# Patient Record
Sex: FEMALE | Race: WHITE | NOT HISPANIC OR LATINO
[De-identification: names, ages, dates, MRNs, and addresses within clinical notes are randomized per-mention and may not be internally consistent; named-entity substitution may affect disease eponyms.]

---

## 2017-02-24 ENCOUNTER — APPOINTMENT (OUTPATIENT)
Dept: ORTHOPEDIC SURGERY | Facility: CLINIC | Age: 62
End: 2017-02-24

## 2017-02-24 VITALS
DIASTOLIC BLOOD PRESSURE: 73 MMHG | HEIGHT: 64 IN | HEART RATE: 74 BPM | WEIGHT: 212 LBS | SYSTOLIC BLOOD PRESSURE: 141 MMHG | BODY MASS INDEX: 36.19 KG/M2

## 2017-02-24 DIAGNOSIS — M17.11 UNILATERAL PRIMARY OSTEOARTHRITIS, RIGHT KNEE: ICD-10-CM

## 2017-03-07 ENCOUNTER — OUTPATIENT (OUTPATIENT)
Dept: OUTPATIENT SERVICES | Facility: HOSPITAL | Age: 62
LOS: 1 days | End: 2017-03-07
Payer: MEDICAID

## 2017-03-07 VITALS
WEIGHT: 216.05 LBS | TEMPERATURE: 98 F | HEIGHT: 62.25 IN | SYSTOLIC BLOOD PRESSURE: 122 MMHG | DIASTOLIC BLOOD PRESSURE: 80 MMHG | HEART RATE: 68 BPM | RESPIRATION RATE: 16 BRPM

## 2017-03-07 DIAGNOSIS — M17.12 UNILATERAL PRIMARY OSTEOARTHRITIS, LEFT KNEE: ICD-10-CM

## 2017-03-07 DIAGNOSIS — Z98.890 OTHER SPECIFIED POSTPROCEDURAL STATES: Chronic | ICD-10-CM

## 2017-03-07 DIAGNOSIS — N60.02 SOLITARY CYST OF LEFT BREAST: Chronic | ICD-10-CM

## 2017-03-07 LAB
APPEARANCE UR: CLEAR — SIGNIFICANT CHANGE UP
APTT BLD: 34 SEC — SIGNIFICANT CHANGE UP (ref 27.5–37.4)
BILIRUB UR-MCNC: NEGATIVE — SIGNIFICANT CHANGE UP
BLD GP AB SCN SERPL QL: NEGATIVE — SIGNIFICANT CHANGE UP
BLOOD UR QL VISUAL: NEGATIVE — SIGNIFICANT CHANGE UP
BUN SERPL-MCNC: 20 MG/DL — SIGNIFICANT CHANGE UP (ref 7–23)
CALCIUM SERPL-MCNC: 9.7 MG/DL — SIGNIFICANT CHANGE UP (ref 8.4–10.5)
CHLORIDE SERPL-SCNC: 105 MMOL/L — SIGNIFICANT CHANGE UP (ref 98–107)
CO2 SERPL-SCNC: 26 MMOL/L — SIGNIFICANT CHANGE UP (ref 22–31)
COLOR SPEC: YELLOW — SIGNIFICANT CHANGE UP
CREAT SERPL-MCNC: 0.69 MG/DL — SIGNIFICANT CHANGE UP (ref 0.5–1.3)
GLUCOSE SERPL-MCNC: 98 MG/DL — SIGNIFICANT CHANGE UP (ref 70–99)
GLUCOSE UR-MCNC: NEGATIVE — SIGNIFICANT CHANGE UP
HCT VFR BLD CALC: 42 % — SIGNIFICANT CHANGE UP (ref 34.5–45)
HGB BLD-MCNC: 13.6 G/DL — SIGNIFICANT CHANGE UP (ref 11.5–15.5)
INR BLD: 1 — SIGNIFICANT CHANGE UP (ref 0.87–1.18)
KETONES UR-MCNC: NEGATIVE — SIGNIFICANT CHANGE UP
LEUKOCYTE ESTERASE UR-ACNC: NEGATIVE — SIGNIFICANT CHANGE UP
MCHC RBC-ENTMCNC: 28 PG — SIGNIFICANT CHANGE UP (ref 27–34)
MCHC RBC-ENTMCNC: 32.4 % — SIGNIFICANT CHANGE UP (ref 32–36)
MCV RBC AUTO: 86.6 FL — SIGNIFICANT CHANGE UP (ref 80–100)
MUCOUS THREADS # UR AUTO: SIGNIFICANT CHANGE UP
NITRITE UR-MCNC: NEGATIVE — SIGNIFICANT CHANGE UP
PH UR: 6 — SIGNIFICANT CHANGE UP (ref 4.6–8)
PLATELET # BLD AUTO: 321 K/UL — SIGNIFICANT CHANGE UP (ref 150–400)
PMV BLD: 9.6 FL — SIGNIFICANT CHANGE UP (ref 7–13)
POTASSIUM SERPL-MCNC: 4.8 MMOL/L — SIGNIFICANT CHANGE UP (ref 3.5–5.3)
POTASSIUM SERPL-SCNC: 4.8 MMOL/L — SIGNIFICANT CHANGE UP (ref 3.5–5.3)
PROT UR-MCNC: NEGATIVE — SIGNIFICANT CHANGE UP
PROTHROM AB SERPL-ACNC: 11.4 SEC — SIGNIFICANT CHANGE UP (ref 10–13.1)
RBC # BLD: 4.85 M/UL — SIGNIFICANT CHANGE UP (ref 3.8–5.2)
RBC # FLD: 12.8 % — SIGNIFICANT CHANGE UP (ref 10.3–14.5)
RBC CASTS # UR COMP ASSIST: SIGNIFICANT CHANGE UP (ref 0–?)
RH IG SCN BLD-IMP: POSITIVE — SIGNIFICANT CHANGE UP
SODIUM SERPL-SCNC: 145 MMOL/L — SIGNIFICANT CHANGE UP (ref 135–145)
SP GR SPEC: 1.02 — SIGNIFICANT CHANGE UP (ref 1–1.03)
SQUAMOUS # UR AUTO: SIGNIFICANT CHANGE UP
UROBILINOGEN FLD QL: NORMAL E.U. — SIGNIFICANT CHANGE UP (ref 0.1–0.2)
WBC # BLD: 5.24 K/UL — SIGNIFICANT CHANGE UP (ref 3.8–10.5)
WBC # FLD AUTO: 5.24 K/UL — SIGNIFICANT CHANGE UP (ref 3.8–10.5)
WBC UR QL: SIGNIFICANT CHANGE UP (ref 0–?)

## 2017-03-07 PROCEDURE — 93010 ELECTROCARDIOGRAM REPORT: CPT

## 2017-03-07 RX ORDER — PANTOPRAZOLE SODIUM 20 MG/1
40 TABLET, DELAYED RELEASE ORAL ONCE
Qty: 0 | Refills: 0 | Status: COMPLETED | OUTPATIENT
Start: 2017-03-21 | End: 2017-03-21

## 2017-03-07 RX ORDER — SODIUM CHLORIDE 9 MG/ML
1000 INJECTION, SOLUTION INTRAVENOUS
Qty: 0 | Refills: 0 | Status: DISCONTINUED | OUTPATIENT
Start: 2017-03-21 | End: 2017-03-21

## 2017-03-07 RX ORDER — ACETAMINOPHEN 500 MG
975 TABLET ORAL ONCE
Qty: 0 | Refills: 0 | Status: COMPLETED | OUTPATIENT
Start: 2017-03-21 | End: 2017-03-21

## 2017-03-07 RX ORDER — SODIUM CHLORIDE 9 MG/ML
3 INJECTION INTRAMUSCULAR; INTRAVENOUS; SUBCUTANEOUS EVERY 8 HOURS
Qty: 0 | Refills: 0 | Status: DISCONTINUED | OUTPATIENT
Start: 2017-03-21 | End: 2017-03-23

## 2017-03-07 RX ORDER — GABAPENTIN 400 MG/1
300 CAPSULE ORAL ONCE
Qty: 0 | Refills: 0 | Status: COMPLETED | OUTPATIENT
Start: 2017-03-21 | End: 2017-03-21

## 2017-03-07 RX ORDER — TRAMADOL HYDROCHLORIDE 50 MG/1
50 TABLET ORAL ONCE
Qty: 0 | Refills: 0 | Status: DISCONTINUED | OUTPATIENT
Start: 2017-03-21 | End: 2017-03-21

## 2017-03-07 RX ORDER — MESALAMINE 400 MG
1 TABLET, DELAYED RELEASE (ENTERIC COATED) ORAL
Qty: 0 | Refills: 0 | COMMUNITY

## 2017-03-07 NOTE — H&P PST ADULT - NEGATIVE CARDIOVASCULAR SYMPTOMS
no dyspnea on exertion/no orthopnea/no palpitations/no chest pain/no claudication/no paroxysmal nocturnal dyspnea

## 2017-03-07 NOTE — H&P PST ADULT - NSANTHOSAYNRD_GEN_A_CORE
No. RENNY screening performed.  STOP BANG Legend: 0-2 = LOW Risk; 3-4 = INTERMEDIATE Risk; 5-8 = HIGH Risk

## 2017-03-07 NOTE — H&P PST ADULT - PSH
Breast cyst, left  excision  History of arthroscopy of both knees  (Right 2013, left 04/2016)  History of ovarian cystectomy

## 2017-03-07 NOTE — H&P PST ADULT - NEGATIVE NEUROLOGICAL SYMPTOMS
no syncope/no tremors/no loss of consciousness/no hemiparesis/no loss of sensation/no difficulty walking/no paresthesias/no focal seizures/no vertigo/no headache/no facial palsy/no generalized seizures/no weakness no tremors/no focal seizures/no facial palsy/no weakness/no paresthesias/no loss of consciousness/no syncope/no hemiparesis/no vertigo/no generalized seizures/no loss of sensation/no headache

## 2017-03-07 NOTE — H&P PST ADULT - NEGATIVE OPHTHALMOLOGIC SYMPTOMS
no lacrimation L/no loss of vision L/no pain R/no discharge L/no pain L/no scleral injection R/no blurred vision R/no discharge R/no diplopia/no irritation R/no blurred vision L/no irritation L/no photophobia/no loss of vision R/no scleral injection L/no lacrimation R

## 2017-03-07 NOTE — H&P PST ADULT - NEGATIVE GENERAL GENITOURINARY SYMPTOMS
normal urinary frequency/no flank pain L/no flank pain R/no hematuria/no bladder infections/no dysuria/no renal colic/no urinary hesitancy/no gas in urine/no urine discoloration no bladder infections/normal urinary frequency/no renal colic/no urine discoloration/no gas in urine/no flank pain R/no nocturia/no flank pain L/no urinary hesitancy/no dysuria/no hematuria

## 2017-03-07 NOTE — H&P PST ADULT - PROBLEM SELECTOR PLAN 1
Scheduled for left knee partial / total replacement on 03/21/17. Pre op instructions, famotidine, chlorhexidine gluconate soap given and explained. Pt verbalized understanding.  Pending medical clearance Scheduled for right knee partial / total replacement on 03/21/17. Pre op instructions, famotidine, chlorhexidine gluconate soap given and explained. Pt verbalized understanding.  Pending medical clearance

## 2017-03-07 NOTE — H&P PST ADULT - NEGATIVE ENMT SYMPTOMS
no throat pain/no dry mouth/no dysphagia/no nasal discharge/no sinus symptoms/no vertigo/no nasal obstruction/no post-nasal discharge/no nasal congestion/no hearing difficulty/no ear pain/no tinnitus

## 2017-03-07 NOTE — H&P PST ADULT - NEGATIVE SKIN SYMPTOMS
no itching/no pitted nails/no tumor/no hair loss/no brittle nails/no rash/no change in size/color of mole no itching/no pitted nails/no hair loss/no brittle nails/no rash/no dryness/no change in size/color of mole/no tumor

## 2017-03-07 NOTE — H&P PST ADULT - MUSCULOSKELETAL
detailed exam decreased ROM due to pain/joint swelling/b/l knees/diminished strength details… no joint warmth/no calf tenderness/decreased ROM due to pain/joint swelling/no joint erythema/b/l knees/diminished strength no joint erythema/no joint warmth/no calf tenderness/b/l knees, right>left/decreased ROM due to pain/joint swelling/diminished strength

## 2017-03-07 NOTE — H&P PST ADULT - NEGATIVE FEMALE-SPECIFIC SYMPTOMS
no menorrhagia/no abnormal vaginal bleeding/no spotting/no irregular menses no abnormal vaginal bleeding/no menorrhagia/no spotting/no pelvic pain/no vaginal discharge/no irregular menses

## 2017-03-07 NOTE — H&P PST ADULT - ASSESSMENT
60 y/o female presents with hx of unilateral primary osteoarthritis, left knee 60 y/o female presents with hx of unilateral primary osteoarthritis, right knee

## 2017-03-07 NOTE — H&P PST ADULT - NEUROLOGICAL DETAILS
sensation intact/deep reflexes intact/no spontaneous movement/responds to pain/responds to verbal commands/cranial nerves intact/alert and oriented x 3/superficial reflexes intact

## 2017-03-07 NOTE — H&P PST ADULT - PSY GEN HX ROS MEA POS PC
was treated with medication for a while, pt stated that she stopped 2 months ago on her own but Md was made aware/depression

## 2017-03-07 NOTE — H&P PST ADULT - MS GEN HX ROS MEA POS PC
elbows, shoulders, knees/back pain/joint swelling/joint pain/stiffness/arthritis muscle cramps/stiffness/back pain/arthritis/joint pain/joint swelling/elbows, shoulders, knees joint swelling/arthritis/joint pain/elbows, shoulders, knees right > left/muscle cramps/stiffness/back pain

## 2017-03-07 NOTE — H&P PST ADULT - RS GEN PE MLT RESP DETAILS PC
breath sounds equal/respirations non-labored/airway patent/no chest wall tenderness/no rhonchi/no intercostal retractions/no rales/clear to auscultation bilaterally/good air movement no wheezes/breath sounds equal/no intercostal retractions/no rales/no chest wall tenderness/no rhonchi/respirations non-labored/clear to auscultation bilaterally/good air movement/airway patent

## 2017-03-07 NOTE — H&P PST ADULT - NEGATIVE BREAST SYMPTOMS
no breast tenderness L/no breast tenderness R/no breast lump R/no nipple discharge L/no breast lump L no breast tenderness L/no breast lump R/no nipple discharge L/no breast lump L/no nipple discharge R/no breast tenderness R

## 2017-03-07 NOTE — H&P PST ADULT - HISTORY OF PRESENT ILLNESS
62 y/o  female with PMH: HTN, UC, GERD presents to PST for pre op evaluation with long term hx of bilateral knee pain, S/P b/l knees arthroscopy (2013, 2016). Pt stated that pain has been progressively wore 04/2016. Had physical therapy and multiple cortisone injections with some improvement. Now scheduled for left knee partail / toal knee replacement on 03/21/17. 62 y/o  female with PMH: HTN, UC, GERD, obesity presents to PST for pre op evaluation with long term hx of bilateral knee pain, S/P b/l knees arthroscopy (2013, 2016). Pt stated that pain has been progressively wore 04/2016. Had physical therapy and multiple cortisone injections with some improvement. Now scheduled for left knee partial / total knee replacement on 03/21/17. 60 y/o  female with PMH: HTN, UC, GERD, obesity presents to PST for pre op evaluation with long term hx of bilateral knee pain, S/P b/l knees arthroscopy (2013, 2016). Had physical therapy and multiple cortisone injections with some improvement. Pt stated that pain has been progressively worsening since 04/2016. Now scheduled for left knee partial / total knee replacement on 03/21/17. 62 y/o  female with PMH: HTN, UC, GERD, obesity presents to PST for pre op evaluation with long term hx of bilateral knee pain, S/P b/l knees arthroscopy (2013, 2016). Had physical therapy and multiple cortisone injections with some improvement. Pt stated that pain has been progressively worsening since 04/2016. Now scheduled for right knee partial / total knee replacement on 03/21/17.

## 2017-03-07 NOTE — H&P PST ADULT - NEGATIVE GENERAL SYMPTOMS
no fever/no chills/no fatigue no weight loss/no fever/no weight gain/no sweating/no anorexia/no polyphagia/no chills/no polyuria no fever/no chills/no weight gain/no polyphagia/no polydipsia/no sweating/no anorexia/no weight loss/no polyuria/no malaise

## 2017-03-07 NOTE — H&P PST ADULT - PMH
HTN (hypertension) GERD (gastroesophageal reflux disease)    HTN (hypertension)    Lumbar herniated disc    Obesity (BMI 30-39.9)    Ulcerative colitis GERD (gastroesophageal reflux disease)    HTN (hypertension)    Lumbar herniated disc    Obesity (BMI 30-39.9)    Ulcerative colitis    Unilateral primary osteoarthritis, left knee GERD (gastroesophageal reflux disease)    HTN (hypertension)    Lumbar herniated disc    Obesity (BMI 30-39.9)    Ulcerative colitis    Unilateral primary osteoarthritis, left knee    Unilateral primary osteoarthritis, right knee

## 2017-03-08 LAB
BACTERIA UR CULT: SIGNIFICANT CHANGE UP
SPECIMEN SOURCE: SIGNIFICANT CHANGE UP
SPECIMEN SOURCE: SIGNIFICANT CHANGE UP

## 2017-03-09 LAB — BACTERIA NPH CULT: SIGNIFICANT CHANGE UP

## 2017-03-10 ENCOUNTER — OTHER (OUTPATIENT)
Age: 62
End: 2017-03-10

## 2017-03-20 ENCOUNTER — RESULT REVIEW (OUTPATIENT)
Age: 62
End: 2017-03-20

## 2017-03-20 NOTE — ASU PATIENT PROFILE, ADULT - PMH
GERD (gastroesophageal reflux disease)    HTN (hypertension)    Lumbar herniated disc    Obesity (BMI 30-39.9)    Ulcerative colitis    Unilateral primary osteoarthritis, left knee    Unilateral primary osteoarthritis, right knee

## 2017-03-21 ENCOUNTER — INPATIENT (INPATIENT)
Facility: HOSPITAL | Age: 62
LOS: 1 days | Discharge: HOME HEALTH | End: 2017-03-23
Attending: ORTHOPAEDIC SURGERY | Admitting: ORTHOPAEDIC SURGERY
Payer: MEDICAID

## 2017-03-21 ENCOUNTER — APPOINTMENT (OUTPATIENT)
Dept: ORTHOPEDIC SURGERY | Facility: HOSPITAL | Age: 62
End: 2017-03-21

## 2017-03-21 VITALS
RESPIRATION RATE: 15 BRPM | WEIGHT: 216.05 LBS | OXYGEN SATURATION: 98 % | HEIGHT: 62 IN | SYSTOLIC BLOOD PRESSURE: 137 MMHG | DIASTOLIC BLOOD PRESSURE: 64 MMHG | TEMPERATURE: 98 F | HEART RATE: 78 BPM

## 2017-03-21 DIAGNOSIS — Z98.890 OTHER SPECIFIED POSTPROCEDURAL STATES: Chronic | ICD-10-CM

## 2017-03-21 DIAGNOSIS — M17.12 UNILATERAL PRIMARY OSTEOARTHRITIS, LEFT KNEE: ICD-10-CM

## 2017-03-21 DIAGNOSIS — N60.02 SOLITARY CYST OF LEFT BREAST: Chronic | ICD-10-CM

## 2017-03-21 LAB
BUN SERPL-MCNC: 15 MG/DL — SIGNIFICANT CHANGE UP (ref 7–23)
CALCIUM SERPL-MCNC: 8.8 MG/DL — SIGNIFICANT CHANGE UP (ref 8.4–10.5)
CHLORIDE SERPL-SCNC: 102 MMOL/L — SIGNIFICANT CHANGE UP (ref 98–107)
CO2 SERPL-SCNC: 25 MMOL/L — SIGNIFICANT CHANGE UP (ref 22–31)
CREAT SERPL-MCNC: 0.6 MG/DL — SIGNIFICANT CHANGE UP (ref 0.5–1.3)
GLUCOSE SERPL-MCNC: 134 MG/DL — HIGH (ref 70–99)
HCT VFR BLD CALC: 35.8 % — SIGNIFICANT CHANGE UP (ref 34.5–45)
HGB BLD-MCNC: 11.9 G/DL — SIGNIFICANT CHANGE UP (ref 11.5–15.5)
MCHC RBC-ENTMCNC: 28.4 PG — SIGNIFICANT CHANGE UP (ref 27–34)
MCHC RBC-ENTMCNC: 33.2 % — SIGNIFICANT CHANGE UP (ref 32–36)
MCV RBC AUTO: 85.4 FL — SIGNIFICANT CHANGE UP (ref 80–100)
PLATELET # BLD AUTO: 227 K/UL — SIGNIFICANT CHANGE UP (ref 150–400)
PMV BLD: 9.2 FL — SIGNIFICANT CHANGE UP (ref 7–13)
POTASSIUM SERPL-MCNC: 3.9 MMOL/L — SIGNIFICANT CHANGE UP (ref 3.5–5.3)
POTASSIUM SERPL-SCNC: 3.9 MMOL/L — SIGNIFICANT CHANGE UP (ref 3.5–5.3)
RBC # BLD: 4.19 M/UL — SIGNIFICANT CHANGE UP (ref 3.8–5.2)
RBC # FLD: 13 % — SIGNIFICANT CHANGE UP (ref 10.3–14.5)
RH IG SCN BLD-IMP: POSITIVE — SIGNIFICANT CHANGE UP
SODIUM SERPL-SCNC: 140 MMOL/L — SIGNIFICANT CHANGE UP (ref 135–145)
WBC # BLD: 5.44 K/UL — SIGNIFICANT CHANGE UP (ref 3.8–10.5)
WBC # FLD AUTO: 5.44 K/UL — SIGNIFICANT CHANGE UP (ref 3.8–10.5)

## 2017-03-21 PROCEDURE — 27447 TOTAL KNEE ARTHROPLASTY: CPT | Mod: RT

## 2017-03-21 PROCEDURE — 88311 DECALCIFY TISSUE: CPT | Mod: 26

## 2017-03-21 PROCEDURE — 73560 X-RAY EXAM OF KNEE 1 OR 2: CPT | Mod: 26,RT

## 2017-03-21 PROCEDURE — 88305 TISSUE EXAM BY PATHOLOGIST: CPT | Mod: 26

## 2017-03-21 RX ORDER — NALOXONE HYDROCHLORIDE 4 MG/.1ML
0.1 SPRAY NASAL
Qty: 0 | Refills: 0 | Status: DISCONTINUED | OUTPATIENT
Start: 2017-03-21 | End: 2017-03-23

## 2017-03-21 RX ORDER — OXYCODONE HYDROCHLORIDE 5 MG/1
10 TABLET ORAL EVERY 4 HOURS
Qty: 0 | Refills: 0 | Status: DISCONTINUED | OUTPATIENT
Start: 2017-03-21 | End: 2017-03-23

## 2017-03-21 RX ORDER — METOCLOPRAMIDE HCL 10 MG
10 TABLET ORAL ONCE
Qty: 0 | Refills: 0 | Status: COMPLETED | OUTPATIENT
Start: 2017-03-21 | End: 2017-03-21

## 2017-03-21 RX ORDER — MORPHINE SULFATE 50 MG/1
4 CAPSULE, EXTENDED RELEASE ORAL EVERY 4 HOURS
Qty: 0 | Refills: 0 | Status: DISCONTINUED | OUTPATIENT
Start: 2017-03-21 | End: 2017-03-23

## 2017-03-21 RX ORDER — SODIUM CHLORIDE 9 MG/ML
1000 INJECTION, SOLUTION INTRAVENOUS ONCE
Qty: 0 | Refills: 0 | Status: COMPLETED | OUTPATIENT
Start: 2017-03-21 | End: 2017-03-21

## 2017-03-21 RX ORDER — LOSARTAN POTASSIUM 100 MG/1
50 TABLET, FILM COATED ORAL DAILY
Qty: 0 | Refills: 0 | Status: DISCONTINUED | OUTPATIENT
Start: 2017-03-21 | End: 2017-03-23

## 2017-03-21 RX ORDER — SODIUM CHLORIDE 9 MG/ML
1000 INJECTION INTRAMUSCULAR; INTRAVENOUS; SUBCUTANEOUS ONCE
Qty: 0 | Refills: 0 | Status: COMPLETED | OUTPATIENT
Start: 2017-03-22 | End: 2017-03-22

## 2017-03-21 RX ORDER — MORPHINE SULFATE 50 MG/1
4 CAPSULE, EXTENDED RELEASE ORAL ONCE
Qty: 0 | Refills: 0 | Status: DISCONTINUED | OUTPATIENT
Start: 2017-03-21 | End: 2017-03-21

## 2017-03-21 RX ORDER — DEXAMETHASONE 0.5 MG/5ML
10 ELIXIR ORAL ONCE
Qty: 0 | Refills: 0 | Status: COMPLETED | OUTPATIENT
Start: 2017-03-22 | End: 2017-03-22

## 2017-03-21 RX ORDER — GABAPENTIN 400 MG/1
100 CAPSULE ORAL EVERY 8 HOURS
Qty: 0 | Refills: 0 | Status: DISCONTINUED | OUTPATIENT
Start: 2017-03-21 | End: 2017-03-23

## 2017-03-21 RX ORDER — ACETAMINOPHEN 500 MG
650 TABLET ORAL EVERY 6 HOURS
Qty: 0 | Refills: 0 | Status: DISCONTINUED | OUTPATIENT
Start: 2017-03-21 | End: 2017-03-23

## 2017-03-21 RX ORDER — KETOROLAC TROMETHAMINE 30 MG/ML
15 SYRINGE (ML) INJECTION EVERY 8 HOURS
Qty: 0 | Refills: 0 | Status: DISCONTINUED | OUTPATIENT
Start: 2017-03-21 | End: 2017-03-22

## 2017-03-21 RX ORDER — SODIUM CHLORIDE 9 MG/ML
1000 INJECTION INTRAMUSCULAR; INTRAVENOUS; SUBCUTANEOUS ONCE
Qty: 0 | Refills: 0 | Status: COMPLETED | OUTPATIENT
Start: 2017-03-21 | End: 2017-03-21

## 2017-03-21 RX ORDER — MORPHINE SULFATE 50 MG/1
0.1 CAPSULE, EXTENDED RELEASE ORAL ONCE
Qty: 0 | Refills: 0 | Status: DISCONTINUED | OUTPATIENT
Start: 2017-03-21 | End: 2017-03-21

## 2017-03-21 RX ORDER — OXYCODONE HYDROCHLORIDE 5 MG/1
5 TABLET ORAL EVERY 4 HOURS
Qty: 0 | Refills: 0 | Status: DISCONTINUED | OUTPATIENT
Start: 2017-03-21 | End: 2017-03-23

## 2017-03-21 RX ORDER — ONDANSETRON 8 MG/1
4 TABLET, FILM COATED ORAL EVERY 6 HOURS
Qty: 0 | Refills: 0 | Status: DISCONTINUED | OUTPATIENT
Start: 2017-03-21 | End: 2017-03-23

## 2017-03-21 RX ORDER — CEFAZOLIN SODIUM 1 G
2000 VIAL (EA) INJECTION EVERY 8 HOURS
Qty: 0 | Refills: 0 | Status: COMPLETED | OUTPATIENT
Start: 2017-03-21 | End: 2017-03-21

## 2017-03-21 RX ORDER — TRAMADOL HYDROCHLORIDE 50 MG/1
50 TABLET ORAL EVERY 8 HOURS
Qty: 0 | Refills: 0 | Status: DISCONTINUED | OUTPATIENT
Start: 2017-03-21 | End: 2017-03-23

## 2017-03-21 RX ORDER — POLYETHYLENE GLYCOL 3350 17 G/17G
17 POWDER, FOR SOLUTION ORAL DAILY
Qty: 0 | Refills: 0 | Status: DISCONTINUED | OUTPATIENT
Start: 2017-03-21 | End: 2017-03-23

## 2017-03-21 RX ORDER — SENNA PLUS 8.6 MG/1
2 TABLET ORAL AT BEDTIME
Qty: 0 | Refills: 0 | Status: DISCONTINUED | OUTPATIENT
Start: 2017-03-21 | End: 2017-03-23

## 2017-03-21 RX ORDER — DOCUSATE SODIUM 100 MG
100 CAPSULE ORAL THREE TIMES A DAY
Qty: 0 | Refills: 0 | Status: DISCONTINUED | OUTPATIENT
Start: 2017-03-21 | End: 2017-03-23

## 2017-03-21 RX ORDER — CELECOXIB 200 MG/1
200 CAPSULE ORAL
Qty: 0 | Refills: 0 | Status: DISCONTINUED | OUTPATIENT
Start: 2017-03-23 | End: 2017-03-23

## 2017-03-21 RX ORDER — ASPIRIN/CALCIUM CARB/MAGNESIUM 324 MG
325 TABLET ORAL
Qty: 0 | Refills: 0 | Status: DISCONTINUED | OUTPATIENT
Start: 2017-03-21 | End: 2017-03-23

## 2017-03-21 RX ORDER — SODIUM CHLORIDE 9 MG/ML
1000 INJECTION, SOLUTION INTRAVENOUS
Qty: 0 | Refills: 0 | Status: DISCONTINUED | OUTPATIENT
Start: 2017-03-21 | End: 2017-03-23

## 2017-03-21 RX ORDER — PANTOPRAZOLE SODIUM 20 MG/1
40 TABLET, DELAYED RELEASE ORAL DAILY
Qty: 0 | Refills: 0 | Status: DISCONTINUED | OUTPATIENT
Start: 2017-03-21 | End: 2017-03-23

## 2017-03-21 RX ADMIN — Medication 15 MILLIGRAM(S): at 23:04

## 2017-03-21 RX ADMIN — ONDANSETRON 4 MILLIGRAM(S): 8 TABLET, FILM COATED ORAL at 12:26

## 2017-03-21 RX ADMIN — GABAPENTIN 300 MILLIGRAM(S): 400 CAPSULE ORAL at 06:54

## 2017-03-21 RX ADMIN — SODIUM CHLORIDE 30 MILLILITER(S): 9 INJECTION, SOLUTION INTRAVENOUS at 06:57

## 2017-03-21 RX ADMIN — Medication 325 MILLIGRAM(S): at 23:04

## 2017-03-21 RX ADMIN — SODIUM CHLORIDE 150 MILLILITER(S): 9 INJECTION, SOLUTION INTRAVENOUS at 09:51

## 2017-03-21 RX ADMIN — Medication 10 MILLIGRAM(S): at 19:03

## 2017-03-21 RX ADMIN — SENNA PLUS 2 TABLET(S): 8.6 TABLET ORAL at 23:04

## 2017-03-21 RX ADMIN — Medication 100 MILLIGRAM(S): at 18:13

## 2017-03-21 RX ADMIN — SODIUM CHLORIDE 333.33 MILLILITER(S): 9 INJECTION, SOLUTION INTRAVENOUS at 23:30

## 2017-03-21 RX ADMIN — TRAMADOL HYDROCHLORIDE 50 MILLIGRAM(S): 50 TABLET ORAL at 23:04

## 2017-03-21 RX ADMIN — SODIUM CHLORIDE 150 MILLILITER(S): 9 INJECTION, SOLUTION INTRAVENOUS at 12:26

## 2017-03-21 RX ADMIN — TRAMADOL HYDROCHLORIDE 50 MILLIGRAM(S): 50 TABLET ORAL at 06:53

## 2017-03-21 RX ADMIN — Medication 975 MILLIGRAM(S): at 06:55

## 2017-03-21 RX ADMIN — PANTOPRAZOLE SODIUM 40 MILLIGRAM(S): 20 TABLET, DELAYED RELEASE ORAL at 06:54

## 2017-03-21 RX ADMIN — GABAPENTIN 100 MILLIGRAM(S): 400 CAPSULE ORAL at 23:04

## 2017-03-21 RX ADMIN — SODIUM CHLORIDE 1000 MILLILITER(S): 9 INJECTION INTRAMUSCULAR; INTRAVENOUS; SUBCUTANEOUS at 12:26

## 2017-03-21 RX ADMIN — Medication 15 MILLIGRAM(S): at 13:57

## 2017-03-21 RX ADMIN — Medication 100 MILLIGRAM(S): at 23:47

## 2017-03-21 RX ADMIN — Medication 650 MILLIGRAM(S): at 23:48

## 2017-03-21 RX ADMIN — ONDANSETRON 4 MILLIGRAM(S): 8 TABLET, FILM COATED ORAL at 18:32

## 2017-03-21 RX ADMIN — ONDANSETRON 4 MILLIGRAM(S): 8 TABLET, FILM COATED ORAL at 12:27

## 2017-03-21 NOTE — PHYSICAL THERAPY INITIAL EVALUATION ADULT - GENERAL OBSERVATIONS, REHAB EVAL
Pt encountered on stretcher, no distress, AxOx4, with +IV, +hemovac, Right Knee wrapped in ace bandage, and Right Knee immobilizer

## 2017-03-21 NOTE — PHYSICAL THERAPY INITIAL EVALUATION ADULT - ADDITIONAL COMMENTS
Pt lives in a private house with her 2 children with a flight of stairs to negotiate. Prior to hospital admission pt was completely independent and used a single axis cane with ambulation.

## 2017-03-21 NOTE — PHYSICAL THERAPY INITIAL EVALUATION ADULT - RANGE OF MOTION EXAMINATION, REHAB EVAL
Left LE ROM was WNL (within normal limits)/bilateral upper extremity ROM was WNL (within normal limits)

## 2017-03-21 NOTE — PHYSICAL THERAPY INITIAL EVALUATION ADULT - PERTINENT HX OF CURRENT PROBLEM, REHAB EVAL
62 y/o  female with PMH: HTN, UC, GERD, obesity presents to PST for pre op evaluation with long term hx of bilateral knee pain, S/P b/l knees arthroscopy (2013, 2016). Had physical therapy and multiple cortisone injections with some improvement. Pt stated that pain has been progressively worsening since 04/2016. Now scheduled for right knee partial / total knee replacement on 03/21/17

## 2017-03-21 NOTE — PHYSICAL THERAPY INITIAL EVALUATION ADULT - ACTIVE RANGE OF MOTION EXAMINATION, REHAB EVAL
LLE Active ROM was WNL (within normal limits)/Right Knee flexion 95 degrees/huber. upper extremity Active ROM was WNL (within normal limits)

## 2017-03-21 NOTE — PHYSICAL THERAPY INITIAL EVALUATION ADULT - CRITERIA FOR SKILLED THERAPEUTIC INTERVENTIONS
impairments found/risk reduction/prevention/rehab potential/functional limitations in following categories

## 2017-03-21 NOTE — PHYSICAL THERAPY INITIAL EVALUATION ADULT - PASSIVE RANGE OF MOTION EXAMINATION, REHAB EVAL
Right Knee flexion 108 degrees/bilateral upper extremity Passive ROM was WNL (within normal limits)/Left LE Passive ROM was WNL (within normal limits)

## 2017-03-22 ENCOUNTER — TRANSCRIPTION ENCOUNTER (OUTPATIENT)
Age: 62
End: 2017-03-22

## 2017-03-22 LAB
BUN SERPL-MCNC: 12 MG/DL — SIGNIFICANT CHANGE UP (ref 7–23)
CALCIUM SERPL-MCNC: 9.1 MG/DL — SIGNIFICANT CHANGE UP (ref 8.4–10.5)
CHLORIDE SERPL-SCNC: 102 MMOL/L — SIGNIFICANT CHANGE UP (ref 98–107)
CO2 SERPL-SCNC: 23 MMOL/L — SIGNIFICANT CHANGE UP (ref 22–31)
CREAT SERPL-MCNC: 0.5 MG/DL — SIGNIFICANT CHANGE UP (ref 0.5–1.3)
GLUCOSE SERPL-MCNC: 108 MG/DL — HIGH (ref 70–99)
HCT VFR BLD CALC: 35.7 % — SIGNIFICANT CHANGE UP (ref 34.5–45)
HGB BLD-MCNC: 11.8 G/DL — SIGNIFICANT CHANGE UP (ref 11.5–15.5)
MCHC RBC-ENTMCNC: 28.1 PG — SIGNIFICANT CHANGE UP (ref 27–34)
MCHC RBC-ENTMCNC: 33.1 % — SIGNIFICANT CHANGE UP (ref 32–36)
MCV RBC AUTO: 85 FL — SIGNIFICANT CHANGE UP (ref 80–100)
PLATELET # BLD AUTO: 255 K/UL — SIGNIFICANT CHANGE UP (ref 150–400)
PMV BLD: 9.2 FL — SIGNIFICANT CHANGE UP (ref 7–13)
POTASSIUM SERPL-MCNC: 4 MMOL/L — SIGNIFICANT CHANGE UP (ref 3.5–5.3)
POTASSIUM SERPL-SCNC: 4 MMOL/L — SIGNIFICANT CHANGE UP (ref 3.5–5.3)
RBC # BLD: 4.2 M/UL — SIGNIFICANT CHANGE UP (ref 3.8–5.2)
RBC # FLD: 13 % — SIGNIFICANT CHANGE UP (ref 10.3–14.5)
SODIUM SERPL-SCNC: 140 MMOL/L — SIGNIFICANT CHANGE UP (ref 135–145)
WBC # BLD: 8.85 K/UL — SIGNIFICANT CHANGE UP (ref 3.8–10.5)
WBC # FLD AUTO: 8.85 K/UL — SIGNIFICANT CHANGE UP (ref 3.8–10.5)

## 2017-03-22 RX ORDER — MESALAMINE 400 MG
1500 TABLET, DELAYED RELEASE (ENTERIC COATED) ORAL DAILY
Qty: 0 | Refills: 0 | Status: DISCONTINUED | OUTPATIENT
Start: 2017-03-22 | End: 2017-03-22

## 2017-03-22 RX ORDER — MESALAMINE 400 MG
3 TABLET, DELAYED RELEASE (ENTERIC COATED) ORAL
Qty: 0 | Refills: 0 | COMMUNITY

## 2017-03-22 RX ORDER — GABAPENTIN 400 MG/1
1 CAPSULE ORAL
Qty: 90 | Refills: 0
Start: 2017-03-22 | End: 2017-04-21

## 2017-03-22 RX ORDER — ASPIRIN/CALCIUM CARB/MAGNESIUM 324 MG
1 TABLET ORAL
Qty: 90 | Refills: 0
Start: 2017-03-22 | End: 2017-05-06

## 2017-03-22 RX ORDER — ACETAMINOPHEN 500 MG
2 TABLET ORAL
Qty: 0 | Refills: 0 | COMMUNITY

## 2017-03-22 RX ORDER — SENNA PLUS 8.6 MG/1
2 TABLET ORAL
Qty: 10 | Refills: 0
Start: 2017-03-22 | End: 2017-03-27

## 2017-03-22 RX ORDER — MESALAMINE 400 MG
500 TABLET, DELAYED RELEASE (ENTERIC COATED) ORAL THREE TIMES A DAY
Qty: 0 | Refills: 0 | Status: DISCONTINUED | OUTPATIENT
Start: 2017-03-22 | End: 2017-03-22

## 2017-03-22 RX ORDER — OXYCODONE AND ACETAMINOPHEN 5; 325 MG/1; MG/1
1 TABLET ORAL
Qty: 60 | Refills: 0
Start: 2017-03-22 | End: 2017-03-29

## 2017-03-22 RX ORDER — TRAMADOL HYDROCHLORIDE 50 MG/1
1 TABLET ORAL
Qty: 42 | Refills: 0
Start: 2017-03-22 | End: 2017-04-05

## 2017-03-22 RX ORDER — DOCUSATE SODIUM 100 MG
1 CAPSULE ORAL
Qty: 0 | Refills: 0 | DISCHARGE
Start: 2017-03-22

## 2017-03-22 RX ADMIN — TRAMADOL HYDROCHLORIDE 50 MILLIGRAM(S): 50 TABLET ORAL at 06:03

## 2017-03-22 RX ADMIN — SODIUM CHLORIDE 1000 MILLILITER(S): 9 INJECTION INTRAMUSCULAR; INTRAVENOUS; SUBCUTANEOUS at 06:45

## 2017-03-22 RX ADMIN — Medication 100 MILLIGRAM(S): at 06:03

## 2017-03-22 RX ADMIN — SODIUM CHLORIDE 3 MILLILITER(S): 9 INJECTION INTRAMUSCULAR; INTRAVENOUS; SUBCUTANEOUS at 13:37

## 2017-03-22 RX ADMIN — Medication 650 MILLIGRAM(S): at 17:45

## 2017-03-22 RX ADMIN — Medication 650 MILLIGRAM(S): at 12:14

## 2017-03-22 RX ADMIN — Medication 650 MILLIGRAM(S): at 06:03

## 2017-03-22 RX ADMIN — GABAPENTIN 100 MILLIGRAM(S): 400 CAPSULE ORAL at 14:15

## 2017-03-22 RX ADMIN — OXYCODONE HYDROCHLORIDE 5 MILLIGRAM(S): 5 TABLET ORAL at 21:30

## 2017-03-22 RX ADMIN — OXYCODONE HYDROCHLORIDE 5 MILLIGRAM(S): 5 TABLET ORAL at 22:30

## 2017-03-22 RX ADMIN — Medication 100 MILLIGRAM(S): at 21:29

## 2017-03-22 RX ADMIN — Medication 102 MILLIGRAM(S): at 07:15

## 2017-03-22 RX ADMIN — LOSARTAN POTASSIUM 50 MILLIGRAM(S): 100 TABLET, FILM COATED ORAL at 06:03

## 2017-03-22 RX ADMIN — Medication 15 MILLIGRAM(S): at 06:03

## 2017-03-22 RX ADMIN — GABAPENTIN 100 MILLIGRAM(S): 400 CAPSULE ORAL at 06:03

## 2017-03-22 RX ADMIN — SODIUM CHLORIDE 3 MILLILITER(S): 9 INJECTION INTRAMUSCULAR; INTRAVENOUS; SUBCUTANEOUS at 21:22

## 2017-03-22 RX ADMIN — Medication 325 MILLIGRAM(S): at 06:03

## 2017-03-22 RX ADMIN — Medication 325 MILLIGRAM(S): at 17:45

## 2017-03-22 RX ADMIN — SENNA PLUS 2 TABLET(S): 8.6 TABLET ORAL at 21:29

## 2017-03-22 RX ADMIN — GABAPENTIN 100 MILLIGRAM(S): 400 CAPSULE ORAL at 21:29

## 2017-03-22 RX ADMIN — TRAMADOL HYDROCHLORIDE 50 MILLIGRAM(S): 50 TABLET ORAL at 14:15

## 2017-03-22 RX ADMIN — PANTOPRAZOLE SODIUM 40 MILLIGRAM(S): 20 TABLET, DELAYED RELEASE ORAL at 12:14

## 2017-03-22 NOTE — DISCHARGE NOTE ADULT - HOME CARE AGENCY
[facility] - [phone]  Nurse to visit the day after hospital discharge; physical therapist to follow. Please contact the home care agency at the above phone number if you have not heard from them by 12 noon on the day after your hospital discharge.

## 2017-03-22 NOTE — OCCUPATIONAL THERAPY INITIAL EVALUATION ADULT - PERTINENT HX OF CURRENT PROBLEM, REHAB EVAL
Pt is a 60 yo female with PMHx of HTN, UC, GERD, & obesity with long term hx of bilateral knee pain, S/P b/l knees arthroscopy (2013, 2016). Pt had physical therapy and multiple cortisone injections with some improvement. Pt stated that pain has been progressively worsening since 04/2016. Pt is now s/p right knee total knee replacement on 03/21/17.

## 2017-03-22 NOTE — DISCHARGE NOTE ADULT - HOSPITAL COURSE
Patient is a 60 y/o female s/p right knee uni-arthroplasty on 3/22/17. Patient tolerated the procedure well without any intraoperative complications. Patient states pain is controlled. Tolerated Physical Therapy well. As per Dr Norman patient is stable and ready for discharge. Please follow up with Dr Norman in 1 week. Call office to make an appointment. Please follow up with your PMD for continuity of care and management. Please keep aquacel dressing in place until post op day # 14. Any sutures/staples to be removed on post op day # 14.  Please take aspirin twice daily for DVT PPX. Weight bearing as tolerated. Call orthopaedics with any questions. Patient is a 60 y/o female s/p right knee arthroplasty on 3/22/17. Patient tolerated the procedure well without any intraoperative complications. Patient states pain is controlled. Tolerated Physical Therapy well. As per Dr Norman patient is stable and ready for discharge. Please follow up with Dr Norman in 1 week. Call office to make an appointment. Please follow up with your PMD for continuity of care and management. Please keep aquacel dressing in place until post op day # 14. Any sutures/staples to be removed on post op day # 14.  Please take aspirin twice daily for DVT PPX. Weight bearing as tolerated. Call orthopaedics with any questions.

## 2017-03-22 NOTE — OCCUPATIONAL THERAPY INITIAL EVALUATION ADULT - MD ORDER
Occupational Therapy to evaluate and treat. Occupational Therapy to evaluate and treat. OOB to Chair. Ambulate with walker. Ambulate as Tolerated. Per HYACINTH Sierra, pt. is okay to participate in OT evaluation and perform OOB activity.

## 2017-03-22 NOTE — DISCHARGE NOTE ADULT - INSTRUCTIONS
may resume diet as prior to surgery You have a postop appointment with Dr Norman on 4/7/2017 @ 8:30 AM in the [place] office, please keep aquacel dressing in place until this appointment.  Notify Dr Norman if you experience any increase in pain not relieved with pain medication, any redness, drainage or swelling around incision or if you develop a fever>101.0.  Continue to do exercises as instructed, drink plenty of fluids.  Use over the counter stool softeners to assist with constipation related to pain medication.

## 2017-03-22 NOTE — DISCHARGE NOTE ADULT - MEDICATION SUMMARY - MEDICATIONS TO STOP TAKING
I will STOP taking the medications listed below when I get home from the hospital:    Tylenol 500 mg oral tablet  -- 2 tab(s) by mouth every 6 hours, As Needed

## 2017-03-22 NOTE — DISCHARGE NOTE ADULT - CARE PROVIDERS DIRECT ADDRESSES
[email or web address]

## 2017-03-22 NOTE — OCCUPATIONAL THERAPY INITIAL EVALUATION ADULT - PRECAUTIONS/LIMITATIONS, REHAB EVAL
fall precautions/surgical precautions/R Knee Immobilizer- Only when ambulating, may d/c when patient able to actively straight leg raise

## 2017-03-22 NOTE — DISCHARGE NOTE ADULT - CARE PROVIDER_API CALL
Kehinde Norman), Orthopaedic Surgery  [address] [place] [address]  Phone: [phone]  Fax: [phone]

## 2017-03-22 NOTE — OCCUPATIONAL THERAPY INITIAL EVALUATION ADULT - LEVEL OF INDEPENDENCE: SIT/SUPINE, REHAB EVAL
NT; Pt. left sitting at EOB at end of OT evaluation with PCA Becca present bedside. NAD. Call bell in reach. All lines intact & all precautions maintained. HYACINTH Sierra made aware and acknowledged.

## 2017-03-22 NOTE — DISCHARGE NOTE ADULT - DURABLE MEDICAL EQUIPMENT AGENCY
[facility] [employer] - [phone]  the above company delivered a rolling walker and commode to patient in hospital on 3/22/17.

## 2017-03-22 NOTE — DISCHARGE NOTE ADULT - PATIENT PORTAL LINK FT
“You can access the Follow[facility] Patient Portal, offered by [facility], by registering with the following website: [email or web address]”

## 2017-03-22 NOTE — OCCUPATIONAL THERAPY INITIAL EVALUATION ADULT - LIVES WITH, PROFILE
Pt. reports she lives with her two children in a house with 2 steps to enter. Once inside, pt. reports she has full flight of steps to negotiate to reach the 2nd floor where main bedroom and bathroom are located. Per pt., she has a shower stall in her bathroom.

## 2017-03-22 NOTE — DISCHARGE NOTE ADULT - CARE PLAN
Principal Discharge DX:	Unilateral primary osteoarthritis, left knee  Goal:	Pain reduction, improve ambulation and ADLs  Instructions for follow-up, activity and diet:	Please follow up with Dr Norman in 1 week. Call office to make an appointment. Please follow up with your PMD for continuity of care and management. Please keep aquacel dressing in place until post op day # 14. Any sutures/staples to be removed on post op day # 14.  Please take aspirin twice daily for DVT PPX. Weight bearing as tolerated. Call orthopaedics with any questions.

## 2017-03-22 NOTE — DISCHARGE NOTE ADULT - NS AS DC FOLLOWUP STROKE INST
Smoking Cessation Smoking Cessation/carenotes on surgical procedure, exercise worksheet, d/c medications and aquacel dressing

## 2017-03-22 NOTE — DISCHARGE NOTE ADULT - MEDICATION SUMMARY - MEDICATIONS TO TAKE
I will START or STAY ON the medications listed below when I get home from the hospital:    losartan 50 mg oral tablet  -- 1 tab(s) by mouth once a day in am  -- Indication: For HTN (hypertension)    docusate sodium 100 mg oral capsule  -- 1 cap(s) by mouth 3 times a day  -- Indication: For Stool softener     omeprazole 40 mg oral delayed release capsule  -- 1 cap(s) by mouth once a day in am  -- Indication: For GERD (gastroesophageal reflux disease)    folic acid 1 mg oral tablet  -- 1 tab(s) by mouth once a day in am  -- Indication: For supplement I will START or STAY ON the medications listed below when I get home from the hospital:    traMADol 50 mg oral tablet  -- 1 tab(s) by mouth every 8 hours MDD:3  -- Indication: For Pain med     aspirin 325 mg oral delayed release tablet  -- 1 tab(s) by mouth 2 times a day MDD:2  -- Indication: For DVT prophylaxis     Percocet 5/325 oral tablet  -- 1-2 tab(s) by mouth every 6 hours PRN Pain MDD:8  -- Indication: For Pain med     losartan 50 mg oral tablet  -- 1 tab(s) by mouth once a day in am  -- Indication: For HTN (hypertension)    gabapentin 100 mg oral capsule  -- 1 cap(s) by mouth every 8 hours MDD:3  -- Indication: For Pain med     docusate sodium 100 mg oral capsule  -- 1 cap(s) by mouth 3 times a day  -- Indication: For Stool softener     senna oral tablet  -- 2 tab(s) by mouth once a day (at bedtime) MDD:2  -- Indication: For stool softener     omeprazole 40 mg oral delayed release capsule  -- 1 cap(s) by mouth once a day in am  -- Indication: For GERD (gastroesophageal reflux disease)    folic acid 1 mg oral tablet  -- 1 tab(s) by mouth once a day in am  -- Indication: For supplement

## 2017-03-22 NOTE — DISCHARGE NOTE ADULT - PLAN OF CARE
Pain reduction, improve ambulation and ADLs Please follow up with Dr Norman in 1 week. Call office to make an appointment. Please follow up with your PMD for continuity of care and management. Please keep aquacel dressing in place until post op day # 14. Any sutures/staples to be removed on post op day # 14.  Please take aspirin twice daily for DVT PPX. Weight bearing as tolerated. Call orthopaedics with any questions.

## 2017-03-23 ENCOUNTER — TRANSCRIPTION ENCOUNTER (OUTPATIENT)
Age: 62
End: 2017-03-23

## 2017-03-23 VITALS
SYSTOLIC BLOOD PRESSURE: 102 MMHG | TEMPERATURE: 98 F | RESPIRATION RATE: 18 BRPM | OXYGEN SATURATION: 100 % | DIASTOLIC BLOOD PRESSURE: 53 MMHG | HEART RATE: 72 BPM

## 2017-03-23 PROCEDURE — 99238 HOSP IP/OBS DSCHRG MGMT 30/<: CPT

## 2017-03-23 RX ADMIN — PANTOPRAZOLE SODIUM 40 MILLIGRAM(S): 20 TABLET, DELAYED RELEASE ORAL at 12:31

## 2017-03-23 RX ADMIN — OXYCODONE HYDROCHLORIDE 5 MILLIGRAM(S): 5 TABLET ORAL at 05:33

## 2017-03-23 RX ADMIN — OXYCODONE HYDROCHLORIDE 5 MILLIGRAM(S): 5 TABLET ORAL at 12:32

## 2017-03-23 RX ADMIN — Medication 650 MILLIGRAM(S): at 12:31

## 2017-03-23 RX ADMIN — LOSARTAN POTASSIUM 50 MILLIGRAM(S): 100 TABLET, FILM COATED ORAL at 05:33

## 2017-03-23 RX ADMIN — Medication 650 MILLIGRAM(S): at 00:22

## 2017-03-23 RX ADMIN — OXYCODONE HYDROCHLORIDE 5 MILLIGRAM(S): 5 TABLET ORAL at 15:52

## 2017-03-23 RX ADMIN — Medication 650 MILLIGRAM(S): at 05:33

## 2017-03-23 RX ADMIN — Medication 100 MILLIGRAM(S): at 05:32

## 2017-03-23 RX ADMIN — OXYCODONE HYDROCHLORIDE 5 MILLIGRAM(S): 5 TABLET ORAL at 06:30

## 2017-03-23 RX ADMIN — Medication 325 MILLIGRAM(S): at 05:32

## 2017-03-23 RX ADMIN — SODIUM CHLORIDE 3 MILLILITER(S): 9 INJECTION INTRAMUSCULAR; INTRAVENOUS; SUBCUTANEOUS at 05:27

## 2017-03-23 RX ADMIN — GABAPENTIN 100 MILLIGRAM(S): 400 CAPSULE ORAL at 05:32

## 2017-04-06 ENCOUNTER — APPOINTMENT (OUTPATIENT)
Dept: ORTHOPEDIC SURGERY | Facility: CLINIC | Age: 62
End: 2017-04-06

## 2017-04-06 VITALS
BODY MASS INDEX: 36.19 KG/M2 | DIASTOLIC BLOOD PRESSURE: 84 MMHG | HEIGHT: 64 IN | SYSTOLIC BLOOD PRESSURE: 130 MMHG | WEIGHT: 212 LBS

## 2017-05-19 ENCOUNTER — APPOINTMENT (OUTPATIENT)
Dept: ORTHOPEDIC SURGERY | Facility: CLINIC | Age: 62
End: 2017-05-19

## 2017-05-19 VITALS
WEIGHT: 212 LBS | HEART RATE: 93 BPM | DIASTOLIC BLOOD PRESSURE: 71 MMHG | BODY MASS INDEX: 36.19 KG/M2 | HEIGHT: 64 IN | SYSTOLIC BLOOD PRESSURE: 113 MMHG

## 2017-05-19 DIAGNOSIS — Z96.651 PRESENCE OF RIGHT ARTIFICIAL KNEE JOINT: ICD-10-CM

## 2017-05-24 ENCOUNTER — APPOINTMENT (OUTPATIENT)
Dept: ORTHOPEDIC SURGERY | Facility: CLINIC | Age: 62
End: 2017-05-24

## 2018-05-16 ENCOUNTER — APPOINTMENT (OUTPATIENT)
Dept: RHEUMATOLOGY | Facility: CLINIC | Age: 63
End: 2018-05-16
Payer: MEDICAID

## 2018-05-16 ENCOUNTER — LABORATORY RESULT (OUTPATIENT)
Age: 63
End: 2018-05-16

## 2018-05-16 VITALS
HEART RATE: 76 BPM | OXYGEN SATURATION: 98 % | RESPIRATION RATE: 15 BRPM | BODY MASS INDEX: 38.62 KG/M2 | WEIGHT: 218 LBS | HEIGHT: 63 IN | SYSTOLIC BLOOD PRESSURE: 142 MMHG | TEMPERATURE: 98.6 F | DIASTOLIC BLOOD PRESSURE: 82 MMHG

## 2018-05-16 DIAGNOSIS — Z80.9 FAMILY HISTORY OF MALIGNANT NEOPLASM, UNSPECIFIED: ICD-10-CM

## 2018-05-16 DIAGNOSIS — Z78.0 ASYMPTOMATIC MENOPAUSAL STATE: ICD-10-CM

## 2018-05-16 DIAGNOSIS — Z86.79 PERSONAL HISTORY OF OTHER DISEASES OF THE CIRCULATORY SYSTEM: ICD-10-CM

## 2018-05-16 DIAGNOSIS — Z63.4 DISAPPEARANCE AND DEATH OF FAMILY MEMBER: ICD-10-CM

## 2018-05-16 DIAGNOSIS — Z87.19 PERSONAL HISTORY OF OTHER DISEASES OF THE DIGESTIVE SYSTEM: ICD-10-CM

## 2018-05-16 DIAGNOSIS — M19.90 UNSPECIFIED OSTEOARTHRITIS, UNSPECIFIED SITE: ICD-10-CM

## 2018-05-16 PROCEDURE — 99245 OFF/OP CONSLTJ NEW/EST HI 55: CPT | Mod: 25

## 2018-05-16 PROCEDURE — 85651 RBC SED RATE NONAUTOMATED: CPT

## 2018-05-16 PROCEDURE — 36415 COLL VENOUS BLD VENIPUNCTURE: CPT

## 2018-05-16 SDOH — SOCIAL STABILITY - SOCIAL INSECURITY: DISSAPEARANCE AND DEATH OF FAMILY MEMBER: Z63.4

## 2018-05-17 LAB
ALBUMIN SERPL ELPH-MCNC: 4.7 G/DL
ALP BLD-CCNC: 75 U/L
ALT SERPL-CCNC: 18 U/L
ANION GAP SERPL CALC-SCNC: 14 MMOL/L
APPEARANCE: CLEAR
AST SERPL-CCNC: 16 U/L
B BURGDOR IGG+IGM SER QL IB: NORMAL
BACTERIA: NEGATIVE
BASOPHILS # BLD AUTO: 0.05 K/UL
BASOPHILS NFR BLD AUTO: 0.8 %
BILIRUB SERPL-MCNC: 0.3 MG/DL
BILIRUBIN URINE: NEGATIVE
BLOOD URINE: NEGATIVE
BUN SERPL-MCNC: 20 MG/DL
CALCIUM SERPL-MCNC: 10.3 MG/DL
CHLORIDE SERPL-SCNC: 104 MMOL/L
CK SERPL-CCNC: 91 U/L
CO2 SERPL-SCNC: 25 MMOL/L
COLOR: YELLOW
CREAT SERPL-MCNC: 0.68 MG/DL
ENA SS-A AB SER IA-ACNC: <0.2 AL
ENA SS-B AB SER IA-ACNC: <0.2 AL
EOSINOPHIL # BLD AUTO: 0.1 K/UL
EOSINOPHIL NFR BLD AUTO: 1.6 %
GLUCOSE QUALITATIVE U: NEGATIVE MG/DL
GLUCOSE SERPL-MCNC: 101 MG/DL
HAV IGM SER QL: NONREACTIVE
HBV CORE IGM SER QL: NONREACTIVE
HBV SURFACE AG SER QL: NONREACTIVE
HCT VFR BLD CALC: 42.4 %
HCV AB SER QL: NONREACTIVE
HCV S/CO RATIO: 0.1 S/CO
HGB BLD-MCNC: 13.9 G/DL
HYALINE CASTS: 2 /LPF
IMM GRANULOCYTES NFR BLD AUTO: 0.3 %
KETONES URINE: NEGATIVE
LDH SERPL-CCNC: 204 U/L
LEUKOCYTE ESTERASE URINE: NEGATIVE
LYMPHOCYTES # BLD AUTO: 1.59 K/UL
LYMPHOCYTES NFR BLD AUTO: 25 %
MAGNESIUM SERPL-MCNC: 2.3 MG/DL
MAN DIFF?: NORMAL
MCHC RBC-ENTMCNC: 28 PG
MCHC RBC-ENTMCNC: 32.8 GM/DL
MCV RBC AUTO: 85.3 FL
MICROSCOPIC-UA: NORMAL
MONOCYTES # BLD AUTO: 0.59 K/UL
MONOCYTES NFR BLD AUTO: 9.3 %
NEUTROPHILS # BLD AUTO: 4.02 K/UL
NEUTROPHILS NFR BLD AUTO: 63 %
NITRITE URINE: NEGATIVE
PH URINE: 7.5
PHOSPHATE SERPL-MCNC: 3.9 MG/DL
PLATELET # BLD AUTO: 301 K/UL
POTASSIUM SERPL-SCNC: 4.3 MMOL/L
PROT SERPL-MCNC: 7.2 G/DL
PROTEIN URINE: NEGATIVE MG/DL
RBC # BLD: 4.97 M/UL
RBC # FLD: 14.1 %
RED BLOOD CELLS URINE: 0 /HPF
SODIUM SERPL-SCNC: 143 MMOL/L
SPECIFIC GRAVITY URINE: 1.02
SQUAMOUS EPITHELIAL CELLS: 4 /HPF
TSH SERPL-ACNC: 1.82 UIU/ML
URATE SERPL-MCNC: 3.2 MG/DL
UROBILINOGEN URINE: 1 MG/DL
WBC # FLD AUTO: 6.37 K/UL
WESR: 11
WHITE BLOOD CELLS URINE: 4 /HPF

## 2018-05-20 PROBLEM — Z78.0 HISTORY OF MENOPAUSE: Status: RESOLVED | Noted: 2018-05-16 | Resolved: 2018-05-20

## 2018-05-20 PROBLEM — Z86.79 HISTORY OF HYPERTENSION: Status: RESOLVED | Noted: 2018-05-16 | Resolved: 2018-05-20

## 2018-05-20 PROBLEM — Z63.4 WIDOW: Status: ACTIVE | Noted: 2018-05-20

## 2018-05-20 PROBLEM — Z87.19 HISTORY OF ULCERATIVE COLITIS: Status: RESOLVED | Noted: 2018-05-16 | Resolved: 2018-05-20

## 2018-05-20 PROBLEM — Z80.9 FAMILY HISTORY OF MALIGNANT NEOPLASM: Status: ACTIVE | Noted: 2018-05-16

## 2018-05-20 LAB
ACE BLD-CCNC: 33 U/L
ANA SER IF-ACNC: NEGATIVE
DEPRECATED KAPPA LC FREE/LAMBDA SER: 0.69 RATIO
DSDNA AB SER-ACNC: <12 IU/ML
ENDOMYSIUM IGA SER QL: NEGATIVE
ENDOMYSIUM IGA TITR SER: NORMAL
GLIADIN IGA SER QL: <5 UNITS
GLIADIN IGG SER QL: <5 UNITS
GLIADIN PEPTIDE IGA SER-ACNC: NEGATIVE
GLIADIN PEPTIDE IGG SER-ACNC: NEGATIVE
IGA SER QL IEP: 184 MG/DL
IGG SER QL IEP: 798 MG/DL
IGM SER QL IEP: 46 MG/DL
KAPPA LC CSF-MCNC: 0.9 MG/DL
KAPPA LC SERPL-MCNC: 0.62 MG/DL
M PROTEIN SPEC IFE-MCNC: NORMAL
THYROGLOB AB SERPL-ACNC: <20 IU/ML
THYROPEROXIDASE AB SERPL IA-ACNC: <10 IU/ML
TTG IGA SER IA-ACNC: 5.4 UNITS
TTG IGA SER-ACNC: NEGATIVE
TTG IGG SER IA-ACNC: <5 UNITS
TTG IGG SER IA-ACNC: NEGATIVE

## 2018-05-20 RX ORDER — AMOXICILLIN 500 MG/1
500 CAPSULE ORAL
Qty: 8 | Refills: 0 | Status: DISCONTINUED | COMMUNITY
Start: 2017-07-07 | End: 2018-05-20

## 2018-05-20 RX ORDER — MUPIROCIN 20 MG/G
2 OINTMENT TOPICAL
Qty: 32 | Refills: 0 | Status: DISCONTINUED | COMMUNITY
Start: 2017-03-10 | End: 2018-05-20

## 2018-06-08 ENCOUNTER — APPOINTMENT (OUTPATIENT)
Dept: RHEUMATOLOGY | Facility: CLINIC | Age: 63
End: 2018-06-08
Payer: MEDICAID

## 2018-06-08 VITALS
RESPIRATION RATE: 17 BRPM | BODY MASS INDEX: 38.27 KG/M2 | OXYGEN SATURATION: 98 % | SYSTOLIC BLOOD PRESSURE: 126 MMHG | DIASTOLIC BLOOD PRESSURE: 79 MMHG | TEMPERATURE: 98.5 F | HEIGHT: 63 IN | WEIGHT: 216 LBS | HEART RATE: 75 BPM

## 2018-06-08 DIAGNOSIS — M25.529 PAIN IN UNSPECIFIED ELBOW: ICD-10-CM

## 2018-06-08 DIAGNOSIS — G89.29 PAIN IN UNSPECIFIED ELBOW: ICD-10-CM

## 2018-06-08 PROCEDURE — 99214 OFFICE O/P EST MOD 30 MIN: CPT

## 2018-06-08 RX ORDER — MELOXICAM 15 MG/1
15 TABLET ORAL
Qty: 30 | Refills: 2 | Status: DISCONTINUED | COMMUNITY
Start: 2018-05-22 | End: 2018-06-08

## 2018-06-08 RX ORDER — CYCLOBENZAPRINE HYDROCHLORIDE 10 MG/1
10 TABLET, FILM COATED ORAL
Qty: 90 | Refills: 0 | Status: DISCONTINUED | COMMUNITY
Start: 2018-05-16 | End: 2018-06-08

## 2018-06-08 RX ORDER — GABAPENTIN 100 MG/1
100 CAPSULE ORAL 3 TIMES DAILY
Qty: 90 | Refills: 1 | Status: DISCONTINUED | COMMUNITY
Start: 2017-04-06 | End: 2018-06-08

## 2018-06-21 ENCOUNTER — APPOINTMENT (OUTPATIENT)
Dept: RHEUMATOLOGY | Facility: CLINIC | Age: 63
End: 2018-06-21

## 2018-09-21 ENCOUNTER — APPOINTMENT (OUTPATIENT)
Dept: RHEUMATOLOGY | Facility: CLINIC | Age: 63
End: 2018-09-21
Payer: MEDICAID

## 2018-09-21 VITALS
WEIGHT: 216 LBS | HEART RATE: 74 BPM | DIASTOLIC BLOOD PRESSURE: 80 MMHG | OXYGEN SATURATION: 98 % | HEIGHT: 63 IN | BODY MASS INDEX: 38.27 KG/M2 | TEMPERATURE: 98.8 F | SYSTOLIC BLOOD PRESSURE: 120 MMHG | RESPIRATION RATE: 17 BRPM

## 2018-09-21 DIAGNOSIS — M25.571 PAIN IN RIGHT ANKLE AND JOINTS OF RIGHT FOOT: ICD-10-CM

## 2018-09-21 DIAGNOSIS — M25.572 PAIN IN RIGHT ANKLE AND JOINTS OF RIGHT FOOT: ICD-10-CM

## 2018-09-21 DIAGNOSIS — G89.29 PAIN IN RIGHT ANKLE AND JOINTS OF RIGHT FOOT: ICD-10-CM

## 2018-09-21 PROBLEM — K21.9 GASTRO-ESOPHAGEAL REFLUX DISEASE WITHOUT ESOPHAGITIS: Chronic | Status: ACTIVE | Noted: 2017-03-07

## 2018-09-21 PROBLEM — M51.26 OTHER INTERVERTEBRAL DISC DISPLACEMENT, LUMBAR REGION: Chronic | Status: ACTIVE | Noted: 2017-03-07

## 2018-09-21 PROBLEM — K51.90 ULCERATIVE COLITIS, UNSPECIFIED, WITHOUT COMPLICATIONS: Chronic | Status: ACTIVE | Noted: 2017-03-07

## 2018-09-21 PROBLEM — M17.12 UNILATERAL PRIMARY OSTEOARTHRITIS, LEFT KNEE: Chronic | Status: ACTIVE | Noted: 2017-03-07

## 2018-09-21 PROBLEM — E66.9 OBESITY, UNSPECIFIED: Chronic | Status: ACTIVE | Noted: 2017-03-07

## 2018-09-21 PROBLEM — I10 ESSENTIAL (PRIMARY) HYPERTENSION: Chronic | Status: ACTIVE | Noted: 2017-03-07

## 2018-09-21 PROBLEM — M17.11 UNILATERAL PRIMARY OSTEOARTHRITIS, RIGHT KNEE: Chronic | Status: ACTIVE | Noted: 2017-03-20

## 2018-09-21 PROCEDURE — 99214 OFFICE O/P EST MOD 30 MIN: CPT | Mod: 25

## 2018-09-21 PROCEDURE — 36415 COLL VENOUS BLD VENIPUNCTURE: CPT

## 2018-09-21 PROCEDURE — 81003 URINALYSIS AUTO W/O SCOPE: CPT | Mod: QW

## 2018-09-21 RX ORDER — MILNACIPRAN HYDROCHLORIDE 12.5-25-5
12.5 & 25 & 5 KIT ORAL
Qty: 1 | Refills: 0 | Status: DISCONTINUED | COMMUNITY
Start: 2018-06-11 | End: 2018-09-21

## 2018-09-21 RX ORDER — DICLOFENAC SODIUM 10 MG/G
1 GEL TOPICAL
Qty: 6 | Refills: 0 | Status: DISCONTINUED | COMMUNITY
Start: 2018-06-10 | End: 2018-09-21

## 2018-09-21 RX ORDER — PANTOPRAZOLE 40 MG/1
40 TABLET, DELAYED RELEASE ORAL
Qty: 30 | Refills: 0 | Status: DISCONTINUED | COMMUNITY
Start: 2018-01-25 | End: 2018-09-21

## 2018-09-21 RX ORDER — MILNACIPRAN HYDROCHLORIDE 50 MG/1
50 TABLET, FILM COATED ORAL
Qty: 60 | Refills: 2 | Status: DISCONTINUED | COMMUNITY
Start: 2018-06-11 | End: 2018-09-21

## 2018-09-21 RX ORDER — DULOXETINE HYDROCHLORIDE 30 MG/1
30 CAPSULE, DELAYED RELEASE PELLETS ORAL
Qty: 90 | Refills: 1 | Status: DISCONTINUED | COMMUNITY
Start: 2018-06-08 | End: 2018-09-21

## 2018-09-23 LAB
25(OH)D3 SERPL-MCNC: 29.4 NG/ML
BILIRUB UR QL STRIP: NORMAL
CALCIUM SERPL-MCNC: 9.8 MG/DL
CK SERPL-CCNC: 93 U/L
CLARITY UR: CLEAR
COLLECTION METHOD: NORMAL
CRP SERPL-MCNC: 1.16 MG/DL
ERYTHROCYTE [SEDIMENTATION RATE] IN BLOOD BY WESTERGREN METHOD: 7 MM/HR
GLIADIN IGA SER QL: <5 UNITS
GLIADIN IGG SER QL: <5 UNITS
GLIADIN PEPTIDE IGA SER-ACNC: NEGATIVE
GLIADIN PEPTIDE IGG SER-ACNC: NEGATIVE
GLUCOSE UR-MCNC: NORMAL
HCG UR QL: 0.2 EU/DL
HGB UR QL STRIP.AUTO: NORMAL
KETONES UR-MCNC: NORMAL
LEUKOCYTE ESTERASE UR QL STRIP: NORMAL
NITRITE UR QL STRIP: NORMAL
PARATHYROID HORMONE INTACT: 44 PG/ML
PH UR STRIP: 6
PHOSPHATE SERPL-MCNC: 3 MG/DL
PROT UR STRIP-MCNC: NORMAL
SP GR UR STRIP: 1.01
TTG IGA SER IA-ACNC: <5 UNITS
TTG IGA SER-ACNC: NEGATIVE
TTG IGG SER IA-ACNC: <5 UNITS
TTG IGG SER IA-ACNC: NEGATIVE

## 2018-09-24 ENCOUNTER — MEDICATION RENEWAL (OUTPATIENT)
Age: 63
End: 2018-09-24

## 2018-09-25 LAB
ENDOMYSIUM IGA SER QL: NEGATIVE
ENDOMYSIUM IGA TITR SER: NORMAL

## 2018-10-06 ENCOUNTER — MEDICATION RENEWAL (OUTPATIENT)
Age: 63
End: 2018-10-06

## 2018-12-08 ENCOUNTER — RX RENEWAL (OUTPATIENT)
Age: 63
End: 2018-12-08

## 2018-12-15 ENCOUNTER — RX RENEWAL (OUTPATIENT)
Age: 63
End: 2018-12-15

## 2019-01-09 ENCOUNTER — RX RENEWAL (OUTPATIENT)
Age: 64
End: 2019-01-09

## 2019-01-09 ENCOUNTER — APPOINTMENT (OUTPATIENT)
Dept: RHEUMATOLOGY | Facility: CLINIC | Age: 64
End: 2019-01-09
Payer: MEDICARE

## 2019-01-09 VITALS
DIASTOLIC BLOOD PRESSURE: 64 MMHG | BODY MASS INDEX: 39.93 KG/M2 | TEMPERATURE: 98.4 F | WEIGHT: 217 LBS | SYSTOLIC BLOOD PRESSURE: 132 MMHG | HEIGHT: 62 IN | OXYGEN SATURATION: 96 % | RESPIRATION RATE: 17 BRPM | HEART RATE: 92 BPM

## 2019-01-09 DIAGNOSIS — M25.552 PAIN IN RIGHT HIP: ICD-10-CM

## 2019-01-09 DIAGNOSIS — M25.551 PAIN IN RIGHT HIP: ICD-10-CM

## 2019-01-09 PROCEDURE — 99214 OFFICE O/P EST MOD 30 MIN: CPT | Mod: 25

## 2019-01-09 PROCEDURE — 36415 COLL VENOUS BLD VENIPUNCTURE: CPT

## 2019-01-09 RX ORDER — ALENDRONATE SODIUM 70 MG/1
70 TABLET ORAL
Qty: 4 | Refills: 0 | Status: DISCONTINUED | COMMUNITY
Start: 2018-09-22 | End: 2019-01-09

## 2019-01-10 LAB
BASOPHILS # BLD AUTO: 0.03 K/UL
BASOPHILS NFR BLD AUTO: 0.5 %
EOSINOPHIL # BLD AUTO: 0.08 K/UL
EOSINOPHIL NFR BLD AUTO: 1.4 %
HCT VFR BLD CALC: 41.2 %
HGB BLD-MCNC: 13.1 G/DL
IMM GRANULOCYTES NFR BLD AUTO: 0.3 %
LYMPHOCYTES # BLD AUTO: 1.41 K/UL
LYMPHOCYTES NFR BLD AUTO: 23.9 %
MAN DIFF?: NORMAL
MCHC RBC-ENTMCNC: 28.7 PG
MCHC RBC-ENTMCNC: 31.8 GM/DL
MCV RBC AUTO: 90.2 FL
MONOCYTES # BLD AUTO: 0.44 K/UL
MONOCYTES NFR BLD AUTO: 7.5 %
NEUTROPHILS # BLD AUTO: 3.92 K/UL
NEUTROPHILS NFR BLD AUTO: 66.4 %
PLATELET # BLD AUTO: 324 K/UL
RBC # BLD: 4.57 M/UL
RBC # FLD: 13.4 %
WBC # FLD AUTO: 5.9 K/UL

## 2019-01-12 LAB
25(OH)D3 SERPL-MCNC: 45.5 NG/ML
ALBUMIN SERPL ELPH-MCNC: 4.7 G/DL
ALP BLD-CCNC: 65 U/L
ALT SERPL-CCNC: 16 U/L
ANION GAP SERPL CALC-SCNC: 12 MMOL/L
AST SERPL-CCNC: 16 U/L
BILIRUB SERPL-MCNC: 0.3 MG/DL
BUN SERPL-MCNC: 18 MG/DL
CALCIUM SERPL-MCNC: 9.5 MG/DL
CHLORIDE SERPL-SCNC: 104 MMOL/L
CK SERPL-CCNC: 93 U/L
CO2 SERPL-SCNC: 26 MMOL/L
CREAT SERPL-MCNC: 0.61 MG/DL
CRP SERPL-MCNC: 1.29 MG/DL
ERYTHROCYTE [SEDIMENTATION RATE] IN BLOOD BY WESTERGREN METHOD: 16 MM/HR
GLUCOSE SERPL-MCNC: 165 MG/DL
PHOSPHATE SERPL-MCNC: 3.2 MG/DL
POTASSIUM SERPL-SCNC: 4.3 MMOL/L
PROT SERPL-MCNC: 6.9 G/DL
SODIUM SERPL-SCNC: 142 MMOL/L

## 2019-01-14 NOTE — PHYSICAL EXAM
[General Appearance - Alert] : alert [General Appearance - In No Acute Distress] : in no acute distress [General Appearance - Well Nourished] : well nourished [General Appearance - Well Developed] : well developed [General Appearance - Well-Appearing] : healthy appearing [Sclera] : the sclera and conjunctiva were normal [PERRL With Normal Accommodation] : pupils were equal in size, round, and reactive to light [Extraocular Movements] : extraocular movements were intact [Outer Ear] : the ears and nose were normal in appearance [Oropharynx] : the oropharynx was normal [Neck Appearance] : the appearance of the neck was normal [Neck Cervical Mass (___cm)] : no neck mass was observed [Jugular Venous Distention Increased] : there was no jugular-venous distention [Thyroid Diffuse Enlargement] : the thyroid was not enlarged [Thyroid Nodule] : there were no palpable thyroid nodules [Bowel Sounds] : normal bowel sounds [Abdomen Soft] : soft [Abdomen Tenderness] : non-tender [] : no hepato-splenomegaly [Abdomen Mass (___ Cm)] : no abdominal mass palpated [No CVA Tenderness] : no ~M costovertebral angle tenderness [No Spinal Tenderness] : no spinal tenderness [FreeTextEntry1] : Shoulders--normal; Elbows--Normal\par Wrists--Normal\par Hands-Early Jojo's and Heberden's nodes\par Hips-bilateral decreased external rotation;Tender bilateral greater trochanters\par Knees-bilateral crepitus;Right flexed 90 deg\par Ankles-normal; Feet--Normal

## 2019-04-07 ENCOUNTER — RX RENEWAL (OUTPATIENT)
Age: 64
End: 2019-04-07

## 2019-04-08 ENCOUNTER — LABORATORY RESULT (OUTPATIENT)
Age: 64
End: 2019-04-08

## 2019-04-08 ENCOUNTER — APPOINTMENT (OUTPATIENT)
Dept: RHEUMATOLOGY | Facility: CLINIC | Age: 64
End: 2019-04-08
Payer: MEDICARE

## 2019-04-08 VITALS
OXYGEN SATURATION: 96 % | RESPIRATION RATE: 18 BRPM | DIASTOLIC BLOOD PRESSURE: 68 MMHG | TEMPERATURE: 98.8 F | HEART RATE: 78 BPM | SYSTOLIC BLOOD PRESSURE: 112 MMHG

## 2019-04-08 DIAGNOSIS — M25.562 PAIN IN RIGHT KNEE: ICD-10-CM

## 2019-04-08 DIAGNOSIS — M25.561 PAIN IN RIGHT KNEE: ICD-10-CM

## 2019-04-08 PROCEDURE — 99215 OFFICE O/P EST HI 40 MIN: CPT | Mod: 25

## 2019-04-08 PROCEDURE — 36415 COLL VENOUS BLD VENIPUNCTURE: CPT

## 2019-04-09 RX ORDER — FAMOTIDINE 20 MG/1
20 TABLET, FILM COATED ORAL
Qty: 30 | Refills: 0 | Status: DISCONTINUED | COMMUNITY
Start: 2018-09-06

## 2019-04-09 RX ORDER — OXYCODONE AND ACETAMINOPHEN 5; 325 MG/1; MG/1
5-325 TABLET ORAL
Qty: 15 | Refills: 0 | Status: DISCONTINUED | COMMUNITY
Start: 2018-10-29

## 2019-04-09 RX ORDER — CELECOXIB 200 MG/1
200 CAPSULE ORAL
Qty: 90 | Refills: 0 | Status: DISCONTINUED | COMMUNITY
End: 2019-04-09

## 2019-04-09 RX ORDER — OMEPRAZOLE 40 MG/1
40 CAPSULE, DELAYED RELEASE ORAL
Refills: 0 | Status: DISCONTINUED | COMMUNITY
End: 2019-04-09

## 2019-04-10 LAB
ALBUMIN SERPL ELPH-MCNC: 4.7 G/DL
ALP BLD-CCNC: 61 U/L
ALT SERPL-CCNC: 18 U/L
ANION GAP SERPL CALC-SCNC: 13 MMOL/L
APPEARANCE: ABNORMAL
AST SERPL-CCNC: 17 U/L
B BURGDOR IGG+IGM SER QL IB: NORMAL
BACTERIA: NEGATIVE
BASOPHILS # BLD AUTO: 0.05 K/UL
BASOPHILS NFR BLD AUTO: 0.9 %
BILIRUB SERPL-MCNC: 0.4 MG/DL
BILIRUB UR QL STRIP: NORMAL
BILIRUBIN URINE: NEGATIVE
BLOOD URINE: NEGATIVE
BUN SERPL-MCNC: 23 MG/DL
CALCIUM SERPL-MCNC: 10.2 MG/DL
CHLORIDE SERPL-SCNC: 102 MMOL/L
CK SERPL-CCNC: 91 U/L
CLARITY UR: CLEAR
CO2 SERPL-SCNC: 26 MMOL/L
COLLECTION METHOD: NORMAL
COLOR: ABNORMAL
CREAT SERPL-MCNC: 0.58 MG/DL
CRP SERPL-MCNC: 1.02 MG/DL
DEPRECATED KAPPA LC FREE/LAMBDA SER: 1.07 RATIO
ENA SS-A AB SER IA-ACNC: <0.2 AL
ENA SS-B AB SER IA-ACNC: <0.2 AL
EOSINOPHIL # BLD AUTO: 0.09 K/UL
EOSINOPHIL NFR BLD AUTO: 1.6 %
ERYTHROCYTE [SEDIMENTATION RATE] IN BLOOD BY WESTERGREN METHOD: 10 MM/HR
GLUCOSE QUALITATIVE U: NEGATIVE
GLUCOSE SERPL-MCNC: 106 MG/DL
GLUCOSE UR-MCNC: NORMAL
HCG UR QL: 0.2 EU/DL
HCT VFR BLD CALC: 40.9 %
HGB BLD-MCNC: 12.8 G/DL
HGB UR QL STRIP.AUTO: NORMAL
HYALINE CASTS: 4 /LPF
IGA SER QL IEP: 168 MG/DL
IGG SER QL IEP: 790 MG/DL
IGM SER QL IEP: 44 MG/DL
IMM GRANULOCYTES NFR BLD AUTO: 0.5 %
KAPPA LC CSF-MCNC: 0.87 MG/DL
KAPPA LC SERPL-MCNC: 0.93 MG/DL
KETONES UR-MCNC: NORMAL
KETONES URINE: NEGATIVE
LEUKOCYTE ESTERASE UR QL STRIP: NORMAL
LEUKOCYTE ESTERASE URINE: ABNORMAL
LYMPHOCYTES # BLD AUTO: 1.65 K/UL
LYMPHOCYTES NFR BLD AUTO: 29.4 %
M PROTEIN SPEC IFE-MCNC: NORMAL
MAGNESIUM SERPL-MCNC: 2.1 MG/DL
MAN DIFF?: NORMAL
MCHC RBC-ENTMCNC: 28.7 PG
MCHC RBC-ENTMCNC: 31.3 GM/DL
MCV RBC AUTO: 91.7 FL
MICROSCOPIC-UA: NORMAL
MONOCYTES # BLD AUTO: 0.61 K/UL
MONOCYTES NFR BLD AUTO: 10.9 %
NEUTROPHILS # BLD AUTO: 3.19 K/UL
NEUTROPHILS NFR BLD AUTO: 56.7 %
NITRITE UR QL STRIP: NORMAL
NITRITE URINE: NEGATIVE
PH UR STRIP: 5.5
PH URINE: 6
PHOSPHATE SERPL-MCNC: 4.1 MG/DL
PLATELET # BLD AUTO: 288 K/UL
POTASSIUM SERPL-SCNC: 4 MMOL/L
PROT SERPL-MCNC: 7 G/DL
PROT UR STRIP-MCNC: NORMAL
PROTEIN URINE: NORMAL
RBC # BLD: 4.46 M/UL
RBC # FLD: 13.6 %
RED BLOOD CELLS URINE: 3 /HPF
SODIUM SERPL-SCNC: 141 MMOL/L
SP GR UR STRIP: >=1.03
SPECIFIC GRAVITY URINE: 1.04
SQUAMOUS EPITHELIAL CELLS: 3 /HPF
TSH SERPL-ACNC: 2.59 UIU/ML
UROBILINOGEN URINE: NORMAL
WBC # FLD AUTO: 5.62 K/UL
WHITE BLOOD CELLS URINE: 16 /HPF

## 2019-04-11 ENCOUNTER — CLINICAL ADVICE (OUTPATIENT)
Age: 64
End: 2019-04-11

## 2019-06-04 ENCOUNTER — APPOINTMENT (OUTPATIENT)
Dept: RHEUMATOLOGY | Facility: CLINIC | Age: 64
End: 2019-06-04

## 2019-06-20 ENCOUNTER — APPOINTMENT (OUTPATIENT)
Dept: RHEUMATOLOGY | Facility: CLINIC | Age: 64
End: 2019-06-20
Payer: MEDICARE

## 2019-06-20 VITALS
TEMPERATURE: 98.2 F | HEIGHT: 62 IN | WEIGHT: 213 LBS | HEART RATE: 72 BPM | OXYGEN SATURATION: 98 % | BODY MASS INDEX: 39.2 KG/M2 | SYSTOLIC BLOOD PRESSURE: 116 MMHG | DIASTOLIC BLOOD PRESSURE: 68 MMHG | RESPIRATION RATE: 17 BRPM

## 2019-06-20 DIAGNOSIS — M70.61 TROCHANTERIC BURSITIS, RIGHT HIP: ICD-10-CM

## 2019-06-20 DIAGNOSIS — M70.62 TROCHANTERIC BURSITIS, RIGHT HIP: ICD-10-CM

## 2019-06-20 PROCEDURE — 36415 COLL VENOUS BLD VENIPUNCTURE: CPT

## 2019-06-20 PROCEDURE — 99215 OFFICE O/P EST HI 40 MIN: CPT | Mod: 25

## 2019-06-20 PROCEDURE — 81003 URINALYSIS AUTO W/O SCOPE: CPT | Mod: QW

## 2019-06-20 RX ORDER — ASPIRIN 81 MG
81 TABLET, DELAYED RELEASE (ENTERIC COATED) ORAL
Refills: 0 | Status: DISCONTINUED | COMMUNITY
End: 2019-06-20

## 2019-06-23 LAB
ALBUMIN SERPL ELPH-MCNC: 4.7 G/DL
ALP BLD-CCNC: 62 U/L
ALT SERPL-CCNC: 11 U/L
ANION GAP SERPL CALC-SCNC: 11 MMOL/L
AST SERPL-CCNC: 6 U/L
BASOPHILS # BLD AUTO: 0.06 K/UL
BASOPHILS NFR BLD AUTO: 1.2 %
BILIRUB SERPL-MCNC: 0.4 MG/DL
BILIRUB UR QL STRIP: NORMAL
BUN SERPL-MCNC: 25 MG/DL
CALCIUM SERPL-MCNC: 10.1 MG/DL
CHLORIDE SERPL-SCNC: 101 MMOL/L
CK SERPL-CCNC: 83 U/L
CLARITY UR: NORMAL
CO2 SERPL-SCNC: 28 MMOL/L
COLLECTION METHOD: NORMAL
CREAT SERPL-MCNC: 0.62 MG/DL
CRP SERPL-MCNC: 1 MG/DL
EOSINOPHIL # BLD AUTO: 0.08 K/UL
EOSINOPHIL NFR BLD AUTO: 1.6 %
ERYTHROCYTE [SEDIMENTATION RATE] IN BLOOD BY WESTERGREN METHOD: 18 MM/HR
GLUCOSE SERPL-MCNC: 101 MG/DL
GLUCOSE UR-MCNC: NORMAL
HCG UR QL: 0.2 EU/DL
HCT VFR BLD CALC: 40.9 %
HGB BLD-MCNC: 13 G/DL
HGB UR QL STRIP.AUTO: NORMAL
IMM GRANULOCYTES NFR BLD AUTO: 1 %
KETONES UR-MCNC: NORMAL
LEUKOCYTE ESTERASE UR QL STRIP: NORMAL
LYMPHOCYTES # BLD AUTO: 1.45 K/UL
LYMPHOCYTES NFR BLD AUTO: 28.8 %
MAN DIFF?: NORMAL
MCHC RBC-ENTMCNC: 29.8 PG
MCHC RBC-ENTMCNC: 31.8 GM/DL
MCV RBC AUTO: 93.8 FL
MONOCYTES # BLD AUTO: 0.46 K/UL
MONOCYTES NFR BLD AUTO: 9.1 %
NEUTROPHILS # BLD AUTO: 2.94 K/UL
NEUTROPHILS NFR BLD AUTO: 58.3 %
NITRITE UR QL STRIP: NORMAL
PH UR STRIP: 6.5
PHOSPHATE SERPL-MCNC: 3.3 MG/DL
PLATELET # BLD AUTO: 331 K/UL
POTASSIUM SERPL-SCNC: 4.9 MMOL/L
PROT SERPL-MCNC: 7.2 G/DL
PROT UR STRIP-MCNC: NORMAL
RBC # BLD: 4.36 M/UL
RBC # FLD: 12.3 %
SODIUM SERPL-SCNC: 140 MMOL/L
SP GR UR STRIP: 1.02
WBC # FLD AUTO: 5.04 K/UL

## 2019-07-06 ENCOUNTER — RX RENEWAL (OUTPATIENT)
Age: 64
End: 2019-07-06

## 2019-07-26 ENCOUNTER — MEDICATION RENEWAL (OUTPATIENT)
Age: 64
End: 2019-07-26

## 2019-07-26 ENCOUNTER — CLINICAL ADVICE (OUTPATIENT)
Age: 64
End: 2019-07-26

## 2019-10-04 ENCOUNTER — APPOINTMENT (OUTPATIENT)
Dept: RHEUMATOLOGY | Facility: CLINIC | Age: 64
End: 2019-10-04

## 2019-10-21 ENCOUNTER — RX RENEWAL (OUTPATIENT)
Age: 64
End: 2019-10-21

## 2019-10-23 ENCOUNTER — CLINICAL ADVICE (OUTPATIENT)
Age: 64
End: 2019-10-23

## 2019-11-07 ENCOUNTER — APPOINTMENT (OUTPATIENT)
Dept: RHEUMATOLOGY | Facility: CLINIC | Age: 64
End: 2019-11-07
Payer: MEDICARE

## 2019-11-07 VITALS
DIASTOLIC BLOOD PRESSURE: 72 MMHG | BODY MASS INDEX: 38.64 KG/M2 | HEIGHT: 62 IN | TEMPERATURE: 98.3 F | SYSTOLIC BLOOD PRESSURE: 110 MMHG | OXYGEN SATURATION: 97 % | HEART RATE: 95 BPM | RESPIRATION RATE: 17 BRPM | WEIGHT: 210 LBS

## 2019-11-07 DIAGNOSIS — M25.541 PAIN IN JOINTS OF RIGHT HAND: ICD-10-CM

## 2019-11-07 PROCEDURE — 36415 COLL VENOUS BLD VENIPUNCTURE: CPT

## 2019-11-07 PROCEDURE — 99214 OFFICE O/P EST MOD 30 MIN: CPT | Mod: 25

## 2019-11-07 PROCEDURE — 81003 URINALYSIS AUTO W/O SCOPE: CPT | Mod: QW

## 2019-11-07 RX ORDER — SULINDAC 200 MG/1
200 TABLET ORAL
Qty: 180 | Refills: 0 | Status: DISCONTINUED | COMMUNITY
Start: 2019-04-08 | End: 2019-11-07

## 2019-11-07 RX ORDER — OMEPRAZOLE 20 MG/1
20 CAPSULE, DELAYED RELEASE ORAL DAILY
Qty: 90 | Refills: 0 | Status: DISCONTINUED | COMMUNITY
Start: 2019-04-08 | End: 2019-11-07

## 2019-11-07 RX ORDER — CYCLOBENZAPRINE HYDROCHLORIDE 10 MG/1
10 TABLET, FILM COATED ORAL
Qty: 90 | Refills: 0 | Status: DISCONTINUED | COMMUNITY
Start: 2019-04-09 | End: 2019-11-07

## 2019-11-18 LAB
ALBUMIN SERPL ELPH-MCNC: 4.6 G/DL
ALP BLD-CCNC: 65 U/L
ALT SERPL-CCNC: 18 U/L
ANION GAP SERPL CALC-SCNC: 13 MMOL/L
AST SERPL-CCNC: 16 U/L
BASOPHILS # BLD AUTO: 0.06 K/UL
BASOPHILS NFR BLD AUTO: 1.5 %
BILIRUB SERPL-MCNC: 0.4 MG/DL
BILIRUB UR QL STRIP: NORMAL
BUN SERPL-MCNC: 17 MG/DL
CALCIUM SERPL-MCNC: 9.9 MG/DL
CHLORIDE SERPL-SCNC: 100 MMOL/L
CK SERPL-CCNC: 77 U/L
CLARITY UR: CLEAR
CO2 SERPL-SCNC: 26 MMOL/L
COLLECTION METHOD: NORMAL
CREAT SERPL-MCNC: 0.63 MG/DL
CRP SERPL-MCNC: 1.38 MG/DL
EOSINOPHIL # BLD AUTO: 0.07 K/UL
EOSINOPHIL NFR BLD AUTO: 1.7 %
ERYTHROCYTE [SEDIMENTATION RATE] IN BLOOD BY WESTERGREN METHOD: 11 MM/HR
GLUCOSE SERPL-MCNC: 96 MG/DL
GLUCOSE UR-MCNC: NORMAL
HCG UR QL: 0.2 EU/DL
HCT VFR BLD CALC: 39 %
HGB BLD-MCNC: 12.7 G/DL
HGB UR QL STRIP.AUTO: NORMAL
IMM GRANULOCYTES NFR BLD AUTO: 0.7 %
KETONES UR-MCNC: NORMAL
LEUKOCYTE ESTERASE UR QL STRIP: NORMAL
LYMPHOCYTES # BLD AUTO: 1.02 K/UL
LYMPHOCYTES NFR BLD AUTO: 24.7 %
MAN DIFF?: NORMAL
MCHC RBC-ENTMCNC: 30.2 PG
MCHC RBC-ENTMCNC: 32.6 GM/DL
MCV RBC AUTO: 92.6 FL
MONOCYTES # BLD AUTO: 0.59 K/UL
MONOCYTES NFR BLD AUTO: 14.3 %
NEUTROPHILS # BLD AUTO: 2.36 K/UL
NEUTROPHILS NFR BLD AUTO: 57.1 %
NITRITE UR QL STRIP: NORMAL
PH UR STRIP: 7
PHOSPHATE SERPL-MCNC: 4 MG/DL
PLATELET # BLD AUTO: 307 K/UL
POTASSIUM SERPL-SCNC: 4.3 MMOL/L
PROT SERPL-MCNC: 7 G/DL
PROT UR STRIP-MCNC: NORMAL
RBC # BLD: 4.21 M/UL
RBC # FLD: 13 %
SODIUM SERPL-SCNC: 139 MMOL/L
SP GR UR STRIP: 1.02
WBC # FLD AUTO: 4.13 K/UL

## 2020-01-19 ENCOUNTER — RX RENEWAL (OUTPATIENT)
Age: 65
End: 2020-01-19

## 2020-02-01 ENCOUNTER — RX RENEWAL (OUTPATIENT)
Age: 65
End: 2020-02-01

## 2020-03-12 ENCOUNTER — APPOINTMENT (OUTPATIENT)
Dept: GASTROENTEROLOGY | Facility: CLINIC | Age: 65
End: 2020-03-12
Payer: MEDICARE

## 2020-03-12 VITALS
OXYGEN SATURATION: 98 % | HEART RATE: 65 BPM | WEIGHT: 214 LBS | BODY MASS INDEX: 39.38 KG/M2 | DIASTOLIC BLOOD PRESSURE: 77 MMHG | SYSTOLIC BLOOD PRESSURE: 158 MMHG | RESPIRATION RATE: 16 BRPM | HEIGHT: 62 IN

## 2020-03-12 PROCEDURE — 99204 OFFICE O/P NEW MOD 45 MIN: CPT

## 2020-03-12 NOTE — ASSESSMENT
[FreeTextEntry1] : i discussed with the patient his symptoms are more likely related to the sulfa part of the medication rather than the 5 ASA part. I therefore prescribed mesalamine DR 1.2 g 2 pills per day. She will let me know if there is any issues with this otherwise will followup in 6 months

## 2020-03-12 NOTE — PHYSICAL EXAM
[General Appearance - Alert] : alert [General Appearance - In No Acute Distress] : in no acute distress [Sclera] : the sclera and conjunctiva were normal [PERRL With Normal Accommodation] : pupils were equal in size, round, and reactive to light [Extraocular Movements] : extraocular movements were intact [Outer Ear] : the ears and nose were normal in appearance [Oropharynx] : the oropharynx was normal [Neck Appearance] : the appearance of the neck was normal [Neck Cervical Mass (___cm)] : no neck mass was observed [Jugular Venous Distention Increased] : there was no jugular-venous distention [Thyroid Diffuse Enlargement] : the thyroid was not enlarged [Thyroid Nodule] : there were no palpable thyroid nodules [Auscultation Breath Sounds / Voice Sounds] : lungs were clear to auscultation bilaterally [Heart Rate And Rhythm] : heart rate was normal and rhythm regular [Heart Sounds] : normal S1 and S2 [Heart Sounds Gallop] : no gallops [Murmurs] : no murmurs [Heart Sounds Pericardial Friction Rub] : no pericardial rub [Edema] : there was no peripheral edema [Bowel Sounds] : normal bowel sounds [Abdomen Soft] : soft [Abdomen Tenderness] : non-tender [Abdomen Mass (___ Cm)] : no abdominal mass palpated [No CVA Tenderness] : no ~M costovertebral angle tenderness [] : no rash [Oriented To Time, Place, And Person] : oriented to person, place, and time [Impaired Insight] : insight and judgment were intact [Affect] : the affect was normal

## 2020-03-12 NOTE — HISTORY OF PRESENT ILLNESS
[de-identified] : patient with long-standing history of ulcerative clots. She had been taking sulfasalazine. She was last seen by me many years ago. Since she switch to another gastroenterologist for insurance reasons. She had a colonoscopy last month by this physician did not see any abnormalities except for 3 polyps removed in the rectum which were just inflammatory most likely pseudopolyps. There was no evidence of dysplasia. The patient now returns here again because of insurance issues. Patient states that she was feeling lightheaded and not well and stopped the sulfasalazine and her symptoms went away. She is also looking for some medication to replace the sulfasalazine.

## 2020-04-13 ENCOUNTER — RX CHANGE (OUTPATIENT)
Age: 65
End: 2020-04-13

## 2020-04-15 ENCOUNTER — RX CHANGE (OUTPATIENT)
Age: 65
End: 2020-04-15

## 2020-05-08 ENCOUNTER — RX CHANGE (OUTPATIENT)
Age: 65
End: 2020-05-08

## 2020-06-11 ENCOUNTER — RX CHANGE (OUTPATIENT)
Age: 65
End: 2020-06-11

## 2020-06-18 ENCOUNTER — APPOINTMENT (OUTPATIENT)
Dept: RHEUMATOLOGY | Facility: CLINIC | Age: 65
End: 2020-06-18
Payer: MEDICARE

## 2020-06-18 PROCEDURE — 99215 OFFICE O/P EST HI 40 MIN: CPT | Mod: 95

## 2020-06-21 RX ORDER — SULINDAC 200 MG/1
200 TABLET ORAL
Qty: 180 | Refills: 0 | Status: DISCONTINUED | COMMUNITY
End: 2020-06-21

## 2020-06-21 RX ORDER — SULFASALAZINE 500 MG/1
500 TABLET ORAL
Refills: 0 | Status: DISCONTINUED | COMMUNITY
End: 2020-06-21

## 2020-06-21 RX ORDER — LOSARTAN POTASSIUM 50 MG/1
50 TABLET, FILM COATED ORAL
Qty: 30 | Refills: 0 | Status: DISCONTINUED | COMMUNITY
Start: 2020-06-09

## 2020-06-21 RX ORDER — RISEDRONATE SODIUM 150 MG/1
150 TABLET, FILM COATED ORAL
Qty: 2 | Refills: 0 | Status: DISCONTINUED | COMMUNITY
Start: 2019-01-09 | End: 2020-06-21

## 2020-06-21 RX ORDER — DULOXETINE HYDROCHLORIDE 30 MG/1
30 CAPSULE, DELAYED RELEASE PELLETS ORAL
Qty: 30 | Refills: 0 | Status: DISCONTINUED | COMMUNITY
Start: 2019-12-14 | End: 2020-06-21

## 2020-06-21 RX ORDER — DICYCLOMINE HYDROCHLORIDE 20 MG/1
20 TABLET ORAL
Qty: 40 | Refills: 0 | Status: DISCONTINUED | COMMUNITY
Start: 2020-02-17

## 2020-06-21 RX ORDER — CYCLOBENZAPRINE HYDROCHLORIDE 10 MG/1
10 TABLET, FILM COATED ORAL
Qty: 30 | Refills: 0 | Status: DISCONTINUED | COMMUNITY
Start: 2019-06-20 | End: 2020-06-21

## 2020-06-21 RX ORDER — HYDROCHLOROTHIAZIDE 12.5 MG/1
12.5 TABLET ORAL
Qty: 90 | Refills: 0 | Status: DISCONTINUED | COMMUNITY
Start: 2020-05-23

## 2020-07-02 ENCOUNTER — TRANSCRIPTION ENCOUNTER (OUTPATIENT)
Age: 65
End: 2020-07-02

## 2020-07-16 ENCOUNTER — APPOINTMENT (OUTPATIENT)
Dept: GASTROENTEROLOGY | Facility: CLINIC | Age: 65
End: 2020-07-16
Payer: MEDICARE

## 2020-07-16 VITALS
HEART RATE: 76 BPM | RESPIRATION RATE: 14 BRPM | WEIGHT: 22 LBS | TEMPERATURE: 97.9 F | DIASTOLIC BLOOD PRESSURE: 84 MMHG | BODY MASS INDEX: 4.02 KG/M2 | OXYGEN SATURATION: 97 % | SYSTOLIC BLOOD PRESSURE: 120 MMHG

## 2020-07-16 PROCEDURE — 99214 OFFICE O/P EST MOD 30 MIN: CPT

## 2020-07-16 NOTE — ASSESSMENT
[FreeTextEntry1] : I discussed with the patient that her issues with limits blood paper and pruritus in eye R. perianal issues and she just had a recent unremarkable colonoscopy. I advised her to use Balmex cream twice a day to see if this helps ameliorating her symptoms. As for her bloating gas patient does eat a lot of yogurt and cheese his NG but most likely is lactose intolerant given the fact that she has also colitis and her ethnic background. I advised to try to stay on a lactose-free diet but if she does have any lactose-containing products she she should take lactase supplements. She will call me in a few weeks to let me now she is doing.Otherwise she'll routine followup in 6 months or p.r.n.

## 2020-07-16 NOTE — PHYSICAL EXAM
[General Appearance - Alert] : alert [General Appearance - In No Acute Distress] : in no acute distress [Sclera] : the sclera and conjunctiva were normal [PERRL With Normal Accommodation] : pupils were equal in size, round, and reactive to light [Extraocular Movements] : extraocular movements were intact [Oropharynx] : the oropharynx was normal [Outer Ear] : the ears and nose were normal in appearance [Neck Appearance] : the appearance of the neck was normal [Neck Cervical Mass (___cm)] : no neck mass was observed [Jugular Venous Distention Increased] : there was no jugular-venous distention [Thyroid Nodule] : there were no palpable thyroid nodules [Thyroid Diffuse Enlargement] : the thyroid was not enlarged [Auscultation Breath Sounds / Voice Sounds] : lungs were clear to auscultation bilaterally [Heart Sounds] : normal S1 and S2 [Heart Rate And Rhythm] : heart rate was normal and rhythm regular [Murmurs] : no murmurs [Heart Sounds Gallop] : no gallops [Heart Sounds Pericardial Friction Rub] : no pericardial rub [Bowel Sounds] : normal bowel sounds [Abdomen Tenderness] : non-tender [Abdomen Soft] : soft [Abdomen Mass (___ Cm)] : no abdominal mass palpated [] : no hepato-splenomegaly [Oriented To Time, Place, And Person] : oriented to person, place, and time [Affect] : the affect was normal [Impaired Insight] : insight and judgment were intact

## 2020-07-16 NOTE — HISTORY OF PRESENT ILLNESS
[de-identified] : atient with chronic ulcerative colitis.She takes mesalamine and has normal bowel movements every day.Her real complaints or she has some pruritusin the perianal region as well as some intermittent blood on the toilet paper. She an unremarkable colonoscopy done by another physician last January. In addition she complains of bloating and eructation. She has no nausea vomiting or weight loss.he has no extraintestinal manifestations of her disease.

## 2020-09-04 ENCOUNTER — APPOINTMENT (OUTPATIENT)
Dept: PULMONOLOGY | Facility: CLINIC | Age: 65
End: 2020-09-04
Payer: MEDICARE

## 2020-09-04 VITALS
BODY MASS INDEX: 40.24 KG/M2 | OXYGEN SATURATION: 98 % | SYSTOLIC BLOOD PRESSURE: 120 MMHG | RESPIRATION RATE: 16 BRPM | DIASTOLIC BLOOD PRESSURE: 70 MMHG | HEART RATE: 80 BPM | WEIGHT: 220 LBS

## 2020-09-04 PROCEDURE — 99203 OFFICE O/P NEW LOW 30 MIN: CPT

## 2020-09-04 RX ORDER — MONTELUKAST 10 MG/1
10 TABLET, FILM COATED ORAL
Refills: 0 | Status: COMPLETED | COMMUNITY
End: 2020-09-04

## 2020-09-04 RX ORDER — OMEPRAZOLE 40 MG/1
40 CAPSULE, DELAYED RELEASE ORAL
Refills: 0 | Status: COMPLETED | COMMUNITY
End: 2020-09-04

## 2020-09-08 ENCOUNTER — RX RENEWAL (OUTPATIENT)
Age: 65
End: 2020-09-08

## 2020-09-08 NOTE — HISTORY OF PRESENT ILLNESS
[Never] : never [TextBox_4] : 65F PMH ulcerative colitis, fibromyalgia, osteoarthritis who presents with not being able to take a deep breath. Not feeling short of breath or gasping for air. No coughing. Never a smoker. She is retired, worked as a seamstress. Denies any exposure to dust, fumes, chemicals. She does feel tired during the day. She feels she has no energy, is 'weak.' She has to make a conscientious effort to try to breathe 'all the way.' She reports gaining a 'couple' of pounds during the COVID-19 pandemic. Her Treadwell sleepiness scale is 4/24. Denies snoring. Denies waking up in middle of night gasping for air. No F/C, CP, palpitations, N/V/D. She reports some wheezing.

## 2020-09-19 ENCOUNTER — APPOINTMENT (OUTPATIENT)
Dept: DISASTER EMERGENCY | Facility: CLINIC | Age: 65
End: 2020-09-19

## 2020-09-24 ENCOUNTER — APPOINTMENT (OUTPATIENT)
Dept: PULMONOLOGY | Facility: CLINIC | Age: 65
End: 2020-09-24
Payer: MEDICARE

## 2020-09-24 ENCOUNTER — APPOINTMENT (OUTPATIENT)
Dept: GASTROENTEROLOGY | Facility: CLINIC | Age: 65
End: 2020-09-24
Payer: MEDICARE

## 2020-09-24 VITALS
WEIGHT: 224 LBS | HEART RATE: 62 BPM | DIASTOLIC BLOOD PRESSURE: 80 MMHG | HEIGHT: 62 IN | TEMPERATURE: 97.9 F | SYSTOLIC BLOOD PRESSURE: 130 MMHG | BODY MASS INDEX: 41.22 KG/M2

## 2020-09-24 DIAGNOSIS — R06.2 WHEEZING: ICD-10-CM

## 2020-09-24 PROCEDURE — 94729 DIFFUSING CAPACITY: CPT

## 2020-09-24 PROCEDURE — 85018 HEMOGLOBIN: CPT | Mod: QW

## 2020-09-24 PROCEDURE — 94010 BREATHING CAPACITY TEST: CPT

## 2020-09-24 PROCEDURE — 94727 GAS DIL/WSHOT DETER LNG VOL: CPT

## 2020-09-24 PROCEDURE — 99214 OFFICE O/P EST MOD 30 MIN: CPT

## 2020-09-24 NOTE — PHYSICAL EXAM
[General Appearance - Alert] : alert [General Appearance - In No Acute Distress] : in no acute distress [Sclera] : the sclera and conjunctiva were normal [PERRL With Normal Accommodation] : pupils were equal in size, round, and reactive to light [Extraocular Movements] : extraocular movements were intact [Neck Appearance] : the appearance of the neck was normal [Neck Cervical Mass (___cm)] : no neck mass was observed [Jugular Venous Distention Increased] : there was no jugular-venous distention [Thyroid Diffuse Enlargement] : the thyroid was not enlarged [Thyroid Nodule] : there were no palpable thyroid nodules [Auscultation Breath Sounds / Voice Sounds] : lungs were clear to auscultation bilaterally [Heart Rate And Rhythm] : heart rate was normal and rhythm regular [Heart Sounds] : normal S1 and S2 [Heart Sounds Gallop] : no gallops [Murmurs] : no murmurs [Heart Sounds Pericardial Friction Rub] : no pericardial rub [Edema] : there was no peripheral edema [Bowel Sounds] : normal bowel sounds [Abdomen Soft] : soft [Abdomen Tenderness] : non-tender [Abdomen Mass (___ Cm)] : no abdominal mass palpated [External Hemorrhoid] : no external hemorrhoids [FreeTextEntry1] : no external anal erythemaor rashes or hemorrhoids or tags [] : no rash [Oriented To Time, Place, And Person] : oriented to person, place, and time [Impaired Insight] : insight and judgment were intact [Affect] : the affect was normal

## 2020-09-24 NOTE — HISTORY OF PRESENT ILLNESS
[de-identified] : patient returns for followup evaluation for ulcerative colitis. She is doing well on mesalamine with no abdominal pain. She has about 4, was per daybut is formed and usually piecesbut no significant bleeding only occasionally when she wipes she self. She did have a colonoscopy by another physician in January. This was unremarkable. She still complains of anal pruritus has not responded to Balmex.he's had no weight loss joint pains rashes or other symptoms.

## 2020-09-24 NOTE — ASSESSMENT
[FreeTextEntry1] : the patient is doing well on advised to continue on the same dose of mesalamine. As for her anal pruritus, since the vomiting is ineffectiveI ordered Analpram-HC to see if this helps ameliorate her symptoms also advised to use Tucks wipes. She will call me a couple weeks to let me how she is doing

## 2020-09-26 ENCOUNTER — RX RENEWAL (OUTPATIENT)
Age: 65
End: 2020-09-26

## 2020-09-29 LAB — SARS-COV-2 N GENE NPH QL NAA+PROBE: NOT DETECTED

## 2020-10-01 ENCOUNTER — RX RENEWAL (OUTPATIENT)
Age: 65
End: 2020-10-01

## 2020-10-26 ENCOUNTER — RX RENEWAL (OUTPATIENT)
Age: 65
End: 2020-10-26

## 2020-11-04 ENCOUNTER — APPOINTMENT (OUTPATIENT)
Dept: RHEUMATOLOGY | Facility: CLINIC | Age: 65
End: 2020-11-04
Payer: MEDICARE

## 2020-11-04 VITALS
RESPIRATION RATE: 17 BRPM | TEMPERATURE: 98.3 F | OXYGEN SATURATION: 98 % | BODY MASS INDEX: 41.41 KG/M2 | HEIGHT: 62 IN | SYSTOLIC BLOOD PRESSURE: 126 MMHG | HEART RATE: 65 BPM | WEIGHT: 225 LBS | DIASTOLIC BLOOD PRESSURE: 66 MMHG

## 2020-11-04 PROCEDURE — 99072 ADDL SUPL MATRL&STAF TM PHE: CPT

## 2020-11-04 PROCEDURE — 99215 OFFICE O/P EST HI 40 MIN: CPT | Mod: 25

## 2020-11-04 PROCEDURE — 99358 PROLONG SERVICE W/O CONTACT: CPT

## 2020-11-04 RX ORDER — HYDROCORTISONE 2.5% 25 MG/G
2.5 CREAM TOPICAL
Qty: 1 | Refills: 3 | Status: DISCONTINUED | COMMUNITY
Start: 2020-09-28 | End: 2020-11-04

## 2020-11-04 RX ORDER — HYDROCORTISONE ACETATE AND PRAMOXINE HYDROCHLORIDE 25; 10 MG/G; MG/G
2.5-1 CREAM TOPICAL
Qty: 1 | Refills: 2 | Status: DISCONTINUED | COMMUNITY
Start: 2020-09-24 | End: 2020-11-04

## 2020-11-04 RX ORDER — HYDROCHLOROTHIAZIDE 12.5 MG/1
12.5 TABLET ORAL DAILY
Refills: 0 | Status: ACTIVE | COMMUNITY

## 2020-11-12 LAB
ALBUMIN SERPL ELPH-MCNC: 4.6 G/DL
ALP BLD-CCNC: 72 U/L
ALT SERPL-CCNC: 25 U/L
ANION GAP SERPL CALC-SCNC: 15 MMOL/L
APPEARANCE: CLEAR
AST SERPL-CCNC: 20 U/L
BACTERIA: NEGATIVE
BASOPHILS # BLD AUTO: 0.07 K/UL
BASOPHILS NFR BLD AUTO: 1.1 %
BILIRUB SERPL-MCNC: 0.4 MG/DL
BILIRUBIN URINE: NEGATIVE
BLOOD URINE: NEGATIVE
BUN SERPL-MCNC: 20 MG/DL
CALCIUM SERPL-MCNC: 9.9 MG/DL
CHLORIDE SERPL-SCNC: 101 MMOL/L
CK SERPL-CCNC: 104 U/L
CO2 SERPL-SCNC: 26 MMOL/L
COLOR: YELLOW
CREAT SERPL-MCNC: 0.68 MG/DL
CRP SERPL-MCNC: 1.24 MG/DL
DEPRECATED KAPPA LC FREE/LAMBDA SER: 1.2 RATIO
EOSINOPHIL # BLD AUTO: 0.16 K/UL
EOSINOPHIL NFR BLD AUTO: 2.5 %
ERYTHROCYTE [SEDIMENTATION RATE] IN BLOOD BY WESTERGREN METHOD: 13 MM/HR
GLUCOSE QUALITATIVE U: NEGATIVE
GLUCOSE SERPL-MCNC: 93 MG/DL
HCT VFR BLD CALC: 42.3 %
HGB BLD-MCNC: 13.7 G/DL
HYALINE CASTS: 2 /LPF
IGA SER QL IEP: 195 MG/DL
IGG SER QL IEP: 859 MG/DL
IGM SER QL IEP: 46 MG/DL
IMM GRANULOCYTES NFR BLD AUTO: 0.3 %
KAPPA LC CSF-MCNC: 0.98 MG/DL
KAPPA LC SERPL-MCNC: 1.18 MG/DL
KETONES URINE: NEGATIVE
LEUKOCYTE ESTERASE URINE: NEGATIVE
LYMPHOCYTES # BLD AUTO: 1.65 K/UL
LYMPHOCYTES NFR BLD AUTO: 25.9 %
M PROTEIN SPEC IFE-MCNC: NORMAL
MAN DIFF?: NORMAL
MCHC RBC-ENTMCNC: 28.2 PG
MCHC RBC-ENTMCNC: 32.4 GM/DL
MCV RBC AUTO: 87 FL
MICROSCOPIC-UA: NORMAL
MONOCYTES # BLD AUTO: 0.61 K/UL
MONOCYTES NFR BLD AUTO: 9.6 %
NEUTROPHILS # BLD AUTO: 3.85 K/UL
NEUTROPHILS NFR BLD AUTO: 60.6 %
NITRITE URINE: NEGATIVE
PH URINE: 6.5
PHOSPHATE SERPL-MCNC: 3.6 MG/DL
PLATELET # BLD AUTO: 320 K/UL
POTASSIUM SERPL-SCNC: 3.9 MMOL/L
PROT SERPL-MCNC: 6.9 G/DL
PROTEIN URINE: NEGATIVE
RBC # BLD: 4.86 M/UL
RBC # FLD: 13 %
RED BLOOD CELLS URINE: 3 /HPF
SODIUM SERPL-SCNC: 142 MMOL/L
SPECIFIC GRAVITY URINE: 1.02
SQUAMOUS EPITHELIAL CELLS: 3 /HPF
TSH SERPL-ACNC: 1.13 UIU/ML
UROBILINOGEN URINE: NORMAL
WBC # FLD AUTO: 6.36 K/UL
WHITE BLOOD CELLS URINE: 2 /HPF

## 2020-12-17 ENCOUNTER — RX RENEWAL (OUTPATIENT)
Age: 65
End: 2020-12-17

## 2021-01-01 ENCOUNTER — RX RENEWAL (OUTPATIENT)
Age: 66
End: 2021-01-01

## 2021-03-02 ENCOUNTER — RX RENEWAL (OUTPATIENT)
Age: 66
End: 2021-03-02

## 2021-03-14 ENCOUNTER — NON-APPOINTMENT (OUTPATIENT)
Age: 66
End: 2021-03-14

## 2021-03-15 ENCOUNTER — RX RENEWAL (OUTPATIENT)
Age: 66
End: 2021-03-15

## 2021-03-16 ENCOUNTER — RX RENEWAL (OUTPATIENT)
Age: 66
End: 2021-03-16

## 2021-03-17 ENCOUNTER — RX RENEWAL (OUTPATIENT)
Age: 66
End: 2021-03-17

## 2021-04-21 ENCOUNTER — RX CHANGE (OUTPATIENT)
Age: 66
End: 2021-04-21

## 2021-04-21 ENCOUNTER — APPOINTMENT (OUTPATIENT)
Dept: GASTROENTEROLOGY | Facility: CLINIC | Age: 66
End: 2021-04-21
Payer: MEDICARE

## 2021-04-21 VITALS
BODY MASS INDEX: 41.41 KG/M2 | RESPIRATION RATE: 14 BRPM | HEIGHT: 62 IN | HEART RATE: 71 BPM | TEMPERATURE: 97.9 F | DIASTOLIC BLOOD PRESSURE: 90 MMHG | SYSTOLIC BLOOD PRESSURE: 144 MMHG | WEIGHT: 225 LBS | OXYGEN SATURATION: 98 %

## 2021-04-21 DIAGNOSIS — E66.01 MORBID (SEVERE) OBESITY DUE TO EXCESS CALORIES: ICD-10-CM

## 2021-04-21 PROCEDURE — 99072 ADDL SUPL MATRL&STAF TM PHE: CPT

## 2021-04-21 PROCEDURE — 99214 OFFICE O/P EST MOD 30 MIN: CPT

## 2021-04-21 NOTE — ASSESSMENT
[FreeTextEntry1] : I discussed with the patient that a lot of her symptoms could be due to urgency related to distal disease.  The patient is due for routine colonoscopy as well as it would be appropriate to evaluate why she is having symptoms despite treatment.  The indication benefits risks alternatives to colonoscopy were discussed with the patient and she is medically optimal for the planned procedure.  She will obtain routine blood work as well as CRP and we will consider changing her treatment regiment depending upon the outcome of the colonoscopy.\par \par In addition we discussed her weight.  She is complaining of some inability take it deep breath then but had a negative pulmonology evaluation and this most like is related to her weight and exercise tolerance.  We discussed weight loss strategies including carbohydrate restriction portion control and exercise.

## 2021-04-21 NOTE — HISTORY OF PRESENT ILLNESS
[de-identified] : Patient with longstanding history of ulcerative colitis.  She has been having issues with urgency with small bowel movements sometimes formed sometimes loose occasionally with blood sometimes related to meals.  She has no real abdominal pain or fever chills or extraintestinal manifestations of her disease.\par \par In addition patient complains of inability to take a deep breath but had a negative pulmonology evaluation.  She has gained weight since I last saw her and her BMI is now 40.  She does not smoke cigarettes.

## 2021-05-14 ENCOUNTER — APPOINTMENT (OUTPATIENT)
Dept: RHEUMATOLOGY | Facility: CLINIC | Age: 66
End: 2021-05-14
Payer: MEDICARE

## 2021-05-14 VITALS
TEMPERATURE: 98 F | WEIGHT: 225 LBS | RESPIRATION RATE: 17 BRPM | OXYGEN SATURATION: 97 % | BODY MASS INDEX: 41.41 KG/M2 | HEART RATE: 72 BPM | HEIGHT: 62 IN | DIASTOLIC BLOOD PRESSURE: 76 MMHG | SYSTOLIC BLOOD PRESSURE: 114 MMHG

## 2021-05-14 PROCEDURE — G2212 PROLONG OUTPT/OFFICE VIS: CPT

## 2021-05-14 PROCEDURE — 99215 OFFICE O/P EST HI 40 MIN: CPT | Mod: 25

## 2021-05-14 PROCEDURE — 99072 ADDL SUPL MATRL&STAF TM PHE: CPT

## 2021-05-14 RX ORDER — ALENDRONATE SODIUM 70 MG/1
70 TABLET ORAL
Qty: 12 | Refills: 0 | Status: DISCONTINUED | COMMUNITY
Start: 2020-05-09 | End: 2021-05-14

## 2021-05-17 RX ORDER — SULINDAC 200 MG/1
200 TABLET ORAL
Qty: 180 | Refills: 0 | Status: DISCONTINUED | COMMUNITY
Start: 2020-09-26 | End: 2021-05-17

## 2021-05-18 ENCOUNTER — APPOINTMENT (OUTPATIENT)
Dept: GASTROENTEROLOGY | Facility: CLINIC | Age: 66
End: 2021-05-18
Payer: MEDICARE

## 2021-05-18 VITALS
SYSTOLIC BLOOD PRESSURE: 129 MMHG | DIASTOLIC BLOOD PRESSURE: 82 MMHG | BODY MASS INDEX: 41.41 KG/M2 | HEART RATE: 74 BPM | HEIGHT: 62 IN | TEMPERATURE: 98.1 F | WEIGHT: 225 LBS

## 2021-05-18 PROCEDURE — 99072 ADDL SUPL MATRL&STAF TM PHE: CPT

## 2021-05-18 PROCEDURE — 99214 OFFICE O/P EST MOD 30 MIN: CPT

## 2021-05-18 RX ORDER — SODIUM SULFATE, POTASSIUM SULFATE, MAGNESIUM SULFATE 17.5; 3.13; 1.6 G/ML; G/ML; G/ML
17.5-3.13-1.6 SOLUTION, CONCENTRATE ORAL
Qty: 1 | Refills: 0 | Status: COMPLETED | COMMUNITY
Start: 2021-04-24 | End: 2021-05-18

## 2021-05-18 RX ORDER — SODIUM PICOSULFATE, MAGNESIUM OXIDE, AND ANHYDROUS CITRIC ACID 10; 3.5; 12 MG/160ML; G/160ML; G/160ML
10-3.5-12 MG-GM LIQUID ORAL
Qty: 1 | Refills: 0 | Status: COMPLETED | COMMUNITY
Start: 2021-04-21 | End: 2021-05-18

## 2021-05-18 RX ORDER — ACETAMINOPHEN 500 MG/1
500 TABLET, COATED ORAL
Qty: 100 | Refills: 0 | Status: COMPLETED | COMMUNITY
Start: 2021-05-17 | End: 2021-05-18

## 2021-05-18 RX ORDER — ALBUTEROL SULFATE 90 UG/1
108 (90 BASE) INHALANT RESPIRATORY (INHALATION)
Qty: 1 | Refills: 2 | Status: COMPLETED | COMMUNITY
Start: 2020-09-04 | End: 2021-05-18

## 2021-05-18 RX ORDER — MESALAMINE 1.2 G/1
1.2 TABLET, DELAYED RELEASE ORAL
Qty: 180 | Refills: 1 | Status: COMPLETED | COMMUNITY
Start: 2020-03-12 | End: 2021-05-18

## 2021-05-18 NOTE — HISTORY OF PRESENT ILLNESS
[de-identified] : Patient with the last couple days feeling weak and tired and also complaining of multiple bowel movements.  The bowel movements are very small in volume and very often formed but with just with urgency to go to the bathroom.  She has had similar issues in the past.  She is currently taking mesalamine 800 mg 3 times daily.  She had blood work done today which is not available yet for my review.

## 2021-05-18 NOTE — ASSESSMENT
[FreeTextEntry1] : I discussed with the patient that I felt her symptoms were related to distal disease and urgency and tenesmus and recommended that she use Rowasa enemas once a night in addition to her normal dose of mesalamine.  I also asked her to call me in a week and let me know how she was doing as well as to review the results of her blood work.  The patient scheduled to have a colonoscopy on June 1.

## 2021-05-19 LAB
ALBUMIN SERPL ELPH-MCNC: 4.6 G/DL
ALP BLD-CCNC: 71 U/L
ALT SERPL-CCNC: 21 U/L
ANION GAP SERPL CALC-SCNC: 13 MMOL/L
AST SERPL-CCNC: 16 U/L
BASOPHILS # BLD AUTO: 0.06 K/UL
BASOPHILS NFR BLD AUTO: 1 %
BILIRUB SERPL-MCNC: 0.4 MG/DL
BUN SERPL-MCNC: 19 MG/DL
CALCIUM SERPL-MCNC: 10.1 MG/DL
CHLORIDE SERPL-SCNC: 101 MMOL/L
CO2 SERPL-SCNC: 25 MMOL/L
CREAT SERPL-MCNC: 0.64 MG/DL
CRP SERPL-MCNC: 12 MG/L
EOSINOPHIL # BLD AUTO: 0.16 K/UL
EOSINOPHIL NFR BLD AUTO: 2.6 %
GLUCOSE SERPL-MCNC: 127 MG/DL
HCT VFR BLD CALC: 43.2 %
HGB BLD-MCNC: 13.8 G/DL
IMM GRANULOCYTES NFR BLD AUTO: 0.8 %
LYMPHOCYTES # BLD AUTO: 1.5 K/UL
LYMPHOCYTES NFR BLD AUTO: 24.2 %
MAN DIFF?: NORMAL
MCHC RBC-ENTMCNC: 27.9 PG
MCHC RBC-ENTMCNC: 31.9 GM/DL
MCV RBC AUTO: 87.3 FL
MONOCYTES # BLD AUTO: 0.51 K/UL
MONOCYTES NFR BLD AUTO: 8.2 %
NEUTROPHILS # BLD AUTO: 3.93 K/UL
NEUTROPHILS NFR BLD AUTO: 63.2 %
PLATELET # BLD AUTO: 320 K/UL
POTASSIUM SERPL-SCNC: 3.8 MMOL/L
PROT SERPL-MCNC: 6.6 G/DL
RBC # BLD: 4.95 M/UL
RBC # FLD: 13.3 %
SODIUM SERPL-SCNC: 138 MMOL/L
WBC # FLD AUTO: 6.21 K/UL

## 2021-05-25 LAB
25(OH)D3 SERPL-MCNC: 48 NG/ML
ALBUMIN SERPL ELPH-MCNC: 4.5 G/DL
ALP BLD-CCNC: 70 U/L
ALT SERPL-CCNC: 24 U/L
ANION GAP SERPL CALC-SCNC: 12 MMOL/L
AST SERPL-CCNC: 19 U/L
BASOPHILS # BLD AUTO: 0.04 K/UL
BASOPHILS NFR BLD AUTO: 0.6 %
BILIRUB SERPL-MCNC: 0.4 MG/DL
BUN SERPL-MCNC: 20 MG/DL
CALCIUM SERPL-MCNC: 10.3 MG/DL
CALCIUM SERPL-MCNC: 10.3 MG/DL
CHLORIDE SERPL-SCNC: 101 MMOL/L
CK SERPL-CCNC: 121 U/L
CO2 SERPL-SCNC: 26 MMOL/L
CREAT SERPL-MCNC: 0.63 MG/DL
CRP SERPL-MCNC: 12 MG/L
DEPRECATED KAPPA LC FREE/LAMBDA SER: 1.36 RATIO
ENDOMYSIUM IGA SER QL: NEGATIVE
ENDOMYSIUM IGA TITR SER: NORMAL
EOSINOPHIL # BLD AUTO: 0.13 K/UL
EOSINOPHIL NFR BLD AUTO: 2.1 %
ERYTHROCYTE [SEDIMENTATION RATE] IN BLOOD BY WESTERGREN METHOD: 13 MM/HR
GLIADIN IGA SER QL: 6 UNITS
GLIADIN IGG SER QL: <5 UNITS
GLIADIN PEPTIDE IGA SER-ACNC: NEGATIVE
GLIADIN PEPTIDE IGG SER-ACNC: NEGATIVE
GLUCOSE SERPL-MCNC: 125 MG/DL
HCT VFR BLD CALC: 42.6 %
HGB BLD-MCNC: 13.7 G/DL
IGA SER QL IEP: 186 MG/DL
IGG SER QL IEP: 882 MG/DL
IGM SER QL IEP: 46 MG/DL
IMM GRANULOCYTES NFR BLD AUTO: 0.5 %
KAPPA LC CSF-MCNC: 0.88 MG/DL
KAPPA LC SERPL-MCNC: 1.2 MG/DL
LYMPHOCYTES # BLD AUTO: 1.49 K/UL
LYMPHOCYTES NFR BLD AUTO: 24.1 %
M PROTEIN SPEC IFE-MCNC: NORMAL
MAGNESIUM SERPL-MCNC: 1.9 MG/DL
MAN DIFF?: NORMAL
MCHC RBC-ENTMCNC: 28.6 PG
MCHC RBC-ENTMCNC: 32.2 GM/DL
MCV RBC AUTO: 88.9 FL
MONOCYTES # BLD AUTO: 0.55 K/UL
MONOCYTES NFR BLD AUTO: 8.9 %
NEUTROPHILS # BLD AUTO: 3.93 K/UL
NEUTROPHILS NFR BLD AUTO: 63.8 %
PARATHYROID HORMONE INTACT: 26 PG/ML
PHOSPHATE SERPL-MCNC: 3 MG/DL
PLATELET # BLD AUTO: 307 K/UL
POTASSIUM SERPL-SCNC: 3.8 MMOL/L
PROT SERPL-MCNC: 6.8 G/DL
RBC # BLD: 4.79 M/UL
RBC # FLD: 13.2 %
SODIUM SERPL-SCNC: 139 MMOL/L
TSH SERPL-ACNC: 1.14 UIU/ML
TTG IGA SER IA-ACNC: <1.2 U/ML
TTG IGA SER-ACNC: NEGATIVE
WBC # FLD AUTO: 6.17 K/UL

## 2021-05-28 DIAGNOSIS — Z01.818 ENCOUNTER FOR OTHER PREPROCEDURAL EXAMINATION: ICD-10-CM

## 2021-05-29 ENCOUNTER — APPOINTMENT (OUTPATIENT)
Dept: DISASTER EMERGENCY | Facility: CLINIC | Age: 66
End: 2021-05-29

## 2021-05-30 LAB — SARS-COV-2 N GENE NPH QL NAA+PROBE: NOT DETECTED

## 2021-06-01 ENCOUNTER — APPOINTMENT (OUTPATIENT)
Dept: GASTROENTEROLOGY | Facility: GI CENTER | Age: 66
End: 2021-06-01
Payer: MEDICARE

## 2021-06-01 ENCOUNTER — RESULT REVIEW (OUTPATIENT)
Age: 66
End: 2021-06-01

## 2021-06-01 ENCOUNTER — OUTPATIENT (OUTPATIENT)
Dept: OUTPATIENT SERVICES | Facility: HOSPITAL | Age: 66
LOS: 1 days | End: 2021-06-01
Payer: MEDICARE

## 2021-06-01 DIAGNOSIS — N60.02 SOLITARY CYST OF LEFT BREAST: Chronic | ICD-10-CM

## 2021-06-01 DIAGNOSIS — Z98.890 OTHER SPECIFIED POSTPROCEDURAL STATES: Chronic | ICD-10-CM

## 2021-06-01 DIAGNOSIS — K51.90 ULCERATIVE COLITIS, UNSPECIFIED, WITHOUT COMPLICATIONS: ICD-10-CM

## 2021-06-01 PROCEDURE — 45380 COLONOSCOPY AND BIOPSY: CPT

## 2021-06-01 PROCEDURE — 88305 TISSUE EXAM BY PATHOLOGIST: CPT | Mod: 26

## 2021-06-01 PROCEDURE — 88305 TISSUE EXAM BY PATHOLOGIST: CPT

## 2021-06-01 NOTE — PROCEDURE
[With Biopsy] : with biopsy [Colitis] : colitis [Procedure Explained] : The procedure was explained [Allergies Reviewed] : allergies reviewed. [Risks] : Risks [Benefits] : benefits [Alternatives] : alternatives [Bleeding] : bleeding risk [Consent Obtained] : written consent was obtained prior to the procedure and is detailed in the patient's record [Patient] : the patient [Automated Blood Pressure Cuff] : automated blood pressure cuff [Cardiac Monitor] : cardiac monitor [Pulse Oximeter] : pulse oximeter [Propofol ___ mg IV] : Propofol [unfilled] ~Umg intravenously [___ L/min Oxygen via NC] : [unfilled] ~Uliters/minute oxygen via nasal cannula [Prep Qualtiy: ___] : Prep Quality:  [unfilled] [Withdrawal Time: ___] : Withdrawal Time:  [unfilled] [Cecum (Landmarks/Transillum)] : and guided to the cecum which was identified by the anatomic landmarks of the appendiceal orifice and ileocecal valve and by transillumination in the right lower quadrant [Normal] : Normal [Proctitis (diffuse)] : Proctitis (diffuse) [Biopsy] : biopsy [Sent to Pathology] : was sent to pathology for analysis [Tolerated Well] : the patient tolerated the procedure well [de-identified] : Multiple biopsies of all segments were taken to rule out dysplasia

## 2021-06-02 ENCOUNTER — RX RENEWAL (OUTPATIENT)
Age: 66
End: 2021-06-02

## 2021-06-03 LAB — SURGICAL PATHOLOGY STUDY: SIGNIFICANT CHANGE UP

## 2021-06-25 ENCOUNTER — NON-APPOINTMENT (OUTPATIENT)
Age: 66
End: 2021-06-25

## 2021-06-29 ENCOUNTER — NON-APPOINTMENT (OUTPATIENT)
Age: 66
End: 2021-06-29

## 2021-07-08 ENCOUNTER — LABORATORY RESULT (OUTPATIENT)
Age: 66
End: 2021-07-08

## 2021-07-28 ENCOUNTER — APPOINTMENT (OUTPATIENT)
Dept: RHEUMATOLOGY | Facility: CLINIC | Age: 66
End: 2021-07-28
Payer: MEDICARE

## 2021-07-28 VITALS
TEMPERATURE: 98.7 F | OXYGEN SATURATION: 95 % | RESPIRATION RATE: 17 BRPM | SYSTOLIC BLOOD PRESSURE: 117 MMHG | DIASTOLIC BLOOD PRESSURE: 71 MMHG | HEART RATE: 71 BPM

## 2021-07-28 VITALS
OXYGEN SATURATION: 97 % | DIASTOLIC BLOOD PRESSURE: 69 MMHG | RESPIRATION RATE: 17 BRPM | HEART RATE: 68 BPM | SYSTOLIC BLOOD PRESSURE: 102 MMHG

## 2021-07-28 PROCEDURE — 96374 THER/PROPH/DIAG INJ IV PUSH: CPT

## 2021-07-28 PROCEDURE — 99072 ADDL SUPL MATRL&STAF TM PHE: CPT

## 2021-07-28 RX ORDER — ZOLEDRONIC ACID 5 MG/100ML
5 INJECTION INTRAVENOUS
Qty: 0 | Refills: 0 | Status: COMPLETED | OUTPATIENT
Start: 2021-07-20

## 2021-09-27 ENCOUNTER — APPOINTMENT (OUTPATIENT)
Dept: RHEUMATOLOGY | Facility: CLINIC | Age: 66
End: 2021-09-27
Payer: MEDICARE

## 2021-09-27 VITALS
RESPIRATION RATE: 17 BRPM | HEIGHT: 62 IN | SYSTOLIC BLOOD PRESSURE: 120 MMHG | DIASTOLIC BLOOD PRESSURE: 76 MMHG | OXYGEN SATURATION: 97 % | BODY MASS INDEX: 41.41 KG/M2 | WEIGHT: 225 LBS | TEMPERATURE: 98 F | HEART RATE: 76 BPM

## 2021-09-27 LAB
BILIRUB UR QL STRIP: NEGATIVE
CLARITY UR: CLEAR
COLLECTION METHOD: NORMAL
GLUCOSE UR-MCNC: NEGATIVE
HCG UR QL: 0.2 EU/DL
HGB UR QL STRIP.AUTO: NORMAL
KETONES UR-MCNC: NEGATIVE
LEUKOCYTE ESTERASE UR QL STRIP: NEGATIVE
NITRITE UR QL STRIP: NEGATIVE
PH UR STRIP: 5.5
PROT UR STRIP-MCNC: NEGATIVE
SP GR UR STRIP: 1.01

## 2021-09-27 PROCEDURE — 81003 URINALYSIS AUTO W/O SCOPE: CPT | Mod: QW

## 2021-09-27 PROCEDURE — 99215 OFFICE O/P EST HI 40 MIN: CPT | Mod: 25

## 2021-09-27 PROCEDURE — 36415 COLL VENOUS BLD VENIPUNCTURE: CPT

## 2021-09-28 LAB
ALBUMIN SERPL ELPH-MCNC: 4.7 G/DL
ALP BLD-CCNC: 78 U/L
ALT SERPL-CCNC: 19 U/L
ANION GAP SERPL CALC-SCNC: 14 MMOL/L
AST SERPL-CCNC: 16 U/L
BASOPHILS # BLD AUTO: 0.06 K/UL
BASOPHILS NFR BLD AUTO: 0.9 %
BILIRUB SERPL-MCNC: 0.4 MG/DL
BUN SERPL-MCNC: 18 MG/DL
CALCIUM SERPL-MCNC: 10.2 MG/DL
CHLORIDE SERPL-SCNC: 101 MMOL/L
CK SERPL-CCNC: 154 U/L
CO2 SERPL-SCNC: 27 MMOL/L
CREAT SERPL-MCNC: 0.68 MG/DL
CRP SERPL-MCNC: 17 MG/L
EOSINOPHIL # BLD AUTO: 0.15 K/UL
EOSINOPHIL NFR BLD AUTO: 2.2 %
ERYTHROCYTE [SEDIMENTATION RATE] IN BLOOD BY WESTERGREN METHOD: 27 MM/HR
GLUCOSE SERPL-MCNC: 98 MG/DL
HCT VFR BLD CALC: 43.7 %
HGB BLD-MCNC: 14 G/DL
IMM GRANULOCYTES NFR BLD AUTO: 0.9 %
LYMPHOCYTES # BLD AUTO: 1.85 K/UL
LYMPHOCYTES NFR BLD AUTO: 27.2 %
MAN DIFF?: NORMAL
MCHC RBC-ENTMCNC: 28.7 PG
MCHC RBC-ENTMCNC: 32 GM/DL
MCV RBC AUTO: 89.7 FL
MONOCYTES # BLD AUTO: 0.72 K/UL
MONOCYTES NFR BLD AUTO: 10.6 %
NEUTROPHILS # BLD AUTO: 3.97 K/UL
NEUTROPHILS NFR BLD AUTO: 58.2 %
PHOSPHATE SERPL-MCNC: 3.2 MG/DL
PLATELET # BLD AUTO: 339 K/UL
POTASSIUM SERPL-SCNC: 3.9 MMOL/L
PROT SERPL-MCNC: 7.3 G/DL
RBC # BLD: 4.87 M/UL
RBC # FLD: 13.2 %
SODIUM SERPL-SCNC: 142 MMOL/L
WBC # FLD AUTO: 6.81 K/UL

## 2021-10-06 ENCOUNTER — NON-APPOINTMENT (OUTPATIENT)
Age: 66
End: 2021-10-06

## 2021-10-19 ENCOUNTER — APPOINTMENT (OUTPATIENT)
Dept: GASTROENTEROLOGY | Facility: CLINIC | Age: 66
End: 2021-10-19
Payer: MEDICARE

## 2021-10-19 VITALS
TEMPERATURE: 98.7 F | SYSTOLIC BLOOD PRESSURE: 128 MMHG | HEART RATE: 98 BPM | HEIGHT: 62 IN | DIASTOLIC BLOOD PRESSURE: 76 MMHG | BODY MASS INDEX: 40.12 KG/M2 | WEIGHT: 218 LBS | OXYGEN SATURATION: 69 % | RESPIRATION RATE: 14 BRPM

## 2021-10-19 PROCEDURE — 99214 OFFICE O/P EST MOD 30 MIN: CPT

## 2021-10-19 NOTE — ASSESSMENT
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  Today is the best day she has had in quite some time and possibly this is related to the fact that she just started on the budesonide and today is the sixth day.  I advised her to continue on the same therapy with the Lialda twice a day as well as the budesonide enemas to see if this improvement continues and we would stay the course.  I discussed with her that if she has recurrent symptoms we would need to consider other medications such as oral budesonide or systemic steroids.  She will call me in a week to let me know how she is doing.

## 2021-10-19 NOTE — HISTORY OF PRESENT ILLNESS
[de-identified] : Patient several months ago had a colonoscopy for her chronic ulcerative colitis which demonstrated proctitis.  She started having increasing symptoms and initially Lialda was increased to twice a day and then Rowasa enemas were added as well.  The last couple months she says she is having symptoms with up to 10 bowel movements per day small in quantity but increased frequency and tenesmus.  There is occasional blood but no abdominal pain nausea vomiting weight loss fever chills rectal intestinal manifestations of her disease.  The patient was started on budesonide enemas about 6 days ago and today she has had no bowel movements for the first time and actually feels better.  She also says she feels tired and weak but blood work done a couple weeks ago by her rheumatologist showed a normal CBC and CMP.

## 2021-10-22 ENCOUNTER — RESULT REVIEW (OUTPATIENT)
Age: 66
End: 2021-10-22

## 2021-10-30 ENCOUNTER — APPOINTMENT (OUTPATIENT)
Dept: CT IMAGING | Facility: CLINIC | Age: 66
End: 2021-10-30
Payer: MEDICARE

## 2021-10-30 ENCOUNTER — OUTPATIENT (OUTPATIENT)
Dept: OUTPATIENT SERVICES | Facility: HOSPITAL | Age: 66
LOS: 1 days | End: 2021-10-30
Payer: MEDICARE

## 2021-10-30 DIAGNOSIS — K51.90 ULCERATIVE COLITIS, UNSPECIFIED, WITHOUT COMPLICATIONS: ICD-10-CM

## 2021-10-30 DIAGNOSIS — Z98.890 OTHER SPECIFIED POSTPROCEDURAL STATES: Chronic | ICD-10-CM

## 2021-10-30 DIAGNOSIS — N60.02 SOLITARY CYST OF LEFT BREAST: Chronic | ICD-10-CM

## 2021-10-30 PROCEDURE — 74177 CT ABD & PELVIS W/CONTRAST: CPT

## 2021-10-30 PROCEDURE — 74177 CT ABD & PELVIS W/CONTRAST: CPT | Mod: 26

## 2021-10-30 PROCEDURE — 82565 ASSAY OF CREATININE: CPT

## 2021-11-02 LAB
C DIFF TOX GENS STL QL NAA+PROBE: NORMAL
CDIFF BY PCR: NOT DETECTED

## 2021-11-03 LAB — GI PCR PANEL, STOOL: NORMAL

## 2021-11-10 ENCOUNTER — NON-APPOINTMENT (OUTPATIENT)
Age: 66
End: 2021-11-10

## 2021-11-12 ENCOUNTER — TRANSCRIPTION ENCOUNTER (OUTPATIENT)
Age: 66
End: 2021-11-12

## 2021-11-13 ENCOUNTER — APPOINTMENT (OUTPATIENT)
Dept: DISASTER EMERGENCY | Facility: HOSPITAL | Age: 66
End: 2021-11-13

## 2021-11-22 ENCOUNTER — NON-APPOINTMENT (OUTPATIENT)
Age: 66
End: 2021-11-22

## 2021-11-24 ENCOUNTER — APPOINTMENT (OUTPATIENT)
Dept: GASTROENTEROLOGY | Facility: CLINIC | Age: 66
End: 2021-11-24
Payer: MEDICARE

## 2021-11-24 VITALS
OXYGEN SATURATION: 98 % | RESPIRATION RATE: 14 BRPM | TEMPERATURE: 98.6 F | WEIGHT: 218 LBS | SYSTOLIC BLOOD PRESSURE: 124 MMHG | BODY MASS INDEX: 40.12 KG/M2 | HEART RATE: 72 BPM | HEIGHT: 62 IN | DIASTOLIC BLOOD PRESSURE: 78 MMHG

## 2021-11-24 DIAGNOSIS — Z78.9 OTHER SPECIFIED HEALTH STATUS: ICD-10-CM

## 2021-11-24 PROCEDURE — 99214 OFFICE O/P EST MOD 30 MIN: CPT

## 2021-11-24 RX ORDER — MESALAMINE 800 MG/1
800 TABLET, DELAYED RELEASE ORAL
Qty: 90 | Refills: 2 | Status: DISCONTINUED | COMMUNITY
Start: 2020-03-14 | End: 2021-11-24

## 2021-11-24 RX ORDER — MESALAMINE 4 G/60ML
4 SUSPENSION RECTAL
Qty: 28 | Refills: 0 | Status: DISCONTINUED | COMMUNITY
Start: 2021-10-11 | End: 2021-11-24

## 2021-11-24 RX ORDER — MESALAMINE 4 G/60 ML
4 KIT RECTAL
Qty: 30 | Refills: 1 | Status: DISCONTINUED | COMMUNITY
Start: 2021-05-18 | End: 2021-11-24

## 2021-11-24 RX ORDER — MESALAMINE 1.2 G/1
1.2 TABLET, DELAYED RELEASE ORAL DAILY
Qty: 180 | Refills: 0 | Status: DISCONTINUED | COMMUNITY
Start: 2021-06-03 | End: 2021-11-24

## 2021-11-24 RX ORDER — BUDESONIDE 28 MG/1
2 AEROSOL, FOAM RECTAL TWICE DAILY
Qty: 60 | Refills: 1 | Status: DISCONTINUED | COMMUNITY
Start: 2021-10-07 | End: 2021-11-24

## 2021-11-24 NOTE — HISTORY OF PRESENT ILLNESS
[de-identified] : Patient presents for follow-up for persistent symptoms from her inflammatory bowel disease.  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  Patient's been having symptoms up to 6 bowel movements per day usually small usually always now with blood and is getting worse.  Recently she also developed epigastric pain.  She had been treated with enemas and 5-ASA most recently budesonide but still having symptoms.  She was going to start on prednisone but she developed Covid 2 weeks ago.  She still states that her symptoms predated her developing Covid.  Patient does take sulindac twice daily.  She is not taking PPI.  She has no nausea vomiting or heartburn.  She has no weight loss.  She has no fever or chills.

## 2021-11-24 NOTE — ASSESSMENT
[FreeTextEntry1] : I discussed the patient that she is still having symptoms despite treatment for her proctitis.  I felt at this point it is 2 weeks since her infection with Covid and she is asymptomatic from that and would be appropriate to try to treat her with prednisone to see if this improves her symptoms.  I also put her on omeprazole empirically for epigastric pain and felt she should have an endoscopy as well.  Especially with her constitutional symptoms which actually predated the Covid development.  I also recommended she stop the sulindac at this may be contributing to her symptoms both the epigastric pain and NSAIDs have also been shown to exacerbate inflammatory bowel disease.  The indication benefits risks alternatives procedure were discussed with the patient and she is medically optimal for the planned procedure.  She will also get routine blood work including CBC and CMP.

## 2021-11-24 NOTE — REASON FOR VISIT
[Follow-Up: _____] : a [unfilled] follow-up visit [FreeTextEntry1] : Ulcerative colitis, weakness, epigastric pain

## 2021-11-30 ENCOUNTER — APPOINTMENT (OUTPATIENT)
Dept: RHEUMATOLOGY | Facility: CLINIC | Age: 66
End: 2021-11-30
Payer: MEDICARE

## 2021-11-30 DIAGNOSIS — M95.3 ACQUIRED DEFORMITY OF NECK: ICD-10-CM

## 2021-11-30 DIAGNOSIS — Z79.1 LONG TERM (CURRENT) USE OF NON-STEROIDAL ANTI-INFLAMMATORIES (NSAID): ICD-10-CM

## 2021-11-30 PROCEDURE — 99214 OFFICE O/P EST MOD 30 MIN: CPT | Mod: 95

## 2021-12-05 PROBLEM — Z79.1 ENCOUNTER FOR LONG-TERM (CURRENT) USE OF NSAIDS: Status: ACTIVE | Noted: 2020-06-21

## 2021-12-05 PROBLEM — M95.3 NECK DEFORMITY, ACQUIRED: Status: ACTIVE | Noted: 2021-12-05

## 2021-12-05 RX ORDER — SULINDAC 200 MG/1
200 TABLET ORAL
Qty: 180 | Refills: 0 | Status: DISCONTINUED | COMMUNITY
End: 2021-12-05

## 2021-12-05 NOTE — ADDENDUM
[FreeTextEntry1] : I, Amirah Mclaughlin, acted solely as a scribe for Dr. Myron I. Kleiner, MD. on 11/30/2021 .\par \par All medical record entries made by the Scribe were at Dr. Myron I. Kleiner, MD, direction and personally dictated by me on 11/30/2021. I have personally reviewed the chart and agree that the record accurately reflects my personal performance of the history, physical exam, assessment and plan.

## 2021-12-05 NOTE — PHYSICAL EXAM
[General Appearance - Alert] : alert [General Appearance - In No Acute Distress] : in no acute distress [General Appearance - Well Nourished] : well nourished [General Appearance - Well Developed] : well developed [General Appearance - Well-Appearing] : healthy appearing [Neck Appearance] : the appearance of the neck was normal [] : no respiratory distress [Motor Exam] : the motor exam was normal [No Focal Deficits] : no focal deficits [Oriented To Time, Place, And Person] : oriented to person, place, and time [Impaired Insight] : insight and judgment were intact [Affect] : the affect was normal [Mood] : the mood was normal [FreeTextEntry1] : Shoulders/Elbows/Wrists--Normal\par Hands-Early Jojo's/Heberden's nodes; Flexor tenosynovitis right fourth finger with decreased flexion

## 2021-12-05 NOTE — HISTORY OF PRESENT ILLNESS
[Home] : at home, [unfilled] , at the time of the visit. [Medical Office: ([facility])___] : at the medical office located in  [Verbal consent obtained from patient] : the patient, [unfilled] [FreeTextEntry1] : 66 year-old woman for follow up office visit regarding her joint symptoms and rheumatic diseases, including osteoarthritis, osteopenia, fibromyalgia, chronic low back pain.\par \par Patient is feeling fairly well.  She has occasional pain in low back without radiation. Denies any other recent joint pain.  6 days ago, her GI physician instructed her to discontinue the sulindac secondary to persistent abdominal symptoms/bloody diarrhea with improvement.  Denies sleep disturbance and fatigue. The patient continues calcium, vitamin D supplements and annual IV Zoledronate. Patient denies rash or side effects with other current medications. Patient is content with other current medication regimen.  She did not perform the x-rays which I ordered last visit.\par \par PMH:\par Ulcerative colitis - bloody/mucus stools and Diarrhea - followed up GI who recommended discontinuing Sulindac  \par Endoscopy - scheduled January 2022

## 2021-12-05 NOTE — CONSULT LETTER
[Dear  ___] : Dear  [unfilled], [Consult Letter:] : I had the pleasure of evaluating your patient, [unfilled]. [Please see my note below.] : Please see my note below. [Consult Closing:] : Thank you very much for allowing me to participate in the care of this patient.  If you have any questions, please do not hesitate to contact me. [Sincerely,] : Sincerely, [DrDaniele  ___] : Dr. ORTIZ [FreeTextEntry3] : Ramesh\par Myron I. Kleiner, M.D., FACR \par Chief, Division of Rheumatology\par Department of Medicine \par [facility]

## 2022-01-13 ENCOUNTER — NON-APPOINTMENT (OUTPATIENT)
Age: 67
End: 2022-01-13

## 2022-01-25 ENCOUNTER — APPOINTMENT (OUTPATIENT)
Dept: GASTROENTEROLOGY | Facility: GI CENTER | Age: 67
End: 2022-01-25

## 2022-02-05 ENCOUNTER — NON-APPOINTMENT (OUTPATIENT)
Age: 67
End: 2022-02-05

## 2022-03-17 ENCOUNTER — RX RENEWAL (OUTPATIENT)
Age: 67
End: 2022-03-17

## 2022-04-19 ENCOUNTER — RX RENEWAL (OUTPATIENT)
Age: 67
End: 2022-04-19

## 2022-04-21 ENCOUNTER — APPOINTMENT (OUTPATIENT)
Dept: RHEUMATOLOGY | Facility: CLINIC | Age: 67
End: 2022-04-21
Payer: MEDICARE

## 2022-04-21 VITALS
TEMPERATURE: 98 F | OXYGEN SATURATION: 97 % | HEART RATE: 67 BPM | BODY MASS INDEX: 39.93 KG/M2 | HEIGHT: 62 IN | DIASTOLIC BLOOD PRESSURE: 70 MMHG | SYSTOLIC BLOOD PRESSURE: 120 MMHG | RESPIRATION RATE: 17 BRPM | WEIGHT: 217 LBS

## 2022-04-21 DIAGNOSIS — M65.9 SYNOVITIS AND TENOSYNOVITIS, UNSPECIFIED: ICD-10-CM

## 2022-04-21 DIAGNOSIS — M25.512 PAIN IN LEFT SHOULDER: ICD-10-CM

## 2022-04-21 DIAGNOSIS — G89.29 PAIN IN LEFT SHOULDER: ICD-10-CM

## 2022-04-21 PROCEDURE — 99215 OFFICE O/P EST HI 40 MIN: CPT | Mod: 25

## 2022-04-21 PROCEDURE — G2212 PROLONG OUTPT/OFFICE VIS: CPT

## 2022-04-21 PROCEDURE — 36415 COLL VENOUS BLD VENIPUNCTURE: CPT

## 2022-04-21 PROCEDURE — 81003 URINALYSIS AUTO W/O SCOPE: CPT | Mod: QW

## 2022-04-22 ENCOUNTER — RX RENEWAL (OUTPATIENT)
Age: 67
End: 2022-04-22

## 2022-05-01 LAB
ACE BLD-CCNC: 25 U/L
ALBUMIN SERPL ELPH-MCNC: 4.5 G/DL
ALP BLD-CCNC: 66 U/L
ALT SERPL-CCNC: 19 U/L
ANION GAP SERPL CALC-SCNC: 12 MMOL/L
AST SERPL-CCNC: 17 U/L
BASOPHILS # BLD AUTO: 0.04 K/UL
BASOPHILS NFR BLD AUTO: 0.7 %
BILIRUB SERPL-MCNC: 0.4 MG/DL
BILIRUB UR QL STRIP: NEGATIVE
BUN SERPL-MCNC: 24 MG/DL
CALCIUM SERPL-MCNC: 9.5 MG/DL
CHLORIDE SERPL-SCNC: 102 MMOL/L
CK SERPL-CCNC: 133 U/L
CLARITY UR: CLEAR
CO2 SERPL-SCNC: 26 MMOL/L
COLLECTION METHOD: NORMAL
CREAT SERPL-MCNC: 0.69 MG/DL
CRP SERPL-MCNC: 10 MG/L
EGFR: 96 ML/MIN/1.73M2
ENA SS-A AB SER IA-ACNC: <0.2 AL
ENA SS-B AB SER IA-ACNC: <0.2 AL
EOSINOPHIL # BLD AUTO: 0.04 K/UL
EOSINOPHIL NFR BLD AUTO: 0.7 %
ERYTHROCYTE [SEDIMENTATION RATE] IN BLOOD BY WESTERGREN METHOD: 13 MM/HR
GLUCOSE SERPL-MCNC: 99 MG/DL
GLUCOSE UR-MCNC: NEGATIVE
HAV IGM SER QL: NONREACTIVE
HBV CORE IGG+IGM SER QL: REACTIVE
HBV CORE IGM SER QL: NONREACTIVE
HBV SURFACE AG SER QL: NONREACTIVE
HCG UR QL: 0.2 EU/DL
HCT VFR BLD CALC: 41.9 %
HCV AB SER QL: NONREACTIVE
HCV S/CO RATIO: 0.11 S/CO
HGB BLD-MCNC: 13.3 G/DL
HGB UR QL STRIP.AUTO: NEGATIVE
IGG SUBSET TOTAL IGG: 861 MG/DL
IGG1 SER-MCNC: 333 MG/DL
IGG2 SER-MCNC: 425 MG/DL
IGG3 SER-MCNC: 32 MG/DL
IGG4 SER-MCNC: 7 MG/DL
IMM GRANULOCYTES NFR BLD AUTO: 0.6 %
KETONES UR-MCNC: NORMAL
LDH SERPL-CCNC: 205 U/L
LEUKOCYTE ESTERASE UR QL STRIP: NEGATIVE
LYMPHOCYTES # BLD AUTO: 1.04 K/UL
LYMPHOCYTES NFR BLD AUTO: 19.3 %
MAGNESIUM SERPL-MCNC: 2.1 MG/DL
MAN DIFF?: NORMAL
MCHC RBC-ENTMCNC: 27.7 PG
MCHC RBC-ENTMCNC: 31.7 GM/DL
MCV RBC AUTO: 87.3 FL
MONOCYTES # BLD AUTO: 0.47 K/UL
MONOCYTES NFR BLD AUTO: 8.7 %
NEUTROPHILS # BLD AUTO: 3.77 K/UL
NEUTROPHILS NFR BLD AUTO: 70 %
NITRITE UR QL STRIP: NEGATIVE
PH UR STRIP: 5.5
PHOSPHATE SERPL-MCNC: 2.8 MG/DL
PLATELET # BLD AUTO: 302 K/UL
POTASSIUM SERPL-SCNC: 3.9 MMOL/L
PROT SERPL-MCNC: 6.8 G/DL
PROT UR STRIP-MCNC: NEGATIVE
RBC # BLD: 4.8 M/UL
RBC # FLD: 14.4 %
SODIUM SERPL-SCNC: 141 MMOL/L
SP GR UR STRIP: 1.03
WBC # FLD AUTO: 5.39 K/UL

## 2022-05-01 NOTE — ASSESSMENT
[FreeTextEntry1] : Impression: 66 year-old woman for follow up office visit regarding her joint symptoms and rheumatic diseases, including osteoarthritis, osteopenia, fibromyalgia, chronic low back pain.\par \par Note: Patient last seen in November 2021 (telehealth)\par \par Patient reports discontinuing sulindac secondary to GI upset and bloody mucus, which GI suggested to discontinue. She reports feeling much better in general after discontinuing sulindac. She has occasional pain in bilateral base of thumbs, shoulders, and low back without radiation secondary to her osteoarthritis. On exam, she also has left bicipital tendonitis and left hamstring bursitis which contributes to her left knee pain. Denies morning stiffness, but reports gelling.  She has dry eyes on exam --I will monitor her for Sjogren's syndrome.  The patient continues calcium and vitamin D supplements for her significant osteopenia, but she has only been taking the calcium q.d. instead of b.i.d. she is also due for her annual IV zoledronate for her significant osteopenia, to be performed after July 31.  She has not completed previous lab work ordered. Patient denies rash or side effects with current medications. Patient is content with current medication regimen. \par \par Plan: I reviewed previous lab results with patient with extensive discussion \par Laboratory tests ordered today-see list below-- with coordination of care--\par X-rays ordered--see list below--with coordination of care\par Diagnosis and prognosis discussed\par Continue other current medications (other than those changed below)\par Duloxetine  30 mg q.d. after supper, if no better after 7 days, increase 60 mg q.d. after supper (possible side effects explained)-- patient declined\par Tylenol 1000 mg t.i.d. p.r.n. or Tylenol 1300 mg b.i.d. p.r.n. (possible side effects explained) \par Increase calcium 600 mg twice daily at end of meals (Possible side effects explained)\par Will continue annual IV zoledronate 5 mg--- to be authorized and performed after July 31, 2022, \par in preparation for the IV zoledronate, 24hr urine collection for creatinine clearance one month prior\par Patient declined any other change or addition of any medications at this time\par Artificial tears one drop each eye q.i.d. and p.r.n.(Possible side effects explained)\par Biotin mouthwash/spray q.i.d. and p.r.n.(Possible side effects explained)\par Oral hydration\par Daily exercise starting at 10 minutes per day, gradually increasing to at least 30 minutes per day--emphasized\par Return visit 3 months\par Total time for this office visit, including face-to-face time and non-face-to-face time, 86 minutes--- including review of the chart and previous records, review of her previous lab results, review of recent lab results with extensive discussion with the patient, review of previous imaging reports, ordering of new lab tests with coordination of care, ordering of new x-rays with coordination of care, detailed medication history, ordering of the annual IV zoledronate with coordination of care with the authorization team and nursing staff, review of her medications going forward with her possible side effects, reviewed the impact of her rheumatic disease on her other medical problems, reviewed the impact of her other medical problems on her rheumatic disease

## 2022-05-01 NOTE — HISTORY OF PRESENT ILLNESS
[FreeTextEntry1] : 66 year-old woman for follow up office visit regarding her joint symptoms and rheumatic diseases, including osteoarthritis, osteopenia, fibromyalgia, chronic low back pain.\par \par Note: Patient last seen in November 2021 (telehealth)\par \par Patient feels fairly well. She reports discontinuing sulindac secondary to GI upset and bloody mucus, which GI suggested to discontinue. She reports feeling much better after discontinuing sulindac. She has occasional pain in bilateral base of thumbs, shoulders, and low back without radiation. Denies morning stiffness, but reports gelling.  She has some sleep disturbance and fatigue.  She is not exercising.  She takes Tylenol 1000 mg at bedtime as needed with some relief.  The patient continues calcium and vitamin D supplements, but she has only been taking the calcium q.d. instead of b.i.d. She has not completed previous lab work ordered. Patient denies rash or side effects with current medications. Patient is content with current medication regimen. \par

## 2022-05-01 NOTE — ADDENDUM
[FreeTextEntry1] : I, Mundo Wild, acted solely as a scribe for Dr. Myron I. Kleiner, MD. on 04/21/2022 .

## 2022-05-01 NOTE — CONSULT LETTER
[Dear  ___] : Dear  [unfilled], [Consult Letter:] : I had the pleasure of evaluating your patient, [unfilled]. [Please see my note below.] : Please see my note below. [Consult Closing:] : Thank you very much for allowing me to participate in the care of this patient.  If you have any questions, please do not hesitate to contact me. [Sincerely,] : Sincerely, [DrDaniele  ___] : Dr. ORTIZ [FreeTextEntry3] : Ramesh\par Myron I. Kleiner, M.D., FACR \par Chief, Division of Rheumatology\par Department of Medicine \par [facility]

## 2022-05-01 NOTE — PHYSICAL EXAM
[General Appearance - Alert] : alert [General Appearance - In No Acute Distress] : in no acute distress [General Appearance - Well Nourished] : well nourished [General Appearance - Well Developed] : well developed [General Appearance - Well-Appearing] : healthy appearing [Neck Appearance] : the appearance of the neck was normal [Motor Exam] : the motor exam was normal [No Focal Deficits] : no focal deficits [Oriented To Time, Place, And Person] : oriented to person, place, and time [Impaired Insight] : insight and judgment were intact [Affect] : the affect was normal [Mood] : the mood was normal [Sclera] : the sclera and conjunctiva were normal [PERRL With Normal Accommodation] : pupils were equal in size, round, and reactive to light [Extraocular Movements] : extraocular movements were intact [Outer Ear] : the ears and nose were normal in appearance [Lungs Percussion] : the lungs were normal to percussion [Heart Rate And Rhythm] : heart rate was normal and rhythm regular [Edema] : there was no peripheral edema [Abdomen Soft] : soft [Abdomen Tenderness] : non-tender [Abdomen Mass (___ Cm)] : no abdominal mass palpated [Cervical Lymph Nodes Enlarged Posterior Bilaterally] : posterior cervical [Cervical Lymph Nodes Enlarged Anterior Bilaterally] : anterior cervical [Supraclavicular Lymph Nodes Enlarged Bilaterally] : supraclavicular [Axillary Lymph Nodes Enlarged Bilaterally] : axillary [No CVA Tenderness] : no ~M costovertebral angle tenderness [No Spinal Tenderness] : no spinal tenderness [Skin Color & Pigmentation] : normal skin color and pigmentation [Skin Turgor] : normal skin turgor [] : no rash [Cranial Nerves] : cranial nerves 2-12 were intact [Deep Tendon Reflexes (DTR)] : deep tendon reflexes were 2+ and symmetric [Sensation] : the sensory exam was normal to light touch and pinprick [FreeTextEntry1] : Strength- 5/5

## 2022-05-02 ENCOUNTER — NON-APPOINTMENT (OUTPATIENT)
Age: 67
End: 2022-05-02

## 2022-05-12 ENCOUNTER — NON-APPOINTMENT (OUTPATIENT)
Age: 67
End: 2022-05-12

## 2022-05-26 ENCOUNTER — RX RENEWAL (OUTPATIENT)
Age: 67
End: 2022-05-26

## 2022-07-21 ENCOUNTER — APPOINTMENT (OUTPATIENT)
Dept: RHEUMATOLOGY | Facility: CLINIC | Age: 67
End: 2022-07-21

## 2022-07-21 VITALS
BODY MASS INDEX: 39.56 KG/M2 | TEMPERATURE: 98 F | DIASTOLIC BLOOD PRESSURE: 76 MMHG | HEART RATE: 70 BPM | WEIGHT: 215 LBS | SYSTOLIC BLOOD PRESSURE: 122 MMHG | RESPIRATION RATE: 17 BRPM | OXYGEN SATURATION: 98 % | HEIGHT: 62 IN

## 2022-07-21 DIAGNOSIS — M75.51 BURSITIS OF RIGHT SHOULDER: ICD-10-CM

## 2022-07-21 DIAGNOSIS — H04.123 DRY EYE SYNDROME OF BILATERAL LACRIMAL GLANDS: ICD-10-CM

## 2022-07-21 PROCEDURE — 99215 OFFICE O/P EST HI 40 MIN: CPT

## 2022-07-22 ENCOUNTER — APPOINTMENT (OUTPATIENT)
Dept: RHEUMATOLOGY | Facility: CLINIC | Age: 67
End: 2022-07-22

## 2022-08-05 RX ORDER — ZOLEDRONIC ACID 5 MG/100ML
5 INJECTION INTRAVENOUS
Qty: 1 | Refills: 0 | Status: ACTIVE | COMMUNITY
Start: 2021-05-14

## 2022-08-07 NOTE — HISTORY OF PRESENT ILLNESS
[FreeTextEntry1] : 66 year-old woman for follow up office visit regarding her joint symptoms and rheumatic diseases, including osteoarthritis, osteopenia, fibromyalgia, chronic low back pain.\par \par Patient feels fairly well. She reports intermittent right shoulder pain. Denies any sleep disturbance and fatigue. Some dry eyes and dry mouth, using artificial tears and oral hydration with relief-- she has not tried biotene mouthwash. She continues Tylenol 1000 mg h.s. with relief for her shoulder pains. Patient denies rash or side effects with current medications. Patient is content with current medication regimen. \par \par Shoulders- tender left bicipital tendon, tender left inferior insertion deltoid, tender right subacromial tendon, tender right inferior insertion deltoid, tender right bicipital tendon\par Elbows- normal\par Wrists- tenderness/crepitus bilateral first CMC joints\par Hands- Early Jojo's/Heberden's nodes\par Hips- bilateral decreased external rotation \par Knees- bilateral crepitus, bilateral flex 90 ° \par Ankles- normal\par Feet- normal

## 2022-08-07 NOTE — ASSESSMENT
[FreeTextEntry1] : Impression: 66 year-old woman for follow up office visit regarding her joint symptoms and rheumatic diseases, including osteoarthritis, osteopenia, fibromyalgia, chronic low back pain.\par \par Patient feels fairly well. She reports intermittent right shoulder pain. On exam, in regard to her bilateral shoulder pain, there is evidence of right subacromial bursitis, bilateral bicipital tendinitis, bilateral deltoid tendinitis, and osteoarthritis. Denies any sleep disturbance and fatigue secondary to her fibromyalgia. Some dry eyes and dry mouth secondary to Sjogren's syndrome, using artificial tears and oral hydration with relief-- she has not tried biotene mouthwash. Recent lab tests revealed no significant results or changes, with extensive discussion. Recent x-ray tests revealed osteoarthritis bilateral base of thumbs, hands, left knee, and low back, but no other significant results or changes, with extensive discussion. She continues Tylenol 1000 mg h.s. with relief for her shoulder pains.  I informed patient of the positive total hepatitis B core antibody result and its significance--I am also ordering additional serologies..  Patient denies rash or side effects with current medications. Patient is content with current medication regimen. \par \par Plan: I reviewed previous lab results with patient with extensive discussion \par I reviewed previous x-ray results with patient with extensive discussion\par Laboratory tests ordered today-see list below-- with coordination of care\par X-rays ordered--see list below--with coordination of care\par Diagnosis and prognosis discussed\par Continue other current medications (other than those changed below)\par Patient declined NSAIDs. \par Patient declined Duloxetine at this time.\par Consider short course prednisone--patient declined\par Proceed with IV zoledronate 5 mg infusion to be done after July 31, 2022 (Possible side effects explained including AVN of jaw)--- with coordination of care with the authorization team and nursing staff\par Tylenol 1000 mg t.i.d. p.r.n. or Tylenol 1300 mg b.i.d. p.r.n. (possible side effects explained) \par Patient declined any other change or addition of any medications at this time\par Artificial tears one drop each eye q.i.d. and p.r.n.(Possible side effects explained)\par Biotin mouthwash/spray q.i.d. and p.r.n.(Possible side effects explained)\par Oral hydration\par Daily exercise starting at 10 minutes per day, gradually increasing to at least 30 minutes per day--emphasized\par Return visit 3 months

## 2022-08-07 NOTE — CONSULT LETTER
[Consult Letter:] : I had the pleasure of evaluating your patient, [unfilled]. [Please see my note below.] : Please see my note below. [Consult Closing:] : Thank you very much for allowing me to participate in the care of this patient.  If you have any questions, please do not hesitate to contact me. [Sincerely,] : Sincerely, [DrDaniele  ___] : Dr. ORTIZ [Dear  ___] : Dear  [unfilled], [FreeTextEntry3] : Ramesh\par Myron I. Kleiner, M.D., FACR \par Chief, Division of Rheumatology\par Department of Medicine \par [facility]

## 2022-08-07 NOTE — ADDENDUM
[FreeTextEntry1] : I, Mundo Wild, acted solely as a scribe for Dr. Myron I. Kleiner, MD. on 07/21/2022 .

## 2022-08-11 ENCOUNTER — APPOINTMENT (OUTPATIENT)
Dept: GASTROENTEROLOGY | Facility: CLINIC | Age: 67
End: 2022-08-11

## 2022-08-11 VITALS
HEIGHT: 62 IN | RESPIRATION RATE: 16 BRPM | HEART RATE: 79 BPM | OXYGEN SATURATION: 99 % | TEMPERATURE: 97.9 F | DIASTOLIC BLOOD PRESSURE: 78 MMHG | WEIGHT: 214 LBS | BODY MASS INDEX: 39.38 KG/M2 | SYSTOLIC BLOOD PRESSURE: 138 MMHG

## 2022-08-11 DIAGNOSIS — Z09 ENCOUNTER FOR FOLLOW-UP EXAMINATION AFTER COMPLETED TREATMENT FOR CONDITIONS OTHER THAN MALIGNANT NEOPLASM: ICD-10-CM

## 2022-08-11 PROCEDURE — 99214 OFFICE O/P EST MOD 30 MIN: CPT

## 2022-08-11 RX ORDER — ZINC SULFATE 50(220)MG
CAPSULE ORAL
Refills: 0 | Status: DISCONTINUED | COMMUNITY
End: 2022-08-11

## 2022-08-11 RX ORDER — SODIUM SULFATE, POTASSIUM SULFATE, MAGNESIUM SULFATE 17.5; 3.13; 1.6 G/ML; G/ML; G/ML
17.5-3.13-1.6 SOLUTION, CONCENTRATE ORAL
Qty: 1 | Refills: 0 | Status: DISCONTINUED | COMMUNITY
Start: 2022-08-11 | End: 2022-08-11

## 2022-08-11 RX ORDER — BUDESONIDE 9 MG/1
9 TABLET, EXTENDED RELEASE ORAL DAILY
Qty: 30 | Refills: 2 | Status: DISCONTINUED | COMMUNITY
Start: 2021-10-11 | End: 2022-08-11

## 2022-08-11 NOTE — HISTORY OF PRESENT ILLNESS
[de-identified] : Patient with complaints of upper abdominal discomfort and pain and bloating.  She is taking omeprazole and she seems like it is a little better but its pills persistent now for months.  She was post to have an endoscopy but never had this accomplished.\par \par In addition patient has also colitis.  She is on budesonide 9 mg/day and was having some issues getting under control but basically she is doing well.  Her last colonoscopy was in June 2021 and because of chronic ulcerative colitis she is due for follow-up.  She has no rectal bleeding abdominal pain or diarrhea at this time.

## 2022-08-11 NOTE — ASSESSMENT
[FreeTextEntry1] : I discussed with the patient that given her persistent upper abdominal complaints will be appropriate to do an endoscopy to evaluate the etiology symptoms and rule out an ulcer or more significant disease.  In addition she is due for follow-up colonoscopy.  I also told her I would like to start tapering her budesonide and told her to reduce it to 6 mg/day.  I reviewed her recent laboratory data and was unremarkable.  The indication benefits risk and alternatives of both procedures were discussed with the patient and she is medically optimal for the planned procedures.

## 2022-08-15 ENCOUNTER — APPOINTMENT (OUTPATIENT)
Dept: RHEUMATOLOGY | Facility: CLINIC | Age: 67
End: 2022-08-15

## 2022-08-15 VITALS
HEART RATE: 73 BPM | RESPIRATION RATE: 16 BRPM | OXYGEN SATURATION: 98 % | TEMPERATURE: 98.1 F | SYSTOLIC BLOOD PRESSURE: 114 MMHG | DIASTOLIC BLOOD PRESSURE: 72 MMHG

## 2022-08-15 PROCEDURE — 96374 THER/PROPH/DIAG INJ IV PUSH: CPT

## 2022-08-15 RX ADMIN — ZOLEDRONIC ACID 0 MG/100ML: 5 SOLUTION INTRAVENOUS at 00:00

## 2022-08-16 RX ORDER — ZOLEDRONIC ACID 5 MG/100ML
5 INJECTION INTRAVENOUS
Qty: 0 | Refills: 0 | Status: COMPLETED | OUTPATIENT
Start: 2022-08-15

## 2022-09-08 ENCOUNTER — NON-APPOINTMENT (OUTPATIENT)
Age: 67
End: 2022-09-08

## 2022-09-08 DIAGNOSIS — M25.511 PAIN IN RIGHT SHOULDER: ICD-10-CM

## 2022-09-08 DIAGNOSIS — G89.29 PAIN IN RIGHT SHOULDER: ICD-10-CM

## 2022-09-09 ENCOUNTER — NON-APPOINTMENT (OUTPATIENT)
Age: 67
End: 2022-09-09

## 2022-09-11 LAB — SARS-COV-2 N GENE NPH QL NAA+PROBE: NOT DETECTED

## 2022-10-19 DIAGNOSIS — Z00.00 ENCOUNTER FOR GENERAL ADULT MEDICAL EXAMINATION W/OUT ABNORMAL FINDINGS: ICD-10-CM

## 2022-10-31 ENCOUNTER — TRANSCRIPTION ENCOUNTER (OUTPATIENT)
Age: 67
End: 2022-10-31

## 2022-10-31 LAB — SARS-COV-2 N GENE NPH QL NAA+PROBE: NOT DETECTED

## 2022-11-01 ENCOUNTER — APPOINTMENT (OUTPATIENT)
Dept: GASTROENTEROLOGY | Facility: GI CENTER | Age: 67
End: 2022-11-01

## 2022-11-01 ENCOUNTER — OUTPATIENT (OUTPATIENT)
Dept: OUTPATIENT SERVICES | Facility: HOSPITAL | Age: 67
LOS: 1 days | End: 2022-11-01
Payer: MEDICARE

## 2022-11-01 ENCOUNTER — RESULT REVIEW (OUTPATIENT)
Age: 67
End: 2022-11-01

## 2022-11-01 DIAGNOSIS — R10.13 EPIGASTRIC PAIN: ICD-10-CM

## 2022-11-01 DIAGNOSIS — Z98.890 OTHER SPECIFIED POSTPROCEDURAL STATES: Chronic | ICD-10-CM

## 2022-11-01 DIAGNOSIS — N60.02 SOLITARY CYST OF LEFT BREAST: Chronic | ICD-10-CM

## 2022-11-01 DIAGNOSIS — K51.90 ULCERATIVE COLITIS, UNSPECIFIED, WITHOUT COMPLICATIONS: ICD-10-CM

## 2022-11-01 PROCEDURE — 45380 COLONOSCOPY AND BIOPSY: CPT

## 2022-11-01 PROCEDURE — 43239 EGD BIOPSY SINGLE/MULTIPLE: CPT

## 2022-11-01 PROCEDURE — 88342 IMHCHEM/IMCYTCHM 1ST ANTB: CPT | Mod: 26

## 2022-11-01 PROCEDURE — 88342 IMHCHEM/IMCYTCHM 1ST ANTB: CPT

## 2022-11-01 PROCEDURE — 43239 EGD BIOPSY SINGLE/MULTIPLE: CPT | Mod: 59

## 2022-11-01 PROCEDURE — 88305 TISSUE EXAM BY PATHOLOGIST: CPT | Mod: 26

## 2022-11-01 PROCEDURE — 88305 TISSUE EXAM BY PATHOLOGIST: CPT

## 2022-11-01 NOTE — REASON FOR VISIT
[Procedure: _________] : a [unfilled] procedure visit [FreeTextEntry1] : Epigastric pain, chronic ulcerative colitis

## 2022-11-04 LAB — SURGICAL PATHOLOGY STUDY: SIGNIFICANT CHANGE UP

## 2022-11-08 ENCOUNTER — APPOINTMENT (OUTPATIENT)
Dept: RHEUMATOLOGY | Facility: CLINIC | Age: 67
End: 2022-11-08

## 2022-12-01 ENCOUNTER — RX RENEWAL (OUTPATIENT)
Age: 67
End: 2022-12-01

## 2022-12-15 ENCOUNTER — APPOINTMENT (OUTPATIENT)
Dept: GASTROENTEROLOGY | Facility: CLINIC | Age: 67
End: 2022-12-15

## 2022-12-19 ENCOUNTER — APPOINTMENT (OUTPATIENT)
Dept: ORTHOPEDIC SURGERY | Facility: CLINIC | Age: 67
End: 2022-12-19

## 2022-12-19 VITALS — BODY MASS INDEX: 39.56 KG/M2 | HEIGHT: 62 IN | WEIGHT: 215 LBS

## 2022-12-19 PROCEDURE — 99203 OFFICE O/P NEW LOW 30 MIN: CPT

## 2022-12-19 NOTE — REASON FOR VISIT
Gi Suarez (MD)  [facility]ediatric [facility]oped[facility]  [address] [place] [id number]  Phone: [phone]  Fax: [phone]  Follow Up Time:
[FreeTextEntry2] : NEW PATIENT

## 2022-12-19 NOTE — IMAGING
[de-identified] : Varus deformity\par Mild effusion, no warmth, no ecchymosis\par Medial joint line tenderness to palpation \par Range of motion 5-110 with associated crepitus\par 5/5 quadriceps and hamstring strength\par Ligamentously stable\par Motor and sensory intact distally\par Mildly antalgic gait\par Negative Alex\par

## 2022-12-19 NOTE — ASSESSMENT
[FreeTextEntry1] : ADVANCED VARUS OA\par PRIOR TKA RIGHT SIDE WITH GOOD RELIEF BY MD IN [place]\par NO TX TO LEFT KNEE BUT REPORTS NO RELIEF WITH INJECTION ON THE RIGHT SIDE\par SHE IS INTERESTED IN LEFT TKA, REFERRAL GIVEN

## 2022-12-19 NOTE — HISTORY OF PRESENT ILLNESS
[Gradual] : gradual [7] : 7 [6] : 6 [Localized] : localized [Intermittent] : intermittent [Household chores] : household chores [Standing] : standing [Walking] : walking [Stairs] : stairs [de-identified] : 12/19/22 PRESENTS HERE TODAY WITH LEFT KNEE PAIN FOR YEARS\par NO INJURY \par NO TREATMENT SO FAR  [] : no [FreeTextEntry1] : LEFT KNEE  [FreeTextEntry5] : NO INJURY  [de-identified] : DR KOBI SHAH  [de-identified] :  X RAYS AND MRI DONE AT [facility]  [de-identified] : NOTHING

## 2022-12-22 ENCOUNTER — APPOINTMENT (OUTPATIENT)
Dept: RHEUMATOLOGY | Facility: CLINIC | Age: 67
End: 2022-12-22

## 2022-12-22 VITALS
SYSTOLIC BLOOD PRESSURE: 120 MMHG | TEMPERATURE: 97.4 F | HEART RATE: 75 BPM | DIASTOLIC BLOOD PRESSURE: 65 MMHG | OXYGEN SATURATION: 98 %

## 2022-12-22 DIAGNOSIS — M62.838 OTHER MUSCLE SPASM: ICD-10-CM

## 2022-12-22 PROCEDURE — 81003 URINALYSIS AUTO W/O SCOPE: CPT | Mod: QW

## 2022-12-22 PROCEDURE — 36415 COLL VENOUS BLD VENIPUNCTURE: CPT

## 2022-12-22 PROCEDURE — 99215 OFFICE O/P EST HI 40 MIN: CPT | Mod: 25

## 2022-12-22 RX ORDER — SODIUM PICOSULFATE, MAGNESIUM OXIDE, AND ANHYDROUS CITRIC ACID 10; 3.5; 12 MG/160ML; G/160ML; G/160ML
10-3.5-12 MG-GM LIQUID ORAL
Qty: 2 | Refills: 0 | Status: DISCONTINUED | COMMUNITY
Start: 2022-08-11 | End: 2022-12-22

## 2022-12-22 RX ORDER — ACETAMINOPHEN 500 MG/1
500 CAPSULE, LIQUID FILLED ORAL
Qty: 100 | Refills: 0 | Status: DISCONTINUED | COMMUNITY
Start: 2021-12-05 | End: 2022-12-22

## 2022-12-22 RX ORDER — POLYETHYLENE GLYCOL-3350 AND ELECTROLYTES 236; 6.74; 5.86; 2.97; 22.74 G/274.31G; G/274.31G; G/274.31G; G/274.31G; G/274.31G
236 POWDER, FOR SOLUTION ORAL
Qty: 1 | Refills: 0 | Status: DISCONTINUED | COMMUNITY
Start: 2022-08-18 | End: 2022-12-22

## 2022-12-25 LAB
BILIRUB UR QL STRIP: NORMAL
CLARITY UR: CLEAR
COLLECTION METHOD: NORMAL
GLUCOSE UR-MCNC: NORMAL
HCG UR QL: 0.2 EU/DL
HGB UR QL STRIP.AUTO: NORMAL
KETONES UR-MCNC: NORMAL
LEUKOCYTE ESTERASE UR QL STRIP: NORMAL
NITRITE UR QL STRIP: NORMAL
PH UR STRIP: 5.5
PROT UR STRIP-MCNC: NORMAL
SP GR UR STRIP: 1.02

## 2022-12-25 NOTE — CONSULT LETTER
[Dear  ___] : Dear  [unfilled], [Consult Letter:] : I had the pleasure of evaluating your patient, [unfilled]. [Please see my note below.] : Please see my note below. [Consult Closing:] : Thank you very much for allowing me to participate in the care of this patient.  If you have any questions, please do not hesitate to contact me. [Sincerely,] : Sincerely, [DrDaniele  ___] : Dr. ORTIZ [FreeTextEntry3] : Ramesh\par Myron I. Kleiner, M.D., FACR \par Chief, Division of Rheumatology\par Department of Medicine \par [facility]

## 2022-12-25 NOTE — ASSESSMENT
[FreeTextEntry1] : Impression: 67 year-old woman for follow up office visit regarding her joint symptoms and rheumatic diseases, including osteoarthritis, osteopenia, fibromyalgia, chronic low back pain.\par \par Note: Patient last seen in July 2022.\par \par Patient feels pain "all-over" including neck, base of both thumbs, low back without radiation, hips, and balls of both feet. pain. On exam, in regard to her bilateral shoulder pain, there is bilateral bicipital tendinitis, bilateral deltoid tendinitis, and osteoarthritis contributing to her pains. Denies any sleep disturbance and fatigue secondary to her fibromyalgia. She is not exercising. Recent lab tests revealed some previous exposure to hepatitis-B (she already knows) no significant results or changes, with extensive discussion. Recent x-ray tests revealed multiple points of osteoarthritis and tendinitis in the right shoulder, no significant results or changes, with extensive discussion. She continues calcium (only q.d. secondary to not knowing proper instruction), multivitamins, and vitamin D supplements for her osteopenia. IV Zoledronate (last August 16, 2022)-- stable. Patient denies rash or side effects with current medications. Patient is content with current medication regimen. \par \par Plan: I reviewed previous lab results with patient with extensive discussion \par I reviewed previous x-ray results with patient with extensive discussion\par Laboratory tests ordered today-see list below-- with coordination of care\par Bone densitometry ordered with coordination of care \par Diagnosis and prognosis discussed\par Continue other current medications (other than those changed below)\par Savella Titration Pack, increasing up to 50 mg b.i.d end of meals (Possible side effects explained)-- patient declined\par Savella 50 mg b.i.d. end of meals (Possible side effects explained)-- patient declined\par Gabapentin 300 mg q.d. at suppertime; if no better/side effects 7 days, increase b.i.d.; if no better/side effects 7 days, increase t.i.d.(Possible side effects explained) \par Prednisone 10 mg q.i.d. with food x3 days, t.i.d. x3 days, b.i.d. x3 days, 5 mg b.i.d. x3 days, 5 mg q.d. x3 days, then stop (Possible side effects explained including extensive discussion regarding risks including AVN requiring joint replacement)--- patient declined at this time\par Increase Calcium to 500 mg t.i.d. at the end of meals or 600 mg b.i.d end of meals (Possible side effects explained) \par Patient declined any other change or addition of any medications at this time\par Artificial tears one drop each eye q.i.d. and p.r.n.(Possible side effects explained)\par Biotin mouthwash/spray q.i.d. and p.r.n.(Possible side effects explained)\par Oral hydration\par Daily exercise starting at 10 minutes per day, gradually increasing to at least 30 minutes per day--emphasized\par Urged to see Hepatology (Dr. Gallegos) for consultation regarding evidence of previous Hep-B exposure\par Return visit 3 months

## 2022-12-25 NOTE — PHYSICAL EXAM
[General Appearance - Alert] : alert [General Appearance - In No Acute Distress] : in no acute distress [General Appearance - Well Nourished] : well nourished [General Appearance - Well Developed] : well developed [General Appearance - Well-Appearing] : healthy appearing [Sclera] : the sclera and conjunctiva were normal [PERRL With Normal Accommodation] : pupils were equal in size, round, and reactive to light [Extraocular Movements] : extraocular movements were intact [Outer Ear] : the ears and nose were normal in appearance [Neck Appearance] : the appearance of the neck was normal [Lungs Percussion] : the lungs were normal to percussion [Heart Rate And Rhythm] : heart rate was normal and rhythm regular [Edema] : there was no peripheral edema [Abdomen Soft] : soft [Abdomen Tenderness] : non-tender [Abdomen Mass (___ Cm)] : no abdominal mass palpated [Cervical Lymph Nodes Enlarged Posterior Bilaterally] : posterior cervical [Cervical Lymph Nodes Enlarged Anterior Bilaterally] : anterior cervical [Supraclavicular Lymph Nodes Enlarged Bilaterally] : supraclavicular [Axillary Lymph Nodes Enlarged Bilaterally] : axillary [No CVA Tenderness] : no ~M costovertebral angle tenderness [No Spinal Tenderness] : no spinal tenderness [Skin Color & Pigmentation] : normal skin color and pigmentation [Skin Turgor] : normal skin turgor [] : no rash [Cranial Nerves] : cranial nerves 2-12 were intact [Deep Tendon Reflexes (DTR)] : deep tendon reflexes were 2+ and symmetric [Sensation] : the sensory exam was normal to light touch and pinprick [Motor Exam] : the motor exam was normal [No Focal Deficits] : no focal deficits [Oriented To Time, Place, And Person] : oriented to person, place, and time [Impaired Insight] : insight and judgment were intact [Affect] : the affect was normal [Mood] : the mood was normal [FreeTextEntry1] : Strength- 5/5

## 2022-12-25 NOTE — ADDENDUM
[FreeTextEntry1] : I, Mundo Wild, acted solely as a scribe for Dr. Myron I. Kleiner, MD. on 12/22/2022 .

## 2022-12-25 NOTE — HISTORY OF PRESENT ILLNESS
[FreeTextEntry1] : 67 year-old woman for follow up office visit regarding her joint symptoms and rheumatic diseases, including osteoarthritis, osteopenia, fibromyalgia, chronic low back pain.\par \par Note: Patient last seen in July 2022.\par \par Patient feels pain "all-over" including neck, base of both thumbs, low back without radiation, hips, and balls of both feet. Denies any sleep disturbance and fatigue. She is not exercising. She continues calcium (only q.d. secondary to not knowing proper instruction), multivitamins, and vitamin D supplements. IV Zoledronate (last August 16, 2022)-- stable. Patient denies rash or side effects with current medications. Patient is content with current medication regimen. \par \par PMH:\par Recent Orthopedic visit for consultation for left knee intraarticular viscosupplementation-- she has had right intraarticular supplementation with little relief--- considering left TKA at this time\par \par \par \par \par \par

## 2023-01-03 ENCOUNTER — APPOINTMENT (OUTPATIENT)
Dept: ORTHOPEDIC SURGERY | Facility: CLINIC | Age: 68
End: 2023-01-03
Payer: MEDICARE

## 2023-01-03 VITALS
BODY MASS INDEX: 39.56 KG/M2 | WEIGHT: 215 LBS | HEART RATE: 73 BPM | SYSTOLIC BLOOD PRESSURE: 137 MMHG | HEIGHT: 62 IN | DIASTOLIC BLOOD PRESSURE: 70 MMHG

## 2023-01-03 LAB
25(OH)D3 SERPL-MCNC: 49.5 NG/ML
ALBUMIN MFR SERPL ELPH: 60.2 %
ALBUMIN SERPL ELPH-MCNC: 4.8 G/DL
ALBUMIN SERPL-MCNC: 4.4 G/DL
ALBUMIN/GLOB SERPL: 1.5 RATIO
ALP BLD-CCNC: 69 U/L
ALPHA1 GLOB MFR SERPL ELPH: 4.5 %
ALPHA1 GLOB SERPL ELPH-MCNC: 0.3 G/DL
ALPHA2 GLOB MFR SERPL ELPH: 12.3 %
ALPHA2 GLOB SERPL ELPH-MCNC: 0.9 G/DL
ALT SERPL-CCNC: 37 U/L
ANION GAP SERPL CALC-SCNC: 12 MMOL/L
AST SERPL-CCNC: 25 U/L
B-GLOBULIN MFR SERPL ELPH: 11.5 %
B-GLOBULIN SERPL ELPH-MCNC: 0.8 G/DL
BASOPHILS # BLD AUTO: 0.06 K/UL
BASOPHILS NFR BLD AUTO: 1 %
BILIRUB SERPL-MCNC: 0.5 MG/DL
BUN SERPL-MCNC: 22 MG/DL
CALCIUM SERPL-MCNC: 10 MG/DL
CHLORIDE SERPL-SCNC: 99 MMOL/L
CK SERPL-CCNC: 89 U/L
CO2 SERPL-SCNC: 30 MMOL/L
CREAT SERPL-MCNC: 0.62 MG/DL
CRP SERPL-MCNC: 9 MG/L
DEPRECATED KAPPA LC FREE/LAMBDA SER: 1.26 RATIO
EGFR: 98 ML/MIN/1.73M2
ENA SS-A AB SER IA-ACNC: <0.2 AL
ENA SS-B AB SER IA-ACNC: <0.2 AL
EOSINOPHIL # BLD AUTO: 0.03 K/UL
EOSINOPHIL NFR BLD AUTO: 0.5 %
ERYTHROCYTE [SEDIMENTATION RATE] IN BLOOD BY WESTERGREN METHOD: 13 MM/HR
GAMMA GLOB FLD ELPH-MCNC: 0.8 G/DL
GAMMA GLOB MFR SERPL ELPH: 11.5 %
GLUCOSE SERPL-MCNC: 104 MG/DL
HCT VFR BLD CALC: 43 %
HGB BLD-MCNC: 14.2 G/DL
IGA SER QL IEP: 201 MG/DL
IGG SER QL IEP: 899 MG/DL
IGM SER QL IEP: 47 MG/DL
IMM GRANULOCYTES NFR BLD AUTO: 0.3 %
INTERPRETATION SERPL IEP-IMP: NORMAL
KAPPA LC CSF-MCNC: 0.98 MG/DL
KAPPA LC SERPL-MCNC: 1.23 MG/DL
LDH SERPL-CCNC: 201 U/L
LYMPHOCYTES # BLD AUTO: 1.18 K/UL
LYMPHOCYTES NFR BLD AUTO: 19.6 %
M PROTEIN SPEC IFE-MCNC: NORMAL
MAN DIFF?: NORMAL
MCHC RBC-ENTMCNC: 28.7 PG
MCHC RBC-ENTMCNC: 33 GM/DL
MCV RBC AUTO: 87 FL
MONOCYTES # BLD AUTO: 0.39 K/UL
MONOCYTES NFR BLD AUTO: 6.5 %
NEUTROPHILS # BLD AUTO: 4.35 K/UL
NEUTROPHILS NFR BLD AUTO: 72.1 %
PHOSPHATE SERPL-MCNC: 3.5 MG/DL
PLATELET # BLD AUTO: 315 K/UL
POTASSIUM SERPL-SCNC: 4 MMOL/L
PROT SERPL-MCNC: 7.3 G/DL
RBC # BLD: 4.94 M/UL
RBC # FLD: 13.4 %
SODIUM SERPL-SCNC: 141 MMOL/L
TSH SERPL-ACNC: 0.94 UIU/ML
WBC # FLD AUTO: 6.03 K/UL

## 2023-01-03 PROCEDURE — 73564 X-RAY EXAM KNEE 4 OR MORE: CPT | Mod: LT

## 2023-01-03 PROCEDURE — 99204 OFFICE O/P NEW MOD 45 MIN: CPT

## 2023-01-03 NOTE — PHYSICAL EXAM
[de-identified] : GENERAL APPEARANCE: Well nourished and hydrated, pleasant, alert, and oriented x 3. Appears their stated age. \par HEENT: Normocephalic, extraocular eye motion intact. Nasal septum midline. Oral cavity clear. External auditory canal clear. \par RESPIRATORY: Breath sounds clear and audible in all lobes. No wheezing, No accessory muscle use.\par CARDIOVASCULAR: No apparent abnormalities. No lower leg edema. No varicosities. Pedal pulses are palpable.\par NEUROLOGIC: Sensation is normal, no muscle weakness in the upper or lower extremities.\par DERMATOLOGIC: No apparent skin lesions, moist, warm, no rash.\par SPINE: Cervical spine appears normal and moves freely; thoracic spine appears normal and moves freely; lumbosacral spine appears normal and moves freely, normal, nontender.\par MUSCULOSKELETAL: Hands, wrists, and elbows are normal and move freely, shoulders are normal and move freely. \par Psychiatric: Oriented to person, place, and time, insight and judgement were intact and the affect was normal. \par Musculoskeletal:. Left knee exam shows mild effusion, ROM is 5-1 20 degrees, no instability, no pain with Debra, medial joint line tenderness. \par 5/5 motor strength in bilateral lower extremities. Sensory: Intact in bilateral lower extremities. DTRs: Biceps, brachioradialis, triceps, patellar, ankle and plantar 2+ and symmetric bilaterally. Pulses: dorsalis pedis, posterior tibial, femoral, popliteal, and radial 2+ and symmetric bilaterally. \par Constitutional: Alert and in no acute distress, but well-appearing.  [de-identified] : 4 views left knee obtained the office today show no acute fracture or dislocation.  There is severe medial joint space narrowing bone-on-bone osteoarthritis tricompartmental degenerative changes osteophyte formation consistent with Kellgren-Jamie grade 3 4 changes.

## 2023-01-03 NOTE — DISCUSSION/SUMMARY
[Medication Risks Reviewed] : Medication risks reviewed [Surgical risks reviewed] : Surgical risks reviewed [de-identified] : Patient is a 67-year-old female with severe left knee osteoarthritis presenting today for initial evaluation.  She is not a try conservative treatment I think that is warranted at this time.  We discussed the possibility of a cortisone injection however she is adamant that she does not want receive cortisone injection as she has had multiple ones of these in the past and states that they have not helped the pain in her other knee and therefore she does not wish to try them on her left knee.  She is interested in trying viscosupplementation and I have ordered that for her.  I recommended physical therapy and given a prescription for that.  I recommended meloxicam 15 mg daily as needed for pain.  I will see her back in 6 weeks for repeat evaluation.  If no improvement of symptoms we will proceed with scheduling of total knee arthroplasty at that time.  All questions were asked and answered.

## 2023-01-03 NOTE — H&P PST ADULT - LAST PROSTATE EXAM
Pt is calling to say that she has an infection somewhere but doesn't know where. Pt is looking at getting in as soon as possible as told by her occupational therapist. appt is scheduled in case needed at first available. Pt mentioned that her left knee has had infections in the past but doesn't know if the infection is in her knee, teeth, or elsewhere.     Please call pt    N/A

## 2023-01-03 NOTE — HISTORY OF PRESENT ILLNESS
[de-identified] : Patient is a 67-year-old female here today for evaluation of left knee pain has been going on for many years.  Patient is a previous history of a right total knee arthroplasty and has done well.  States that over the last few months her left knee has begun to hurt her more.  Hurts over the medial and anterior aspects of the knee.  Hurts with navigating stairs and arising reseated position.  Takes Tylenol with some relief of the pain.  States that she has not been to physical therapy or tried injections.  Denies any history of trauma.  Denies any previous surgeries on the knee.  Denies any buckling or locking.

## 2023-01-16 ENCOUNTER — APPOINTMENT (OUTPATIENT)
Dept: GASTROENTEROLOGY | Facility: CLINIC | Age: 68
End: 2023-01-16
Payer: MEDICARE

## 2023-01-16 VITALS
WEIGHT: 215 LBS | RESPIRATION RATE: 14 BRPM | OXYGEN SATURATION: 98 % | TEMPERATURE: 97.5 F | BODY MASS INDEX: 39.56 KG/M2 | SYSTOLIC BLOOD PRESSURE: 114 MMHG | HEART RATE: 72 BPM | DIASTOLIC BLOOD PRESSURE: 85 MMHG | HEIGHT: 62 IN

## 2023-01-16 DIAGNOSIS — R68.81 EARLY SATIETY: ICD-10-CM

## 2023-01-16 PROCEDURE — 99214 OFFICE O/P EST MOD 30 MIN: CPT

## 2023-01-16 RX ORDER — MELOXICAM 15 MG/1
15 TABLET ORAL
Qty: 30 | Refills: 0 | Status: DISCONTINUED | COMMUNITY
Start: 2023-01-03 | End: 2023-01-16

## 2023-01-16 NOTE — ASSESSMENT
[FreeTextEntry1] : I discussed with the patient that she is doing so well vis-à-vis her colitis we could try getting her off the budesonide especially since she thinks that her hair is thinning and losing hair although may not be related since there is very little absorption from the budesonide.  I switched her back to mesalamine 1.2 g 2 pills daily and we will see how she does but if she has recurrence she will switch back and call me.\par \par As for her early satiety feeling I think would be appropriate to do a nuclear gastric emptying test as her endoscopy was normal.  I explained what the test was and why we are doing it and she will call me for the results.

## 2023-01-16 NOTE — REASON FOR VISIT
[Follow-up] : a follow-up of an existing diagnosis [FreeTextEntry1] : Ulcerative colitis, early satiety

## 2023-01-16 NOTE — HISTORY OF PRESENT ILLNESS
[FreeTextEntry1] : Patient with history of chronic ulcerative colitis.  She had endoscopy and colonoscopy done in November which were unremarkable.  In terms of her colitis she is asymptomatic with no abdominal pain or diarrhea or rectal bleeding on budesonide.  She had been on mesalamine before that but was switched when she was having a flare and now she is better.\par \par In addition patient is complaining of early satiety.  She is full full but her also her weight has increased.  Her endoscopy was normal but she says she only eats 1 meal a day and gets full.  She has no nausea or vomiting.  She has no real pain.
foot pain/injury

## 2023-02-06 ENCOUNTER — APPOINTMENT (OUTPATIENT)
Dept: ORTHOPEDIC SURGERY | Facility: CLINIC | Age: 68
End: 2023-02-06
Payer: MEDICARE

## 2023-02-06 VITALS — HEIGHT: 62 IN | WEIGHT: 215 LBS | BODY MASS INDEX: 39.56 KG/M2

## 2023-02-06 PROCEDURE — 20610 DRAIN/INJ JOINT/BURSA W/O US: CPT | Mod: LT

## 2023-02-06 NOTE — DISCUSSION/SUMMARY
[Medication Risks Reviewed] : Medication risks reviewed [de-identified] : Status post Euflexxa injection 1 of 3 left knee.  Tolerated well.  Follow-up in 1 week for repeat injection

## 2023-02-06 NOTE — REASON FOR VISIT
[Follow-Up Visit] : a follow-up visit for [Other: ____] : [unfilled] [FreeTextEntry2] : Left knee Euflexxa inj#1 Lot# [id number] Expires on 2023/12/03.

## 2023-02-06 NOTE — PROCEDURE
[de-identified] : Euflexxa # 1 Left knee\par Discussed at length with the patient the planned Euflexxa injection. The risks, benefits, convalescence and alternatives were reviewed. The possible side effects discussed included but were not limited to: pain, swelling, heat and redness. There symptoms are generally mild but if they are extensive then contact the office. Giving pain relievers by mouth such as NSAID´s or Tylenol can generally treat the reactions to Euflexxa. Rare cases of infection have been noted. Rash, hives and itching may occur post injection. If you have muscle pain or cramps, flushing and or swelling of the face, rapid heart beat, nausea, dizziness, fever, chills, headache, difficulty breathing, swelling in the arms or legs, or have a prickly feeling of your skin, contact a health care provider immediately.\par \par Following this discussion, the knee was prepped with betadine and under sterile condition the Euflexxa injection was performed with a 22 gauge needle. The needle was introduced into the joint, aspiration was performed to ensure intra-articular placement and the medication was injected. Upon withdrawal of the needle the site was cleaned with alcohol and a bandaid applied. The patient tolerated the injection well and there were no adverse effects. Post injection instructions included no strenuous activity for 24 hours, cryotherapy and if there are any adverse effects to contact the office.\par

## 2023-02-13 ENCOUNTER — APPOINTMENT (OUTPATIENT)
Dept: ORTHOPEDIC SURGERY | Facility: CLINIC | Age: 68
End: 2023-02-13
Payer: MEDICARE

## 2023-02-13 VITALS — BODY MASS INDEX: 39.56 KG/M2 | HEIGHT: 62 IN | WEIGHT: 215 LBS

## 2023-02-13 PROCEDURE — 20610 DRAIN/INJ JOINT/BURSA W/O US: CPT | Mod: LT

## 2023-02-13 NOTE — PROCEDURE
[de-identified] : Euflexxa # 2 Left knee\par Discussed at length with the patient the planned Euflexxa injection. The risks, benefits, convalescence and alternatives were reviewed. The possible side effects discussed included but were not limited to: pain, swelling, heat and redness. There symptoms are generally mild but if they are extensive then contact the office. Giving pain relievers by mouth such as NSAID´s or Tylenol can generally treat the reactions to Euflexxa. Rare cases of infection have been noted. Rash, hives and itching may occur post injection. If you have muscle pain or cramps, flushing and or swelling of the face, rapid heart beat, nausea, dizziness, fever, chills, headache, difficulty breathing, swelling in the arms or legs, or have a prickly feeling of your skin, contact a health care provider immediately.\par \par Following this discussion, the knee was prepped with betadine and under sterile condition the Euflexxa injection was performed with a 22 gauge needle. The needle was introduced into the joint, aspiration was performed to ensure intra-articular placement and the medication was injected. Upon withdrawal of the needle the site was cleaned with alcohol and a bandaid applied. The patient tolerated the injection well and there were no adverse effects. Post injection instructions included no strenuous activity for 24 hours, cryotherapy and if there are any adverse effects to contact the office.\par

## 2023-02-13 NOTE — DISCUSSION/SUMMARY
[Medication Risks Reviewed] : Medication risks reviewed [Surgical risks reviewed] : Surgical risks reviewed [de-identified] : Status post Euflexxa injection 2 of 3 left knee. Tolerated well. Follow-up in 1 week for repeat injection.

## 2023-02-13 NOTE — REASON FOR VISIT
[Follow-Up Visit] : a follow-up visit for [Other: ____] : [unfilled] [FreeTextEntry2] : Left knee Euflexxa inj#2 Lot# [id number] Expires on 2023/12/03. \par

## 2023-02-21 ENCOUNTER — APPOINTMENT (OUTPATIENT)
Dept: ORTHOPEDIC SURGERY | Facility: CLINIC | Age: 68
End: 2023-02-21
Payer: MEDICARE

## 2023-02-21 VITALS — HEIGHT: 62 IN | BODY MASS INDEX: 39.56 KG/M2 | WEIGHT: 215 LBS

## 2023-02-21 PROCEDURE — 20610 DRAIN/INJ JOINT/BURSA W/O US: CPT | Mod: LT

## 2023-02-21 NOTE — DISCUSSION/SUMMARY
[de-identified] : D7-year-old female presents to office status post knee Euflexxa injection 3 out of 3 to left knee.  Tolerated procedure well.  Patient will follow-up in 8 weeks for repeat evaluation.  All questions were addressed, patient verbalized understanding and is in agreement with the plan.

## 2023-02-21 NOTE — PROCEDURE
[de-identified] : Euflexxa # 3/3 Left knee\par Discussed at length with the patient the planned Euflexxa injection. The risks, benefits, convalescence and alternatives were reviewed. The possible side effects discussed included but were not limited to: pain, swelling, heat and redness. There symptoms are generally mild but if they are extensive then contact the office. Giving pain relievers by mouth such as NSAID´s or Tylenol can generally treat the reactions to Euflexxa. Rare cases of infection have been noted. Rash, hives and itching may occur post injection. If you have muscle pain or cramps, flushing and or swelling of the face, rapid heart beat, nausea, dizziness, fever, chills, headache, difficulty breathing, swelling in the arms or legs, or have a prickly feeling of your skin, contact a health care provider immediately.\par \par Following this discussion, the knee was prepped with betadine and under sterile condition the Euflexxa injection was performed with a 22 gauge needle. The needle was introduced into the joint, aspiration was performed to ensure intra-articular placement and the medication was injected. Upon withdrawal of the needle the site was cleaned with alcohol and a bandaid applied. The patient tolerated the injection well and there were no adverse effects. Post injection instructions included no strenuous activity for 24 hours, cryotherapy and if there are any adverse effects to contact the office.\par  \par

## 2023-02-22 ENCOUNTER — APPOINTMENT (OUTPATIENT)
Dept: RHEUMATOLOGY | Facility: CLINIC | Age: 68
End: 2023-02-22

## 2023-02-28 ENCOUNTER — APPOINTMENT (OUTPATIENT)
Dept: GASTROENTEROLOGY | Facility: CLINIC | Age: 68
End: 2023-02-28

## 2023-03-06 ENCOUNTER — APPOINTMENT (OUTPATIENT)
Dept: GASTROENTEROLOGY | Facility: CLINIC | Age: 68
End: 2023-03-06
Payer: MEDICARE

## 2023-03-06 VITALS
RESPIRATION RATE: 16 BRPM | WEIGHT: 214 LBS | SYSTOLIC BLOOD PRESSURE: 132 MMHG | OXYGEN SATURATION: 98 % | HEART RATE: 73 BPM | TEMPERATURE: 97.6 F | HEIGHT: 62 IN | DIASTOLIC BLOOD PRESSURE: 80 MMHG | BODY MASS INDEX: 39.38 KG/M2

## 2023-03-06 DIAGNOSIS — Z71.9 COUNSELING, UNSPECIFIED: ICD-10-CM

## 2023-03-06 PROCEDURE — 99215 OFFICE O/P EST HI 40 MIN: CPT

## 2023-03-06 RX ORDER — LOSARTAN POTASSIUM 100 MG/1
100 TABLET, FILM COATED ORAL
Refills: 0 | Status: ACTIVE | COMMUNITY
Start: 2018-05-22

## 2023-03-06 RX ORDER — GABAPENTIN 300 MG/1
300 CAPSULE ORAL
Qty: 90 | Refills: 2 | Status: DISCONTINUED | COMMUNITY
Start: 2022-12-22 | End: 2023-03-06

## 2023-03-07 NOTE — ADDENDUM
[FreeTextEntry1] : I, Adriana Jamison NP, acted as scribe for JENNIFER Huff for this patient encounter. \par Seen and evaluated and discussed with [facility].  Agree with assessment and plan.\par  WOUNDS

## 2023-03-07 NOTE — ASSESSMENT
[FreeTextEntry1] : ISHA TALAVERA is a 67 year old female with a PMH significant for Chronic Ulcerative Colitis, Fibromyalgia and HTN.\par \par Hepatitis B Core Antibody Positive\par Pt was most likely infected or exposed to HBV in the past and cleared infection considering viral load is undetectable. Educted pt on the implications of Hep B core antibody positivity. Advised pt if she should require immunosuppresive therapy in the future, she would require antiviral therapy in conjunction to prevent reactivation of HBV.\par US RUQ and labs ordered including hepatitis B serologies, hepatitis d, and LFTs\par will call pt with results of above workup\par follow up PRN

## 2023-03-07 NOTE — HISTORY OF PRESENT ILLNESS
[de-identified] : ISHA TALAVERA is a 67 year old female with a PMH significant for Chronic Ulcerative Colitis, Fibromyalgia and HTN.\par \par She presents today for evaluation of Hepatitis B core antibody positivity. Core antibody has remained positive on multiple lab draws. Anti-HBc reactive, IgM Anti-HBc NR, HBsAg NR, Anti-HBs reactive, Anti-Hep Be NR, PCR not detected. LFTs normal. Denies abdominal pain or distension, jaundice, hematemesis, hematochezia, dark urine, confusion, unintentional weight loss or gain.

## 2023-03-07 NOTE — PHYSICAL EXAM
[Non-Tender] : non-tender [Smooth] : smooth [General Appearance - Alert] : alert [General Appearance - In No Acute Distress] : in no acute distress [Sclera] : the sclera and conjunctiva were normal [Neck Appearance] : the appearance of the neck was normal [Neck Cervical Mass (___cm)] : no neck mass was observed [Jugular Venous Distention Increased] : there was no jugular-venous distention [Thyroid Diffuse Enlargement] : the thyroid was not enlarged [Thyroid Nodule] : there were no palpable thyroid nodules [Edema] : there was no peripheral edema [Bowel Sounds] : normal bowel sounds [Abdomen Soft] : soft [Abdomen Tenderness] : non-tender [Abdomen Mass (___ Cm)] : no abdominal mass palpated [Abnormal Walk] : normal gait [Nail Clubbing] : no clubbing  or cyanosis of the fingernails [Musculoskeletal - Swelling] : no joint swelling seen [Motor Tone] : muscle strength and tone were normal [Skin Color & Pigmentation] : normal skin color and pigmentation [Skin Turgor] : normal skin turgor [] : no rash [No Focal Deficits] : no focal deficits [Oriented To Time, Place, And Person] : oriented to person, place, and time [Impaired Insight] : insight and judgment were intact [Affect] : the affect was normal [Scleral Icterus] : No Scleral Icterus [Abdominal  Ascites] : no ascites [Splenomegaly] : no splenomegaly [Asterixis] : no asterixis observed [Jaundice] : No jaundice [Palmar Erythema] : no Palmar Erythema [Depression] : no depression [Hallucinations] : ~T no ~M hallucinations

## 2023-03-14 ENCOUNTER — APPOINTMENT (OUTPATIENT)
Dept: ORTHOPEDIC SURGERY | Facility: CLINIC | Age: 68
End: 2023-03-14

## 2023-03-23 ENCOUNTER — APPOINTMENT (OUTPATIENT)
Dept: ORTHOPEDIC SURGERY | Facility: CLINIC | Age: 68
End: 2023-03-23
Payer: MEDICARE

## 2023-03-23 VITALS — WEIGHT: 215 LBS | HEIGHT: 62 IN | BODY MASS INDEX: 39.56 KG/M2

## 2023-03-23 DIAGNOSIS — M25.571 PAIN IN RIGHT ANKLE AND JOINTS OF RIGHT FOOT: ICD-10-CM

## 2023-03-23 DIAGNOSIS — M25.572 PAIN IN LEFT ANKLE AND JOINTS OF LEFT FOOT: ICD-10-CM

## 2023-03-23 DIAGNOSIS — M79.673 PAIN IN UNSPECIFIED FOOT: ICD-10-CM

## 2023-03-23 PROCEDURE — 99214 OFFICE O/P EST MOD 30 MIN: CPT

## 2023-03-23 PROCEDURE — 73630 X-RAY EXAM OF FOOT: CPT | Mod: 50

## 2023-03-23 PROCEDURE — 73600 X-RAY EXAM OF ANKLE: CPT | Mod: 50

## 2023-03-23 NOTE — PHYSICAL EXAM
[Bilateral] : foot and ankle bilaterally [Mild] : mild diffused ankle swelling [NL (40)] : plantar flexion 40 degrees [NL 30)] : inversion 30 degrees [NL (20)] : eversion 20 degrees [5___] : eversion 5[unfilled]/5 [2+] : dorsalis pedis pulse: 2+ [] : patient ambulates without assistive device

## 2023-03-23 NOTE — IMAGING
[Bilateral] : foot bilaterally [FreeTextEntry9] : mild degen changes [de-identified] : mild degen changes

## 2023-03-23 NOTE — HISTORY OF PRESENT ILLNESS
[4] : 4 [2] : 2 [Dull/Aching] : dull/aching [de-identified] : 03/23/2023: couple months foot/ankle pain. does not recall any specific injury. no tx to date. no prior sig foot probs. denies dm/tob.  [FreeTextEntry1] : huber ankle/foot

## 2023-03-28 ENCOUNTER — OFFICE (OUTPATIENT)
Dept: URBAN - METROPOLITAN AREA CLINIC 104 | Facility: CLINIC | Age: 68
Setting detail: OPHTHALMOLOGY
End: 2023-03-28
Payer: COMMERCIAL

## 2023-03-28 DIAGNOSIS — H40.013: ICD-10-CM

## 2023-03-28 DIAGNOSIS — H01.001: ICD-10-CM

## 2023-03-28 DIAGNOSIS — H04.123: ICD-10-CM

## 2023-03-28 DIAGNOSIS — H01.004: ICD-10-CM

## 2023-03-28 DIAGNOSIS — H25.13: ICD-10-CM

## 2023-03-28 DIAGNOSIS — H43.813: ICD-10-CM

## 2023-03-28 DIAGNOSIS — H01.005: ICD-10-CM

## 2023-03-28 DIAGNOSIS — H01.002: ICD-10-CM

## 2023-03-28 PROCEDURE — 92133 CPTRZD OPH DX IMG PST SGM ON: CPT | Performed by: OPTOMETRIST

## 2023-03-28 PROCEDURE — 92014 COMPRE OPH EXAM EST PT 1/>: CPT | Performed by: OPTOMETRIST

## 2023-03-28 ASSESSMENT — VISUAL ACUITY
OD_BCVA: 20/40-2
OS_BCVA: 20/25+1

## 2023-03-28 ASSESSMENT — SUPERFICIAL PUNCTATE KERATITIS (SPK)
OD_SPK: 1+ 2+
OS_SPK: 1+ 2+

## 2023-03-28 ASSESSMENT — KERATOMETRY
OS_AXISANGLE_DEGREES: 104
OS_K1POWER_DIOPTERS: 43.72
OS_K2POWER_DIOPTERS: 44.47
OD_K2POWER_DIOPTERS: 44.82
OD_K1POWER_DIOPTERS: 43.49
OD_AXISANGLE_DEGREES: 089

## 2023-03-28 ASSESSMENT — REFRACTION_MANIFEST
OS_CYLINDER: SPH
OS_SPHERE: +1.00
OD_VA1: 20/20
OD_ADD: +2.50
OS_VA1: 20/20
OD_AXIS: 140
OS_VA2: 20/20(J1+)
OD_VA2: 20/20(J1+)
OD_SPHERE: +1.25
OS_ADD: +2.50
OD_CYLINDER: -0.50

## 2023-03-28 ASSESSMENT — REFRACTION_CURRENTRX
OD_VPRISM_DIRECTION: SV
OD_OVR_VA: 20/
OD_SPHERE: +2.50
OS_VPRISM_DIRECTION: SV
OS_OVR_VA: 20/
OS_SPHERE: +2.50

## 2023-03-28 ASSESSMENT — SPHEQUIV_DERIVED
OD_SPHEQUIV: 1
OD_SPHEQUIV: 0.5
OS_SPHEQUIV: 1.875

## 2023-03-28 ASSESSMENT — AXIALLENGTH_DERIVED
OD_AL: 22.9781
OS_AL: 22.6795
OD_AL: 23.1644

## 2023-03-28 ASSESSMENT — REFRACTION_AUTOREFRACTION
OS_SPHERE: +2.25
OD_SPHERE: +1.25
OD_AXIS: 180
OD_CYLINDER: -1.50
OS_AXIS: 056
OS_CYLINDER: -0.75

## 2023-03-28 ASSESSMENT — LID EXAM ASSESSMENTS
OD_BLEPHARITIS: RLL RUL 2+
OS_BLEPHARITIS: LLL LUL 2+

## 2023-03-28 ASSESSMENT — TONOMETRY
OS_IOP_MMHG: 20
OD_IOP_MMHG: 20

## 2023-04-17 ENCOUNTER — NON-APPOINTMENT (OUTPATIENT)
Age: 68
End: 2023-04-17

## 2023-05-03 ENCOUNTER — APPOINTMENT (OUTPATIENT)
Dept: ORTHOPEDIC SURGERY | Facility: CLINIC | Age: 68
End: 2023-05-03
Payer: MEDICARE

## 2023-05-03 VITALS — HEIGHT: 62 IN | BODY MASS INDEX: 39.56 KG/M2 | WEIGHT: 215 LBS

## 2023-05-03 PROCEDURE — 99214 OFFICE O/P EST MOD 30 MIN: CPT | Mod: 57

## 2023-05-03 PROCEDURE — 25605 CLTX DST RDL FX/EPHYS SEP W/: CPT | Mod: RT

## 2023-05-03 PROCEDURE — 73110 X-RAY EXAM OF WRIST: CPT | Mod: RT

## 2023-05-03 NOTE — REVIEW OF SYSTEMS
[Joint Pain] : joint pain [Joint Stiffness] : joint stiffness [Joint Swelling] : joint swelling [Negative] : Heme/Lymph [FreeTextEntry9] : right wrist pain

## 2023-05-03 NOTE — DISCUSSION/SUMMARY
[de-identified] : 67-year-old female with right distal radius fracture.  We discussed that given the fracture alignment, I expect she will do well without operative intervention.  She will be placed into a short arm cast today.  We will have her come back in 4 weeks with out of plaster x-rays.  Depending on the amount of healing noted at that time, we will either transition her into a Medley fracture type brace or a new cast. \par \par Osteopenia and osteoporosis are significant risk factors for fragility fractures. Given the type of fracture that has occurred, I would highly recommend that the patient followup with their primary care physician to discuss treatment for low bone mineral density. We discussed the benefits of these medications frequently far outweigh the small risks. Their primary care physician can call the office with any specific questions or concerns.\par \par The patient was given the opportunity to ask questions and all questions were answered to their satisfaction.\par \par Dane Price MD\par Orthopaedic Trauma Surgeon\par [facility]\par [facility]\par Director Orthopaedic Trauma, [facility]\par \par \par \par

## 2023-05-03 NOTE — PHYSICAL EXAM
[de-identified] : Physical Exam:\par General: Well appearing, no acute distress, A&O\par Neurologic: A&Ox3, No focal deficits\par Head: NCAT without abrasions, lacerations, or ecchymosis to head, face, or scalp\par Respiratory: Equal chest wall expansion bilaterally, no accessory muscle use\par Lymphatic: No lymphadenopathy palpated\par Skin: Warm and dry\par Psychiatric: Normal mood and affect\par \par RUE:\par SILT r/m/u\par Fires AIN/PIN/ulnar\par 2+ radial pulse\par brisk capillary refill\par Moderate tenderness to palpation around the distal radius\par Moderate swelling around the distal radius\par Mild ecchymosis [de-identified] : 3 views of the right wrist obtained today show a minimally displaced distal radius fracture with articular involvement but minimal articular incongruity

## 2023-05-03 NOTE — HISTORY OF PRESENT ILLNESS
[de-identified] : Patient is a pleasant 67-year-old female who presents today for evaluation of right wrist pain.  She suffered a fall yesterday and was seen at [facility] emergency department.  X-rays were obtained and a distal radius fracture was diagnosed.  She was placed into a splint and referred to come see us today.  She has seen our practice previously for other issues.  The patient states the pain is made worse with activity and relieved with rest.  Sharp, 3 out of 10 [Bending] : worsened by bending [Lifting] : worsened by lifting [Weight Bearing] : worsened by weight bearing [Recumbency] : relieved by recumbency [Rest] : relieved by rest

## 2023-05-03 NOTE — PROCEDURE
[de-identified] : The patient was placed into a right short arm cast today.  A ulnar deviated flexed mold was performed to reduce the fracture further.  She tolerated the reduction well.  Repeat neurovascular exam after casting showed no neurovascular changes.

## 2023-05-08 ENCOUNTER — APPOINTMENT (OUTPATIENT)
Dept: GASTROENTEROLOGY | Facility: CLINIC | Age: 68
End: 2023-05-08

## 2023-05-08 ENCOUNTER — APPOINTMENT (OUTPATIENT)
Dept: GASTROENTEROLOGY | Facility: CLINIC | Age: 68
End: 2023-05-08
Payer: MEDICARE

## 2023-05-08 VITALS
HEIGHT: 62 IN | OXYGEN SATURATION: 98 % | WEIGHT: 215 LBS | SYSTOLIC BLOOD PRESSURE: 130 MMHG | BODY MASS INDEX: 39.56 KG/M2 | RESPIRATION RATE: 16 BRPM | HEART RATE: 80 BPM | DIASTOLIC BLOOD PRESSURE: 80 MMHG

## 2023-05-08 DIAGNOSIS — Z09 ENCOUNTER FOR FOLLOW-UP EXAMINATION AFTER COMPLETED TREATMENT FOR CONDITIONS OTHER THAN MALIGNANT NEOPLASM: ICD-10-CM

## 2023-05-08 PROCEDURE — 99214 OFFICE O/P EST MOD 30 MIN: CPT

## 2023-05-08 NOTE — ASSESSMENT
[FreeTextEntry1] : 67-year-old lady history of fibromyalgia, osteoarthritis, osteopenia, hypertension, ulcerative colitis on prednisone and mesalamine only, here to establish care after being seen by Dr. Sullivan.  Attempt to stop the budesonide resulted in a flare.  She subsequently was on a prednisone taper currently at 20 mg.  Biologics have never been discussed with her.  Her last colonoscopy indicates no inflammation, the one before shows quiescent colitis in all the segments biopsied apart from the rectum which was active colitis.  Neither colonoscopy obtained biopsies from the terminal ileum

## 2023-05-08 NOTE — HISTORY OF PRESENT ILLNESS
[FreeTextEntry1] : 67-year-old lady history of diffuse arthralgias, obesity, osteopenia, herniated disc, hypertension, ulcerative colitis, patient of Dr. Sullivan's transferring care to me now\par \par losing hair\par thought 2/2 med\par 1/23 switched off budesonide to ASA\par 4/23 increased sx\par switched to pred - 20 mg now, tapered from 40\par 3rd week of taper\par \par \par \par Initial dx: Many years ago\par UC: proctitis\par Presentation:\par Meds failed: Budesonide caused hair loss\par Current meds: Prednisone, mesalamine\par Last colonoscopy:\par \par \par 11/22 Colon: proctitis endoscopically\par EGD: nl\par Path:\par 1.     Gastric antrum, biopsy:\par - Gastric mucosa with reactive gastropathy.\par - Immunostains for H. Pylori are negative.\par 2.      Gastric body, biopsy\par - Gastric mucosa with reactive gastropathy.\par - Immunostains for H. Pylori are negative.\par 3  Cecum biopsy:\par - Colonic mucosa with intramucosal lymphoid aggregates, negative for\par inflammation.\par 4  Ascending biopsy:\par - Colonic mucosa with intramucosal lymphoid aggregates, negative for\par inflammation.\par 5  Transverse biopsy:\par - Colonic mucosa with intramucosal lymphoid aggregates, negative for\par inflammation.\par 6  Descending biopsy:\par - Colonic mucosa with intramucosal lymphoid aggregates, negative for\par inflammation.\par 7  Sigmoid biopsy:\par - Colonic mucosa with intramucosal lymphoid aggregates, negative for\par inflammation.\par 8  Rectum biopsy:\par - Colonic mucosa with intramucosal lymphoid aggregates, negative for\par inflammation.\par \par note: Negative for microscopic colitis.\par \par Path '21:\par Final Diagnosis\par 1. Cecum, biopsy:\par - Consistent with quiescent inflammatory bowel disease.\par - Negative for granulomas. Negative for dysplasia.\par \par 2. Ascending colon, biopsy:\par - Consistent with quiescent inflammatory bowel disease.\par - Negative for granulomas. Negative for dysplasia.\par \par 3. Transverse colon, biopsy:\par - Consistent with focal active inflammatory bowel disease with\par erosion and reparative changes.\par - Negative for granulomas. Negative for dysplasia.\par \par 4. Descending colon, biopsy:\par - Consistent with quiescent inflammatory bowel disease.\par - Negative for granulomas. Negative for dysplasia.\par \par 5. Sigmoid colon, biopsy:\par - Consistent with quiescent inflammatory bowel disease.\par - Negative for granulomas. Negative for dysplasia.\par \par 6. Rectum, biopsy:\par - Consistent with active inflammatory bowel disease with\par ulceration.\par - Negative for granulomas. Negative for dysplasia.\par '11 Colon: nl\par \par \par Last imaging:'21 CT\par IMPRESSION:\par No significant colonic wall thickening and be seen but there is evidence of mild submucosal fatty proliferation involving the rectum and sigmoid colon which can be seen in a chronic colitis. No small bowel disease demonstrated.\par \par 3.5 cm simple-appearing postmenopausal right ovarian cyst amenable to ultrasound follow-up\par \par Surgery:\par \par BM/D:2\par with flare 10-12 a day\par flares 2-3 mo every year\par BM/night:0\par Blood:no\par Mucus:no\par Weight loss:no\par Fatigue:+\par \par Joint aches:arthritis; r knee replacement, left knee injections\par Skin issues:no\par Eye issues: dry, burning (saw doc for drops)\par \par TB:\par HBV:\par

## 2023-05-31 ENCOUNTER — APPOINTMENT (OUTPATIENT)
Dept: ORTHOPEDIC SURGERY | Facility: CLINIC | Age: 68
End: 2023-05-31
Payer: MEDICARE

## 2023-05-31 VITALS — BODY MASS INDEX: 39.56 KG/M2 | HEIGHT: 62 IN | WEIGHT: 215 LBS

## 2023-05-31 DIAGNOSIS — S52.571A OTHER INTRAARTICULAR FRACTURE OF LOWER END OF RIGHT RADIUS, INITIAL ENCOUNTER FOR CLOSED FRACTURE: ICD-10-CM

## 2023-05-31 PROCEDURE — 73110 X-RAY EXAM OF WRIST: CPT | Mod: RT

## 2023-05-31 PROCEDURE — 99024 POSTOP FOLLOW-UP VISIT: CPT

## 2023-05-31 PROCEDURE — 29125 APPL SHORT ARM SPLINT STATIC: CPT | Mod: 58,RT

## 2023-05-31 NOTE — HISTORY OF PRESENT ILLNESS
[de-identified] : Pt is a 67F presenting for interval xrays and cast removal after a R distal radius fracture 5/2/23. Pt denies new onset numbness, weakness or tingling in the extremities. [3] : a current pain level of 3/10 [Bending] : worsened by bending [Lifting] : worsened by lifting [Weight Bearing] : worsened by weight bearing [Recumbency] : relieved by recumbency [Rest] : relieved by rest [de-identified] : aching

## 2023-05-31 NOTE — PHYSICAL EXAM
[de-identified] : RUE:\par Skin: No erythema, edema or gross lesions noted. Cock up splint c/d/i\par NTTP\par SILT C5-T1\par Motor: +AIN/PIN/U/Ax\par Compartments soft and compressible\par 2+ RP [de-identified] : Interval 3 view xrays of the right wrist demonstrate a healing distal radius fracture with intra-articular extension.

## 2023-05-31 NOTE — END OF VISIT
[] : Resident [FreeTextEntry3] : agree with above [Time Spent: ___ minutes] : I have spent [unfilled] minutes of time on the encounter.

## 2023-05-31 NOTE — ASSESSMENT
[FreeTextEntry1] : Pt is a 67F with a R  fx 5/2/23 now here for interval xrays\par \par -NWB in removable splint w ROM exercises OT\par -Pain regimen PRN\par -Follow up PRN

## 2023-06-09 ENCOUNTER — OFFICE (OUTPATIENT)
Dept: URBAN - METROPOLITAN AREA CLINIC 104 | Facility: CLINIC | Age: 68
Setting detail: OPHTHALMOLOGY
End: 2023-06-09
Payer: COMMERCIAL

## 2023-06-09 ENCOUNTER — APPOINTMENT (OUTPATIENT)
Dept: RHEUMATOLOGY | Facility: CLINIC | Age: 68
End: 2023-06-09

## 2023-06-09 DIAGNOSIS — H11.32: ICD-10-CM

## 2023-06-09 PROCEDURE — 99213 OFFICE O/P EST LOW 20 MIN: CPT | Performed by: OPTOMETRIST

## 2023-06-09 ASSESSMENT — VISUAL ACUITY
OS_BCVA: 20/25-2
OD_BCVA: 20/40-2

## 2023-06-09 ASSESSMENT — REFRACTION_MANIFEST
OS_ADD: +2.50
OD_CYLINDER: -0.50
OD_VA1: 20/20
OD_AXIS: 140
OS_VA1: 20/20
OS_SPHERE: +1.00
OS_CYLINDER: SPH
OD_SPHERE: +1.25
OD_VA2: 20/20(J1+)
OS_VA2: 20/20(J1+)
OD_ADD: +2.50

## 2023-06-09 ASSESSMENT — AXIALLENGTH_DERIVED
OD_AL: 23.1644
OS_AL: 22.6795
OD_AL: 22.9781

## 2023-06-09 ASSESSMENT — REFRACTION_CURRENTRX
OS_CYLINDER: SPH
OS_SPHERE: +2.50
OD_OVR_VA: 20/
OD_VPRISM_DIRECTION: SV
OD_SPHERE: +2.50
OD_CYLINDER: SPH
OS_VPRISM_DIRECTION: SV
OS_OVR_VA: 20/

## 2023-06-09 ASSESSMENT — TONOMETRY
OD_IOP_MMHG: 17
OS_IOP_MMHG: 17

## 2023-06-09 ASSESSMENT — REFRACTION_AUTOREFRACTION
OS_AXIS: 056
OD_CYLINDER: -1.50
OD_SPHERE: +1.25
OS_CYLINDER: -0.75
OD_AXIS: 180
OS_SPHERE: +2.25

## 2023-06-09 ASSESSMENT — LID EXAM ASSESSMENTS
OD_BLEPHARITIS: RLL RUL 2+
OS_BLEPHARITIS: LLL LUL 2+

## 2023-06-09 ASSESSMENT — SPHEQUIV_DERIVED
OD_SPHEQUIV: 1
OS_SPHEQUIV: 1.875
OD_SPHEQUIV: 0.5

## 2023-06-09 ASSESSMENT — KERATOMETRY
OS_K1POWER_DIOPTERS: 43.72
OS_AXISANGLE_DEGREES: 104
OD_K2POWER_DIOPTERS: 44.82
OS_K2POWER_DIOPTERS: 44.47
OD_K1POWER_DIOPTERS: 43.49
OD_AXISANGLE_DEGREES: 089

## 2023-06-09 ASSESSMENT — CONFRONTATIONAL VISUAL FIELD TEST (CVF)
OS_FINDINGS: FULL
OD_FINDINGS: FULL

## 2023-06-14 ENCOUNTER — TRANSCRIPTION ENCOUNTER (OUTPATIENT)
Age: 68
End: 2023-06-14

## 2023-06-14 LAB
ALBUMIN SERPL ELPH-MCNC: 4.5 G/DL
ALP BLD-CCNC: 83 U/L
ALT SERPL-CCNC: 21 U/L
ANION GAP SERPL CALC-SCNC: 11 MMOL/L
AST SERPL-CCNC: 16 U/L
BILIRUB SERPL-MCNC: 0.4 MG/DL
BUN SERPL-MCNC: 22 MG/DL
CALCIUM SERPL-MCNC: 9.9 MG/DL
CHLORIDE SERPL-SCNC: 102 MMOL/L
CO2 SERPL-SCNC: 28 MMOL/L
CREAT SERPL-MCNC: 0.71 MG/DL
CRP SERPL-MCNC: 14 MG/L
EGFR: 94 ML/MIN/1.73M2
GLUCOSE SERPL-MCNC: 105 MG/DL
HBV CORE IGM SER QL: NONREACTIVE
HBV SURFACE AB SER QL: NONREACTIVE
HBV SURFACE AG SER QL: NONREACTIVE
M TB IFN-G BLD-IMP: NEGATIVE
POTASSIUM SERPL-SCNC: 4.5 MMOL/L
PROT SERPL-MCNC: 6.9 G/DL
QUANTIFERON TB PLUS MITOGEN MINUS NIL: 6.14 IU/ML
QUANTIFERON TB PLUS NIL: 0.02 IU/ML
QUANTIFERON TB PLUS TB1 MINUS NIL: 0 IU/ML
QUANTIFERON TB PLUS TB2 MINUS NIL: 0.01 IU/ML
SODIUM SERPL-SCNC: 142 MMOL/L

## 2023-06-19 ENCOUNTER — NON-APPOINTMENT (OUTPATIENT)
Age: 68
End: 2023-06-19

## 2023-06-19 LAB
AFP-TM SERPL-MCNC: 2.3 NG/ML
ALBUMIN SERPL ELPH-MCNC: 4.5 G/DL
ALP BLD-CCNC: 85 U/L
ALT SERPL-CCNC: 22 U/L
ANION GAP SERPL CALC-SCNC: 17 MMOL/L
AST SERPL-CCNC: 25 U/L
BILIRUB SERPL-MCNC: 0.4 MG/DL
BUN SERPL-MCNC: 21 MG/DL
CALCIUM SERPL-MCNC: 9.7 MG/DL
CHLORIDE SERPL-SCNC: 98 MMOL/L
CO2 SERPL-SCNC: 26 MMOL/L
CREAT SERPL-MCNC: 0.68 MG/DL
EGFR: 96 ML/MIN/1.73M2
GLUCOSE SERPL-MCNC: 100 MG/DL
HBV CORE IGG+IGM SER QL: REACTIVE
HBV E AB SER QL: NONREACTIVE
HBV E AG SER QL: NONREACTIVE
HBV SURFACE AB SER QL: ABNORMAL
HBV SURFACE AG SER QL: NONREACTIVE
HDV AB SER IA-ACNC: NEGATIVE
HEPB DNA PCR INT: NOT DETECTED
HEPB DNA PCR LOG: NOT DETECTED LOGIU/ML
INR PPP: 1.05 RATIO
POTASSIUM SERPL-SCNC: 4.6 MMOL/L
PROT SERPL-MCNC: 6.9 G/DL
PT BLD: 12.4 SEC
SODIUM SERPL-SCNC: 142 MMOL/L

## 2023-06-20 ENCOUNTER — APPOINTMENT (OUTPATIENT)
Dept: OBGYN | Facility: CLINIC | Age: 68
End: 2023-06-20
Payer: MEDICARE

## 2023-06-20 ENCOUNTER — NON-APPOINTMENT (OUTPATIENT)
Age: 68
End: 2023-06-20

## 2023-06-20 VITALS
OXYGEN SATURATION: 96 % | HEIGHT: 62 IN | BODY MASS INDEX: 39.75 KG/M2 | HEART RATE: 74 BPM | SYSTOLIC BLOOD PRESSURE: 134 MMHG | WEIGHT: 216 LBS | DIASTOLIC BLOOD PRESSURE: 81 MMHG

## 2023-06-20 DIAGNOSIS — Z12.39 ENCOUNTER FOR OTHER SCREENING FOR MALIGNANT NEOPLASM OF BREAST: ICD-10-CM

## 2023-06-20 DIAGNOSIS — B37.31 ACUTE CANDIDIASIS OF VULVA AND VAGINA: ICD-10-CM

## 2023-06-20 DIAGNOSIS — Z11.51 ENCOUNTER FOR SCREENING FOR HUMAN PAPILLOMAVIRUS (HPV): ICD-10-CM

## 2023-06-20 DIAGNOSIS — Z13.820 ENCOUNTER FOR SCREENING FOR OSTEOPOROSIS: ICD-10-CM

## 2023-06-20 PROCEDURE — 99397 PER PM REEVAL EST PAT 65+ YR: CPT

## 2023-06-20 NOTE — HISTORY OF PRESENT ILLNESS
[Patient reported colonoscopy was normal] : Patient reported colonoscopy was normal [HIV test declined] : HIV test declined [Syphilis test declined] : Syphilis test declined [Gonorrhea test declined] : Gonorrhea test declined [Chlamydia test declined] : Chlamydia test declined [Trichomonas test declined] : Trichomonas test declined [HPV test declined] : HPV test declined [Hepatitis B test declined] : Hepatitis B test declined [Hepatitis C test declined] : Hepatitis C test declined [postmenopausal] : postmenopausal [N] : Patient does not use contraception [Y] : Positive pregnancy history [N/A] : pregnancy not applicable [Currently Active] : currently active [Men] : men [Vaginal] : vaginal [No] : No [TextBox_4] : ANNUAL\par LMP:2011 [Mammogramdate] : 9/22/21 [TextBox_19] : BR2 [PapSmeardate] : 5/12/21 [TextBox_31] : WNL [BoneDensityDate] : 11/10/20 [TextBox_37] : OSTEOPENIA [ColonoscopyDate] : 11/1/22 [TextBox_43] : WNL [LMPDate] : 2011 [PGHxTotal] : 5 [[facility] : 4 [PGHxAbortions] : 1 [[facility] Center[facility]Living] : 4 [TextBox_6] : 2011 [FreeTextEntry1] : 2011

## 2023-06-20 NOTE — PHYSICAL EXAM
[Chaperone Present] : A chaperone was present in the examining room during all aspects of the physical examination [Appropriately responsive] : appropriately responsive [Alert] : alert [No Acute Distress] : no acute distress [No Lymphadenopathy] : no lymphadenopathy [Regular Rate Rhythm] : regular rate rhythm [No Murmurs] : no murmurs [Clear to Auscultation B/L] : clear to auscultation bilaterally [Soft] : soft [Non-tender] : non-tender [Non-distended] : non-distended [No HSM] : No HSM [No Lesions] : no lesions [No Mass] : no mass [Oriented x3] : oriented x3 [Examination Of The Breasts] : a normal appearance [Breast Palpation Diffuse Fibrous Tissue Bilateral] : fibrocystic changes [No Masses] : no breast masses were palpable [Labia Majora] : normal [Labia Minora] : normal [Discharge] : a  ~M vaginal discharge was present [Scant] : scant [White] : white [Cheesy] : cheesy [Normal] : normal [Uterine Adnexae] : normal [Declined] : Patient declined rectal exam [FreeTextEntry1] : SM [FreeTextEntry6] : Symmetrical no masses [FreeTextEntry8] : No masses nontender

## 2023-06-20 NOTE — REVIEW OF SYSTEMS
[Patient Intake Form Reviewed] : Patient intake form was reviewed [Negative] : Heme/Lymph [Urgency] : urgency [Frequency] : frequency [Incontinence] : incontinence [FreeTextEntry7] : Ulcerative colitis

## 2023-06-20 NOTE — PLAN
[FreeTextEntry1] : Pap smear completed patient with a history of stress incontinence with frequency urgency and nocturia she has been given a referral to Dr Yoandy Duron urogynecology for evaluation and urodynamics and possible TVT procedure.  Patient will go for bone density she will continue vitamin D3 follow-up Dr. Kleiner she will be treated with Terazol No. 3 for her monilial vaginitis continue to follow-up with GI for ulcerative colitis and she will have transvaginal sonogram patient will return here in 1 year

## 2023-06-21 ENCOUNTER — APPOINTMENT (OUTPATIENT)
Dept: RHEUMATOLOGY | Facility: CLINIC | Age: 68
End: 2023-06-21
Payer: MEDICARE

## 2023-06-21 VITALS
DIASTOLIC BLOOD PRESSURE: 67 MMHG | HEART RATE: 81 BPM | TEMPERATURE: 97.3 F | SYSTOLIC BLOOD PRESSURE: 120 MMHG | OXYGEN SATURATION: 96 %

## 2023-06-21 DIAGNOSIS — M25.50 PAIN IN UNSPECIFIED JOINT: ICD-10-CM

## 2023-06-21 LAB — HPV HIGH+LOW RISK DNA PNL CVX: NOT DETECTED

## 2023-06-21 PROCEDURE — G2212 PROLONG OUTPT/OFFICE VIS: CPT

## 2023-06-21 PROCEDURE — 36415 COLL VENOUS BLD VENIPUNCTURE: CPT

## 2023-06-21 PROCEDURE — 99215 OFFICE O/P EST HI 40 MIN: CPT | Mod: 25

## 2023-06-21 PROCEDURE — 81003 URINALYSIS AUTO W/O SCOPE: CPT | Mod: QW

## 2023-06-21 RX ORDER — HYALURONATE SODIUM 20 MG/2 ML
20 SYRINGE (ML) INTRAARTICULAR
Qty: 1 | Refills: 0 | Status: DISCONTINUED | OUTPATIENT
Start: 2023-01-03 | End: 2023-06-21

## 2023-06-21 RX ORDER — PREDNISONE 5 MG/1
5 TABLET ORAL
Qty: 180 | Refills: 0 | Status: DISCONTINUED | COMMUNITY
Start: 2021-11-11 | End: 2023-06-21

## 2023-06-21 RX ORDER — BUDESONIDE 3 MG/1
3 CAPSULE, COATED PELLETS ORAL
Qty: 90 | Refills: 5 | Status: DISCONTINUED | COMMUNITY
Start: 2022-02-06 | End: 2023-06-21

## 2023-06-26 LAB
ALBUMIN SERPL ELPH-MCNC: 4.6 G/DL
ALP BLD-CCNC: 79 U/L
ALT SERPL-CCNC: 32 U/L
ANION GAP SERPL CALC-SCNC: 14 MMOL/L
AST SERPL-CCNC: 25 U/L
BILIRUB SERPL-MCNC: 0.5 MG/DL
BILIRUB UR QL STRIP: NORMAL
BUN SERPL-MCNC: 21 MG/DL
CALCIUM SERPL-MCNC: 10.3 MG/DL
CHLORIDE SERPL-SCNC: 102 MMOL/L
CK SERPL-CCNC: 68 U/L
CLARITY UR: CLEAR
CO2 SERPL-SCNC: 28 MMOL/L
COLLECTION METHOD: NORMAL
CREAT SERPL-MCNC: 0.66 MG/DL
CRP SERPL-MCNC: 6 MG/L
CYTOLOGY CVX/VAG DOC THIN PREP: NORMAL
EGFR: 97 ML/MIN/1.73M2
ENA SS-A AB SER IA-ACNC: <0.2 AL
ENA SS-B AB SER IA-ACNC: <0.2 AL
ERYTHROCYTE [SEDIMENTATION RATE] IN BLOOD BY WESTERGREN METHOD: 23 MM/HR
GLUCOSE SERPL-MCNC: 126 MG/DL
GLUCOSE UR-MCNC: NORMAL
HCG UR QL: 0.2 EU/DL
HGB UR QL STRIP.AUTO: NORMAL
KETONES UR-MCNC: NORMAL
LDH SERPL-CCNC: 215 U/L
LEUKOCYTE ESTERASE UR QL STRIP: NORMAL
NITRITE UR QL STRIP: NORMAL
PH UR STRIP: 5.5
PHOSPHATE SERPL-MCNC: 3.8 MG/DL
POTASSIUM SERPL-SCNC: 3.8 MMOL/L
PROT SERPL-MCNC: 7 G/DL
PROT UR STRIP-MCNC: NORMAL
SODIUM SERPL-SCNC: 143 MMOL/L
SP GR UR STRIP: >=1.03

## 2023-06-26 NOTE — ADDENDUM
[FreeTextEntry1] : I, Guillermo Cecilya, acted solely as a scribe for Dr. Myron I. Kleiner, MD. on 06/21/2023 .

## 2023-06-26 NOTE — CONSULT LETTER
[Dear  ___] : Dear  [unfilled], [Consult Letter:] : I had the pleasure of evaluating your patient, [unfilled]. [Please see my note below.] : Please see my note below. [Consult Closing:] : Thank you very much for allowing me to participate in the care of this patient.  If you have any questions, please do not hesitate to contact me. [Sincerely,] : Sincerely, [FreeTextEntry3] : Ramesh\par Myron I. Kleiner, M.D., FACR \par Chief, Division of Rheumatology\par Department of Medicine \par [facility]  [DrDaniele  ___] : Dr. ORTIZ

## 2023-06-26 NOTE — HISTORY OF PRESENT ILLNESS
[FreeTextEntry1] : 67 year-old woman for follow up office visit regarding her joint symptoms and rheumatic diseases, including osteoarthritis, significant osteopenia, fibromyalgia, chronic low back pain.\par \par Note: Patient last seen in December 2022\par \par Patient feels not well. Pain bilateral hands, shoulders wrists, hips, knees, ankles, and feet. Much sleep disturbance and fatigue, with 3 hours of sleep. Does not exercise. Never started Gabapentin secondary to history of hematochezia as previous side effect. She continues calcium, multivitamins, and vitamin D supplements. IV Zoledronate (last August 16, 2022) without complication or side effect. Patient denies rash or side effects with current medications. Patient is content with current medication regimen.  She did not perform the bone densitometry which I ordered last visit.\par \par PMH\par 2 months ago --fracture right wrist, fall injury, healing, however, still bothers her \par Exacerbation of ulcerative colitis ---was put on prednisone  for 2 months \par Hepatology - blood work, hepatitis "dormant" requiring no further treatment. To perform at home stool study \par GYN - annual visit, "all good" - going for mammography and bone densitometry at  \par Opthalmology - regarding  subconjunctival hemorrhage suggested secondary to BP went to see PCP, says it's secondary to "old age"\par Sleep study--patient states "normal in the past"\par \par \par \par \par

## 2023-06-26 NOTE — ASSESSMENT
[FreeTextEntry1] : Impression: 67 year-old woman for follow up office visit regarding her joint symptoms and rheumatic diseases, including osteoarthritis, significant osteopenia, fibromyalgia, chronic low back pain.\par \par Note: Patient last seen in December 2022\par \par Patient feels not well. Pain bilateral hands, shoulders wrists, hips, knees, ankles, and feet secondary to osteoarthritis and fibromyalgia. On exam, in regard to her bilateral shoulder pain, there is bilateral bicipital tendinitis, bilateral deltoid tendinitis, and also bilateral anserine bursitis contributing to her joint pains. Much sleep disturbance and fatigue secondary to fibromyalgia, with 3 hours of sleep. Does not exercise. Never started Gabapentin secondary to history of hematochezia as previous side effect. She continues calcium, multivitamins, and vitamin D supplements. IV Zoledronate (last August 16, 2022) without complication or side effect for significant osteopenia.  She did not perform the bone densitometry which I ordered last visit.  Patient denies rash or side effects with current medications. \par \par Plan: I reviewed  chart and previous records\par I reviewed her previous lab results with the patient with extensive discussion\par Laboratory tests ordered today-see list below-- with coordination of care\par X-rays ordered – see list below – with coordination of care \par I urged her to perform the bone densitometry which I ordered last visit-- with coordination of care - will call in 7 days to see if the office received results \par Diagnosis and prognosis discussed\par Continue other current medications (other than those changed below)\par I am reluctant to give her an NSAID secondary to her active ulcerative colitis\par Lyrica 25 mg  b.i.d, if no better/side effects 7 days, increase to 50 mg b.i.d.; if no better/side effects after another 7 days, increase to 75 mg b.i.d. (possible side effects explained\par Tylenol 1000 mg t.i.d. p.r.n. or Tylenol 1300 mg b.i.d. p.r.n. (possible side effects explained) \par Artificial tears one drop each eye q.i.d. and p.r.n.(Possible side effects explained)\par Biotin mouthwash/spray q.i.d. and p.r.n.(Possible side effects explained)\par Oral hydration\par Daily exercise starting at 10 minutes per day, gradually increasing to at least 30 minutes per day--reemphasized\par Return visit 3 months\par Total time for this office visit, including face-to-face time and non-face-to-face time, 85 minutes--- including review of the chart and previous records, review of previous lab results with extensive discussion with the patient, ordering lab tests with coordination of care, review of previous imaging reports/x-ray results, ordering of new x-rays with coordination of care, I urged patient to perform bone densitometry which I ordered last visit with extensive discussion, detailed medication history, review of medications going forward with their possible side effects, reviewed the impact of the patient's rheumatic disease on their other medical problems, reviewed the impact of the patient's other medical problems on their rheumatic disease

## 2023-06-28 ENCOUNTER — APPOINTMENT (OUTPATIENT)
Dept: OBGYN | Facility: CLINIC | Age: 68
End: 2023-06-28
Payer: MEDICARE

## 2023-06-28 ENCOUNTER — APPOINTMENT (OUTPATIENT)
Dept: ORTHOPEDIC SURGERY | Facility: CLINIC | Age: 68
End: 2023-06-28
Payer: MEDICARE

## 2023-06-28 ENCOUNTER — ASOB RESULT (OUTPATIENT)
Age: 68
End: 2023-06-28

## 2023-06-28 VITALS
BODY MASS INDEX: 39.75 KG/M2 | HEART RATE: 80 BPM | SYSTOLIC BLOOD PRESSURE: 120 MMHG | DIASTOLIC BLOOD PRESSURE: 70 MMHG | HEIGHT: 62 IN | WEIGHT: 216 LBS

## 2023-06-28 DIAGNOSIS — R93.89 ABNORMAL FINDINGS ON DIAGNOSTIC IMAGING OF OTHER SPECIFIED BODY STRUCTURES: ICD-10-CM

## 2023-06-28 DIAGNOSIS — M25.531 PAIN IN RIGHT WRIST: ICD-10-CM

## 2023-06-28 PROCEDURE — 73110 X-RAY EXAM OF WRIST: CPT | Mod: RT

## 2023-06-28 PROCEDURE — 99024 POSTOP FOLLOW-UP VISIT: CPT

## 2023-06-28 PROCEDURE — 99213 OFFICE O/P EST LOW 20 MIN: CPT

## 2023-06-28 PROCEDURE — 93976 VASCULAR STUDY: CPT

## 2023-06-28 PROCEDURE — 76830 TRANSVAGINAL US NON-OB: CPT

## 2023-06-28 NOTE — HISTORY OF PRESENT ILLNESS
[de-identified] : Patient is a pleasant 67-year-old female who presents today for f/u evaluation of right wrist pain.  She suffered a fall and was seen at [facility] emergency department.  X-rays were obtained and a distal radius fracture was diagnosed.  She was placed into a removable brace at the last visit.  She has seen our practice previously for other issues.  The patient states the pain is made worse with activity and relieved with rest.  Sharp, 3 out of 10

## 2023-06-28 NOTE — PHYSICAL EXAM
[de-identified] : Physical Exam:\par General: Well appearing, no acute distress, A&O\par Neurologic: A&Ox3, No focal deficits\par Head: NCAT without abrasions, lacerations, or ecchymosis to head, face, or scalp\par Respiratory: Equal chest wall expansion bilaterally, no accessory muscle use\par Lymphatic: No lymphadenopathy palpated\par Skin: Warm and dry\par Psychiatric: Normal mood and affect\par \par RUE:\par SILT r/m/u\par Fires AIN/PIN/ulnar\par 2+ radial pulse\par brisk capillary refill\par Moderate tenderness to palpation around the distal radius\par Moderate swelling around the distal radius [de-identified] : Interval 3 view xrays of the right wrist from 6/21 demonstrate a healing distal radius fracture with intra-articular extension.

## 2023-06-28 NOTE — HISTORY OF PRESENT ILLNESS
[FreeTextEntry1] : 66 y/o female here for consult to go over tvs done in office today.  Patient with cyst and thickened endometrium she does have a simple cyst of her right ovary we will have her return for endometrial biopsy and then in 2 to 3 months return for repeat transvaginal sonogram to follow-up since she denies pelvic pain vaginal bleeding or abnormal discharge at this time [TextBox_4] : TVS CONSULT\par LMP:2011 [LMPDate] : 2011 [TextBox_6] : 2011

## 2023-06-28 NOTE — DISCUSSION/SUMMARY
[de-identified] : 67-year-old female with right distal radius fracture.  We discussed that given the fracture alignment, I expect she will do well without operative intervention.  She can start to come out of the brace more often for range of motion exercises and wean out of the brace both at home and with occupational therapy.  As long as she continues to improve she may follow-up on an as-needed basis.\par \par Osteopenia and osteoporosis are significant risk factors for fragility fractures. Given the type of fracture that has occurred, I would highly recommend that the patient followup with their primary care physician to discuss treatment for low bone mineral density. We discussed the benefits of these medications frequently far outweigh the small risks. Their primary care physician can call the office with any specific questions or concerns.\par \par The patient was given the opportunity to ask questions and all questions were answered to their satisfaction.\par \par Dane Price MD\par Orthopaedic Trauma Surgeon\par [facility]\par [facility]\par Director Orthopaedic Trauma, [facility]\par \par \par \par

## 2023-07-03 ENCOUNTER — TRANSCRIPTION ENCOUNTER (OUTPATIENT)
Age: 68
End: 2023-07-03

## 2023-07-03 LAB
CALPROTECTIN FECAL: 102 UG/G
CDIFF BY PCR: NOT DETECTED
GI PCR PANEL: NOT DETECTED

## 2023-07-05 ENCOUNTER — NON-APPOINTMENT (OUTPATIENT)
Age: 68
End: 2023-07-05

## 2023-07-17 ENCOUNTER — APPOINTMENT (OUTPATIENT)
Dept: OBGYN | Facility: CLINIC | Age: 68
End: 2023-07-17
Payer: MEDICARE

## 2023-07-17 DIAGNOSIS — Z01.419 ENCOUNTER FOR GYNECOLOGICAL EXAMINATION (GENERAL) (ROUTINE) W/OUT ABNORMAL FINDINGS: ICD-10-CM

## 2023-07-17 PROCEDURE — 58100 BIOPSY OF UTERUS LINING: CPT

## 2023-07-17 NOTE — PROCEDURE
[Endometrial Biopsy] : Endometrial biopsy [Thickened Endometrium] : thickened endometrium [Ibuprofen ___ mg] : ibuprofen [unfilled] ~Umg [Time out performed] : Pre-procedure time out performed.  Patient's name, date of birth and procedure confirmed. [Consent Obtained] : Consent obtained [Risks] : risks [Benefits] : benefits [Alternatives] : alternatives [Patient] : patient [Infection] : infection [Bleeding] : bleeding [Allergic Reaction] : allergic reaction [Uterine Perforation] : uterine perforation [Pain] : pain [Negative] : negative pregnancy test [Betadine] : Betadine [None] : none [Tenaculum] : Tenaculum [Easy Passage] : Easy passage [Sounded to ___ cm] : sounded to [unfilled] ~Ucm [Anteverted] : anteverted [Scant] : scant [Specimen Collected] : collected [Sent to Pathology] : placed in buffered formalin and sent for pathology [Tolerated Well] : Patient tolerated the procedure well [No Complications] : No complications [LMPDate] : 2011 [de-identified] : Endometrial Pipelle [de-identified] : Directions given nothing per vagina for 72 hours Motrin for pain

## 2023-07-19 ENCOUNTER — APPOINTMENT (OUTPATIENT)
Dept: UROGYNECOLOGY | Facility: CLINIC | Age: 68
End: 2023-07-19
Payer: MEDICARE

## 2023-07-19 ENCOUNTER — RESULT CHARGE (OUTPATIENT)
Age: 68
End: 2023-07-19

## 2023-07-19 VITALS
SYSTOLIC BLOOD PRESSURE: 135 MMHG | DIASTOLIC BLOOD PRESSURE: 65 MMHG | WEIGHT: 215 LBS | BODY MASS INDEX: 39.56 KG/M2 | HEIGHT: 62 IN

## 2023-07-19 DIAGNOSIS — R39.15 URGENCY OF URINATION: ICD-10-CM

## 2023-07-19 DIAGNOSIS — K59.00 CONSTIPATION, UNSPECIFIED: ICD-10-CM

## 2023-07-19 DIAGNOSIS — R35.1 NOCTURIA: ICD-10-CM

## 2023-07-19 DIAGNOSIS — R32 UNSPECIFIED URINARY INCONTINENCE: ICD-10-CM

## 2023-07-19 DIAGNOSIS — Z82.49 FAMILY HISTORY OF ISCHEMIC HEART DISEASE AND OTHER DISEASES OF THE CIRCULATORY SYSTEM: ICD-10-CM

## 2023-07-19 DIAGNOSIS — M53.9 DORSOPATHY, UNSPECIFIED: ICD-10-CM

## 2023-07-19 DIAGNOSIS — R35.0 FREQUENCY OF MICTURITION: ICD-10-CM

## 2023-07-19 DIAGNOSIS — R15.9 FULL INCONTINENCE OF FECES: ICD-10-CM

## 2023-07-19 LAB
BILIRUB UR QL STRIP: NEGATIVE
CLARITY UR: CLEAR
COLLECTION METHOD: NORMAL
GLUCOSE UR-MCNC: NEGATIVE
HCG UR QL: 0.2 EU/DL
HGB UR QL STRIP.AUTO: NEGATIVE
KETONES UR-MCNC: NEGATIVE
LEUKOCYTE ESTERASE UR QL STRIP: NEGATIVE
NITRITE UR QL STRIP: NEGATIVE
PH UR STRIP: 5
PROT UR STRIP-MCNC: NEGATIVE
SP GR UR STRIP: 1.03

## 2023-07-19 PROCEDURE — 99214 OFFICE O/P EST MOD 30 MIN: CPT | Mod: 25

## 2023-07-19 PROCEDURE — 51701 INSERT BLADDER CATHETER: CPT | Mod: 59

## 2023-07-19 PROCEDURE — 99204 OFFICE O/P NEW MOD 45 MIN: CPT | Mod: 25

## 2023-07-19 PROCEDURE — 81003 URINALYSIS AUTO W/O SCOPE: CPT | Mod: QW

## 2023-07-19 RX ORDER — HYDROCHLOROTHIAZIDE 12.5 MG/1
12.5 CAPSULE ORAL
Refills: 0 | Status: ACTIVE | COMMUNITY

## 2023-07-19 NOTE — OB HISTORY
[Vaginal ___] : [unfilled] vaginal delivery(s) [Definite ___ (Date)] : the last menstrual period was [unfilled] [Last Pap Smear ___] : date of last pap smear was on [unfilled] [Abnormal Pap Smear] : normal pap smear [Sexually Active] : is not sexually active

## 2023-07-19 NOTE — ASSESSMENT
[FreeTextEntry1] : 68 yo P4 with GINETTE. On exam, CST neg, +urethral hypermobility, PVR normal, dip neg, no clinically signif POP on POPQ - only a mild stage I-2 cystocele. The etiology of DIVYA was discussed. Management options including observation, behavioral modifications, medication, pessary, Impressa insert, periurethral bulking via cystoscopy, and surgery with midurethral sling were reviewed. Other anti-incontinence procedures such as a Khan or fascial sling also reviewed. The etiology of OAB was discussed. Management options including observation, behavioral modifications (dietary changes, monitoring fluid intake, bladder training, timed voids, use of pads/protective garments), kegels, PT, medications, PTNS, SNS, and bladder Botox were all reviewed. She will think about options and in the meantime try to obtain UDS report for me to avoid having to repeat it if positive. She understood. Weight loss behavioral modification could help as well. All ques answered, RTO prn.\par \par

## 2023-07-19 NOTE — PHYSICAL EXAM
[Chaperone Present] : A chaperone was present in the examining room during all aspects of the physical examination [No Acute Distress] : in no acute distress [Oriented x3] : oriented to person, place, and time [Soft, Nontender] : the abdomen was soft and nontender [None] : no CVA tenderness [Labia Majora] : were normal [Labia Minora] : were normal [Bartholin's Gland] : both Bartholin's glands were normal  [Normal Appearance] : general appearance was normal [No Bleeding] : there was no active vaginal bleeding [2] : 2 [Aa ____] : Aa [unfilled] [Ba ____] : Ba [unfilled] [C ____] : C [unfilled] [GH ____] : GH [unfilled] [PB ____] : PB [unfilled] [TVL ____] : TVL  [unfilled] [Ap ____] : Ap [unfilled] [Bp ____] : Bp [unfilled] [D ____] : D [unfilled] [] : II [Normal] : normal [Soft] :  the cervix was soft [Post Void Residual ____ml] : post void residual was [unfilled] ml [Exam Deferred] : was deferred [Tenderness] : ~T no ~M abdominal tenderness observed [Distended] : not distended [FreeTextEntry3] : empty supine cst neg, +urethral hypermobility [FreeTextEntry4] : no mass/lesion/cyst [de-identified] : limited exam, nontender

## 2023-07-19 NOTE — HISTORY OF PRESENT ILLNESS
[FreeTextEntry1] : Over 10 years of bothersome leakage of urine requiring multiple pad changes daily, equally bothersome with urgency as well as cough sneeze positional changes. No hematuria or dysuria, no flank pain, no incomplete emptying, nocturia 1-2 but also takes diuretic at night before bed. No bugle or pressure from vagina, no vaginal bleeding and normal caliber bowel movements. No intervention for this issue as of yet. She believes she had a UDS test years ago but unsure where.\par \par 24 hour VD: 10v3L, water intake 30 oz and coffee 16 oz daily\par \par PMH herniated disc, arthritis, HTN, UC, dry eyes, fibromyalgia, BMI 39\par PSH knee, cholecystectomy, LSC ov cystectomy\par Not a smoker\par NKDA\par \par 2023 labs bun/cr 21/0.7, gfr 97, lfts wnl\par \par CT 2021 2 cm R renal cyst, no hydronephrosis or calculi, bladder wnl, R ov 3.5 simple cyst, evidence of mild submucosal fatty proliferation involving rectum and sigmoid as seen in chronic colitis\par \par 2023 sono normal gastric emptying with no evid of gastroparesis

## 2023-07-19 NOTE — PROCEDURE
[Straight Catheterization] : insertion of a straight catheter [Stress Incontinence] : stress incontinence [Urgent Incontinence] : urgent incontinence [Urinary Frequency] : urinary frequency [Patient] : the patient [___ Fr Straight Tip] : a [unfilled] in [place] straight tip catheter [None] : there were no complications with the catheter insertion [Clear] : clear [No Complications] : no complications [Tolerated Well] : the patient tolerated the procedure well [Post procedure instructions and information given] : Post procedure instructions and information were given and reviewed with patient. [1] : 1 [FreeTextEntry1] : cathed to obtain pvr and uncontam specimen

## 2023-08-09 RX ORDER — PREGABALIN 25 MG/1
25 CAPSULE ORAL
Qty: 60 | Refills: 3 | Status: DISCONTINUED | COMMUNITY
Start: 2023-06-26 | End: 2023-08-09

## 2023-08-25 ENCOUNTER — APPOINTMENT (OUTPATIENT)
Dept: RHEUMATOLOGY | Facility: CLINIC | Age: 68
End: 2023-08-25
Payer: MEDICARE

## 2023-08-25 VITALS
OXYGEN SATURATION: 95 % | RESPIRATION RATE: 17 BRPM | TEMPERATURE: 97.9 F | HEART RATE: 75 BPM | DIASTOLIC BLOOD PRESSURE: 77 MMHG | SYSTOLIC BLOOD PRESSURE: 120 MMHG

## 2023-08-25 VITALS
HEART RATE: 67 BPM | RESPIRATION RATE: 17 BRPM | DIASTOLIC BLOOD PRESSURE: 72 MMHG | OXYGEN SATURATION: 95 % | SYSTOLIC BLOOD PRESSURE: 109 MMHG

## 2023-08-25 PROCEDURE — 96374 THER/PROPH/DIAG INJ IV PUSH: CPT

## 2023-08-25 RX ORDER — ZOLEDRONIC ACID 5 MG/100ML
5 INJECTION INTRAVENOUS
Qty: 0 | Refills: 0 | Status: COMPLETED | OUTPATIENT
Start: 2023-08-13

## 2023-08-25 NOTE — HISTORY OF PRESENT ILLNESS
[Denies] : Denies [No] : No [Yes] : Yes [Declined] : Declined [Informed consent documented in EHR.] : Informed consent documented in EHR. [Bone Density/DEXA] : Bone Density/DEXA [Left upper extremity] : Left upper extremity [24g] : 24g [Start Time: ___] : Medication Start Time: [unfilled] [End Time: ___] : Medication End Time: [unfilled] [IV discontinued. Intact. No signs or symptoms of IV complications noted. Time: ___] : IV discontinued. Intact. No signs or symptoms of IV complications noted. Time: [unfilled] [Patient  instructed to seek medical attention with signs and symptoms of adverse effects] : Patient  instructed to seek medical attention with signs and symptoms of adverse effects [Patient left unit in no acute distress] : Patient left unit in no acute distress [Medications administered as ordered and tolerated well.] : Medications administered as ordered and tolerated well.

## 2023-09-05 ENCOUNTER — APPOINTMENT (OUTPATIENT)
Dept: VASCULAR SURGERY | Facility: CLINIC | Age: 68
End: 2023-09-05
Payer: MEDICARE

## 2023-09-05 VITALS
TEMPERATURE: 97.9 F | SYSTOLIC BLOOD PRESSURE: 120 MMHG | RESPIRATION RATE: 16 BRPM | OXYGEN SATURATION: 96 % | HEART RATE: 73 BPM | BODY MASS INDEX: 41.72 KG/M2 | HEIGHT: 61 IN | DIASTOLIC BLOOD PRESSURE: 72 MMHG | WEIGHT: 221 LBS

## 2023-09-05 DIAGNOSIS — I83.819 VARICOSE VEINS OF UNSPECIFIED LOWER EXTREMITY WITH PAIN: ICD-10-CM

## 2023-09-05 PROCEDURE — 99203 OFFICE O/P NEW LOW 30 MIN: CPT

## 2023-09-05 PROCEDURE — 93970 EXTREMITY STUDY: CPT

## 2023-09-05 NOTE — PHYSICAL EXAM
[2+] : left 2+ [de-identified] : Appears well, overweight [de-identified] :  bilateral lower extremity with edema involving the foot ankle dorsum of foot and calf scattered varicosities throughout both lower extremities left worse than right.

## 2023-09-05 NOTE — HISTORY OF PRESENT ILLNESS
[FreeTextEntry1] : 68-year-old female.  Presents with chronic swelling of bilateral lower extremities.  She recently went on a 12-day trip to [place].  Following her trip there she began to have progressive and progressively worsening swelling of both lower extremities left worse than right.  Her edema since has improved but has not returned to baseline.  She went to the emergency room at [facility] and underwent venous ultrasound to rule out deep vein thrombosis which was negative.  She is wearing standard 2030 mm compression socks which is helping to improve her symptoms.

## 2023-09-05 NOTE — ASSESSMENT
[FreeTextEntry1] : 68-year-old female with some element of superficial venous disease as well as chronic lymphedema.  Likely her swelling is multifactorial with some contribution from her superficial venous reflux.  Right lower extremity shows SSV reflux with left lower extremity showing anterior greater saphenous vein reflux.  We did discuss possible endovenous radiofrequency ablation for treatment of her her swelling.  I did explain that this will not completely resolve her swelling.  I advised her to exercise, continue to wear compression stockings during the day and take them off in the evening.  Patient was previously wearing her compression stockings to bed instead.  She will consider endovenous treatment.  She will call the office.  I reassured her that this does not mean she has blood clots.

## 2023-09-15 ASSESSMENT — KOOS JR
HOW SEVERE IS YOUR KNEE STIFFNESS AFTER FIRST WAKING IN MORNING: MILD
TWISING OR PIVOTING ON KNEE: MILD
IMPORTED FORM: YES
IMPORTED LATERALITY: RIGHT
GOING UP OR DOWN STAIRS: MILD
RISING FROM SITTING: MODERATE
KOOS JR RAW SCORE: 6
IMPORTED KOOS JR SCORE: 70.7
BENDING TO THE FLOOR TO PICK UP OBJECT: MILD

## 2023-09-19 NOTE — PATIENT PROFILE ADULT. - NS PRO PT RIGHT SUPPORT PERSON
Anesthesia Post Evaluation    Patient: Maricel Amezquita    Procedure(s) Performed: Procedure(s) (LRB):  ORIF, FINGER (Left)    Final Anesthesia Type: general      Patient location during evaluation: PACU  Patient participation: Yes- Able to Participate  Level of consciousness: awake and alert  Post-procedure vital signs: reviewed and stable  Pain management: adequate  Airway patency: patent    PONV status at discharge: No PONV  Anesthetic complications: no      Cardiovascular status: blood pressure returned to baseline and hemodynamically stable  Respiratory status: unassisted  Hydration status: euvolemic  Follow-up not needed.          Vitals Value Taken Time     09/19/23 1623     09/19/23 1623     09/19/23 1623     09/19/23 1623     09/19/23 1623         No case tracking events are documented in the log.      Pain/Vinayak Score: Vinayak Score: 10 (9/19/2023  1:45 PM)         same name as above

## 2023-09-26 RX ORDER — OMEPRAZOLE 40 MG/1
40 CAPSULE, DELAYED RELEASE ORAL
Qty: 90 | Refills: 2 | Status: ACTIVE | COMMUNITY
Start: 2021-11-24 | End: 1900-01-01

## 2023-10-22 ENCOUNTER — RX RENEWAL (OUTPATIENT)
Age: 68
End: 2023-10-22

## 2023-11-09 ENCOUNTER — APPOINTMENT (OUTPATIENT)
Dept: RHEUMATOLOGY | Facility: CLINIC | Age: 68
End: 2023-11-09

## 2023-12-08 ENCOUNTER — APPOINTMENT (OUTPATIENT)
Dept: GASTROENTEROLOGY | Facility: CLINIC | Age: 68
End: 2023-12-08
Payer: MEDICARE

## 2023-12-08 VITALS
BODY MASS INDEX: 40.59 KG/M2 | SYSTOLIC BLOOD PRESSURE: 124 MMHG | HEART RATE: 80 BPM | OXYGEN SATURATION: 98 % | WEIGHT: 215 LBS | RESPIRATION RATE: 16 BRPM | DIASTOLIC BLOOD PRESSURE: 78 MMHG | HEIGHT: 61 IN

## 2023-12-08 PROCEDURE — 99213 OFFICE O/P EST LOW 20 MIN: CPT

## 2023-12-11 ENCOUNTER — APPOINTMENT (OUTPATIENT)
Dept: ORTHOPEDIC SURGERY | Facility: CLINIC | Age: 68
End: 2023-12-11

## 2023-12-12 ENCOUNTER — APPOINTMENT (OUTPATIENT)
Dept: ORTHOPEDIC SURGERY | Facility: CLINIC | Age: 68
End: 2023-12-12
Payer: MEDICARE

## 2023-12-12 VITALS
HEIGHT: 61 IN | BODY MASS INDEX: 40.59 KG/M2 | SYSTOLIC BLOOD PRESSURE: 145 MMHG | WEIGHT: 215 LBS | DIASTOLIC BLOOD PRESSURE: 78 MMHG | HEART RATE: 69 BPM

## 2023-12-12 DIAGNOSIS — M17.12 UNILATERAL PRIMARY OSTEOARTHRITIS, LEFT KNEE: ICD-10-CM

## 2023-12-12 PROCEDURE — 99214 OFFICE O/P EST MOD 30 MIN: CPT

## 2023-12-12 PROCEDURE — 73502 X-RAY EXAM HIP UNI 2-3 VIEWS: CPT

## 2023-12-17 NOTE — PATIENT PROFILE ADULT. - BRADEN SCORE (IF 18 OR LESS ACTIVATE SKIN INJURY RISK INCREASED GUIDELINE), MLM
Lali Mcgill was seen and treated in our emergency department on 12/17/2023.  He may return to school on 12/19/2023.  PLEASE EXCUSE LALI FROM SCHOOL PREVIOUS WEEK AND THRU TUESDAY    If you have any questions or concerns, please don't hesitate to call.      Nitza Hou PA-C
21

## 2023-12-26 RX ORDER — HYALURONATE SODIUM 30 MG/2 ML
30 SYRINGE (ML) INTRAARTICULAR
Qty: 3 | Refills: 0 | Status: ACTIVE | OUTPATIENT
Start: 2023-12-18

## 2024-01-02 ENCOUNTER — APPOINTMENT (OUTPATIENT)
Dept: ORTHOPEDIC SURGERY | Facility: CLINIC | Age: 69
End: 2024-01-02
Payer: MEDICARE

## 2024-01-02 DIAGNOSIS — M18.9 OSTEOARTHRITIS OF FIRST CARPOMETACARPAL JOINT, UNSPECIFIED: ICD-10-CM

## 2024-01-02 DIAGNOSIS — M18.52: ICD-10-CM

## 2024-01-02 PROCEDURE — 73110 X-RAY EXAM OF WRIST: CPT | Mod: 26,LT

## 2024-01-02 PROCEDURE — 99213 OFFICE O/P EST LOW 20 MIN: CPT

## 2024-01-02 NOTE — REASON FOR VISIT
[Initial Visit] : an initial visit for [Hand Pain] : hand pain [Wrist Pain] : wrist pain [FreeTextEntry2] : LEFT

## 2024-01-02 NOTE — HISTORY OF PRESENT ILLNESS
[FreeTextEntry1] : Divine is a 68-year-old female well-known to the practice status post previous right wrist distal radius fracture treated nonoperatively that presents today for initial evaluation of a new complaint of left wrist pain which began approximately 2 to 3 weeks ago.  No specific injury. She reports intermittent, moderate, dull aching pain localized over the radial aspect of the wrist and hand.  Exacerbated with gripping and lifting.  She denies any stiffness, mechanical symptoms, instability, or paresthesia. Treatment has consisted of activity modification and the use of Tylenol without improvement. Patient does have a past medical history significant for ulcerative colitis.

## 2024-01-02 NOTE — PHYSICAL EXAM
[de-identified] : GENERAL APPEARANCE: Well nourished and hydrated, pleasant, alert, and oriented x 3. Appears their stated age.  HEENT: Normocephalic, extraocular eye motion intact. Nasal septum midline. Oral cavity clear. External auditory canal clear.  RESPIRATORY: Breath sounds clear and audible in all lobes. No wheezing, No accessory muscle use. CARDIOVASCULAR: No apparent abnormalities. No lower leg edema. No varicosities. Pedal pulses are palpable. NEUROLOGIC: Sensation is normal, no muscle weakness in the upper or lower extremities. DERMATOLOGIC: No apparent skin lesions, moist, warm, no rash. SPINE: Cervical spine appears normal and moves freely; thoracic spine appears normal and moves freely; lumbosacral spine appears normal and moves freely, normal, nontender. PSYCHIATRIC: Oriented to person, place, and time, insight and judgement were intact and the affect was normal.  LEFT wrist/hand exam shows no swelling.  First dorsal extensor compartment and basal joint tenderness to palpation. ROM is 70 degrees of flexion, 30 degrees of extension.  Full finger range of motion. No gross instability.  Strength: 5/5 flexion, 5/5 extension Specialized tests:  Negative Watsons, negative median Nerve Tinels, negative carpal Tunnel Compression test, positive Finkelsteins.  Right wrist demonstrates full, pain-free, stable ROM and strength.   [de-identified] : X-rays were taken today of the patient's left wrist, AP, lateral, oblique views.  No evidence of acute fracture or dislocation.  There is moderate to advanced arthritic change noted at the CMC joint of the left thumb.

## 2024-01-08 ENCOUNTER — APPOINTMENT (OUTPATIENT)
Dept: ORTHOPEDIC SURGERY | Facility: CLINIC | Age: 69
End: 2024-01-08
Payer: MEDICARE

## 2024-01-08 PROCEDURE — 99213 OFFICE O/P EST LOW 20 MIN: CPT | Mod: 25

## 2024-01-08 PROCEDURE — 20610 DRAIN/INJ JOINT/BURSA W/O US: CPT | Mod: LT

## 2024-01-08 NOTE — PROCEDURE
[de-identified] : Orthovisc # 1 Left knee Discussed at length with the patient the planned Orthovisc injection. The risks, benefits, convalescence and alternatives were reviewed. The possible side effects discussed included but were not limited to: pain, swelling, heat and redness. There symptoms are generally mild but if they are extensive then contact the office. Giving pain relievers by mouth such as NSAIDs or Tylenol can generally treat the reactions to Orthovisc. Rare cases of infection have been noted. Rash, hives and itching may occur post injection. If you have muscle pain or cramps, flushing and or swelling of the face, rapid heart beat, nausea, dizziness, fever, chills, headache, difficulty breathing, swelling in the arms or legs, or have a prickly feeling of your skin, contact a health care provider immediately.  Following this discussion, the knee was prepped with betadine and under sterile condition the Orthoviscinjection was performed with a 22 gauge needle. The needle was introduced into the joint, aspiration was performed to ensure intra-articular placement and the medication was injected. Upon withdrawal of the needle the site was cleaned with alcohol and a bandaid applied. The patient tolerated the injection well and there were no adverse effects. Post injection instructions included no strenuous activity for 24 hours, cryotherapy and if there are any adverse effects to contact the office.

## 2024-01-08 NOTE — DISCUSSION/SUMMARY
[Medication Risks Reviewed] : Medication risks reviewed [de-identified] : Status post Orthovisc injection 1 of 3 to left knee.  Tolerated well.  Will follow-up in 1 week for repeat injection

## 2024-01-08 NOTE — PHYSICAL EXAM
[de-identified] : GENERAL APPEARANCE: Well nourished and hydrated, pleasant, alert, and oriented x 3. Appears their stated age. \par  HEENT: Normocephalic, extraocular eye motion intact. Nasal septum midline. Oral cavity clear. External auditory canal clear. \par  RESPIRATORY: Breath sounds clear and audible in all lobes. No wheezing, No accessory muscle use.\par  CARDIOVASCULAR: No apparent abnormalities. No lower leg edema. No varicosities. Pedal pulses are palpable.\par  NEUROLOGIC: Sensation is normal, no muscle weakness in the upper or lower extremities.\par  DERMATOLOGIC: No apparent skin lesions, moist, warm, no rash.\par  SPINE: Cervical spine appears normal and moves freely; thoracic spine appears normal and moves freely; lumbosacral spine appears normal and moves freely, normal, nontender.\par  MUSCULOSKELETAL: Hands, wrists, and elbows are normal and move freely, shoulders are normal and move freely. \par  Psychiatric: Oriented to person, place, and time, insight and judgement were intact and the affect was normal. \par  Musculoskeletal:. Left knee exam shows mild effusion, ROM is 5-1 20 degrees, no instability, no pain with Debra, medial joint line tenderness. \par  5/5 motor strength in bilateral lower extremities. Sensory: Intact in bilateral lower extremities. DTRs: Biceps, brachioradialis, triceps, patellar, ankle and plantar 2+ and symmetric bilaterally. Pulses: dorsalis pedis, posterior tibial, femoral, popliteal, and radial 2+ and symmetric bilaterally. \par  Constitutional: Alert and in no acute distress, but well-appearing.

## 2024-01-08 NOTE — HISTORY OF PRESENT ILLNESS
[de-identified] : Patient is a 68-year-old female here today for follow-up of her left knee osteoarthritis.  She still experiencing aching pain and dysfunction.  Denies any falls or trauma.

## 2024-01-09 ENCOUNTER — APPOINTMENT (OUTPATIENT)
Dept: RHEUMATOLOGY | Facility: CLINIC | Age: 69
End: 2024-01-09
Payer: MEDICARE

## 2024-01-09 VITALS
TEMPERATURE: 97.7 F | SYSTOLIC BLOOD PRESSURE: 118 MMHG | OXYGEN SATURATION: 95 % | HEART RATE: 76 BPM | DIASTOLIC BLOOD PRESSURE: 72 MMHG

## 2024-01-09 DIAGNOSIS — G89.29 LOW BACK PAIN, UNSPECIFIED: ICD-10-CM

## 2024-01-09 DIAGNOSIS — M65.4 RADIAL STYLOID TENOSYNOVITIS [DE QUERVAIN]: ICD-10-CM

## 2024-01-09 DIAGNOSIS — M54.50 LOW BACK PAIN, UNSPECIFIED: ICD-10-CM

## 2024-01-09 DIAGNOSIS — M70.50 OTHER BURSITIS OF KNEE, UNSPECIFIED KNEE: ICD-10-CM

## 2024-01-09 DIAGNOSIS — G89.29 CERVICALGIA: ICD-10-CM

## 2024-01-09 DIAGNOSIS — M54.2 CERVICALGIA: ICD-10-CM

## 2024-01-09 DIAGNOSIS — R10.9 UNSPECIFIED ABDOMINAL PAIN: ICD-10-CM

## 2024-01-09 PROCEDURE — 99215 OFFICE O/P EST HI 40 MIN: CPT

## 2024-01-09 PROCEDURE — G2212 PROLONG OUTPT/OFFICE VIS: CPT

## 2024-01-09 PROCEDURE — G2211 COMPLEX E/M VISIT ADD ON: CPT

## 2024-01-12 ENCOUNTER — NON-APPOINTMENT (OUTPATIENT)
Age: 69
End: 2024-01-12

## 2024-01-13 NOTE — CONSULT LETTER
Patient is a 46 years old M pmhx lung cancer with mets to brain and bones now on remission presents to the ED for evaluation after a fall at 2am today.  As per patient lives in a shelter and loss his balance falling onto right side and hitting right side of face to heater.  Ambulance was called and patient was initially evaluated at [facility] and d/c home sts he felt he needed a second opinion and came to the ED for re-evaluation.  Patient denies headache, sts mild pain to right side of face.  No nausea no vomiting no neck pain, no cp no shortness of breath, no abd pain, no LOC, no anticoagulation. Patient sts he stayed seated after the incident until ambulance arrived. [Dear  ___] : Dear  [unfilled], [Consult Letter:] : I had the pleasure of evaluating your patient, [unfilled]. [Please see my note below.] : Please see my note below. [Consult Closing:] : Thank you very much for allowing me to participate in the care of this patient.  If you have any questions, please do not hesitate to contact me. [Sincerely,] : Sincerely, [DrDaniele  ___] : Dr. ORTIZ [FreeTextEntry3] : Ramesh\par  Myron I. Kleiner, M.D., FACR \par  Chief, Division of Rheumatology\par  Department of Medicine \par  [facility]

## 2024-01-13 NOTE — PHYSICAL EXAM
[General Appearance - Alert] : alert [General Appearance - In No Acute Distress] : in no acute distress [General Appearance - Well Nourished] : well nourished [General Appearance - Well Developed] : well developed [General Appearance - Well-Appearing] : healthy appearing [Sclera] : the sclera and conjunctiva were normal [PERRL With Normal Accommodation] : pupils were equal in size, round, and reactive to light [Extraocular Movements] : extraocular movements were intact [Outer Ear] : the ears and nose were normal in appearance [Neck Appearance] : the appearance of the neck was normal [Lungs Percussion] : the lungs were normal to percussion [Heart Rate And Rhythm] : heart rate was normal and rhythm regular [Edema] : there was no peripheral edema [Abdomen Soft] : soft [Abdomen Tenderness] : non-tender [Abdomen Mass (___ Cm)] : no abdominal mass palpated [Cervical Lymph Nodes Enlarged Posterior Bilaterally] : posterior cervical [Cervical Lymph Nodes Enlarged Anterior Bilaterally] : anterior cervical [Supraclavicular Lymph Nodes Enlarged Bilaterally] : supraclavicular [Axillary Lymph Nodes Enlarged Bilaterally] : axillary [No CVA Tenderness] : no ~M costovertebral angle tenderness [No Spinal Tenderness] : no spinal tenderness [Skin Color & Pigmentation] : normal skin color and pigmentation [Skin Turgor] : normal skin turgor [] : no rash [Cranial Nerves] : cranial nerves 2-12 were intact [Deep Tendon Reflexes (DTR)] : deep tendon reflexes were 2+ and symmetric [Sensation] : the sensory exam was normal to light touch and pinprick [Motor Exam] : the motor exam was normal [No Focal Deficits] : no focal deficits [Oriented To Time, Place, And Person] : oriented to person, place, and time [Impaired Insight] : insight and judgment were intact [Affect] : the affect was normal [Mood] : the mood was normal [Jugular Venous Distention Increased] : there was no jugular-venous distention [Neck Cervical Mass (___cm)] : no neck mass was observed [Thyroid Diffuse Enlargement] : the thyroid was not enlarged [FreeTextEntry1] : Strength- 5/5

## 2024-01-13 NOTE — HISTORY OF PRESENT ILLNESS
[FreeTextEntry1] : 68 year-old woman for follow up office visit regarding her joint symptoms and rheumatic diseases, including osteoarthritis, significant osteopenia, fibromyalgia, chronic low back pain.  Note: Patient last seen June 2023   Patient describes "Aches and pains everywhere" including bilateral elbows, base of both thumbs, hips, left knee, ankles, and dorsum right foot.  She has persistent chronic low back pain without radiation.  Denies recent sleep disturbance and fatigue. Past 2 weeks constant right flank pain especially with movement of the trunk - denies recent urinary symptoms.  No previous history of renal stones.  She takes Tylenol 500 mg daily as needed with some relief.  1 day ago Orthopedic performed IA viscosupplementation Euflexxa left knee q.weekly x3 - previous IA viscosupplementation 1 year ago provided little relief. She continues calcium, multivitamins, and vitamin D supplements. IV Zoledronate (last August 25, 2023) without complication or side effect. Tylenol 500 mg q.d p.r.n. with some relief. Patient denies rash or side effects with current medications. Patient is content with current medication regimen.    PMH Mentions swelling of the bilateral feet by the end of the day- for 7 months - PCP sent her to vascular surgeon who diagnosed varicose veins and water retention.

## 2024-01-13 NOTE — ADDENDUM
[FreeTextEntry1] : I, Guillermo Cecilya, acted solely as a scribe for Dr. Myron I. Kleiner, MD. on 01/09/2024 .

## 2024-01-13 NOTE — ASSESSMENT
[FreeTextEntry1] : Impression: 68 year-old woman for follow up office visit regarding her joint symptoms and rheumatic diseases, including osteoarthritis, significant osteopenia, fibromyalgia, chronic low back pain.  Note: Patient last seen June 2023   Patient describes "Aches and pains everywhere" including bilateral elbows, base of both thumbs, hips, left knee, ankles, and dorsum right foot secondary to osteoarthritis and fibromyalgia despite denying recent sleep disturbance and fatigue.  She also has persistent chronic low back pain without radiation.  On exam patient has bilateral bicipital tendinitis, bilateral deltoid tendinitis, tenderness left 1st CMC joint, left de Quervain's tenosynovitis, and bilateral anserine bursitis all contributing to her recent joint pains. Past 2 weeks constant right flank pain especially with movement of the trunk - denies recent urinary symptoms and previous history of renal stone. On exam patient has dry eyes - I will continue to monitor for Sjogren's syndrome. She continues calcium, multivitamins, and vitamin D supplements,  IV Zoledronate (last August 25, 2023) without complication or side effect for significant osteopenia. Bone densitometry results revealed significant osteopenia, with extensive discussion. Recent X-rays reveal osteoarthritis bilateral 1st CMC/IP joints, left knee, bilateral feet with heels spurs, and C-spine, with extensive discussion. Recent laboratory tests results reveal hyperglycemia, otherwise normal, with extensive discussion. Patient denies rash or side effects with current medications. Patient is content with current medication regimen.   Plan: I reviewed chart and previous records I reviewed her previous lab results with the patient with extensive discussion I reviewed recent Bone densitometry results with patient with extensive discussion including my analysis of raw data.  I reviewed recent x-ray results with patient with extensive discussion  Laboratory tests ordered today-see list below-- with coordination of care X-rays ordered--see list below--with coordination of care Abdominal/Pelvic Sonogram ordered - with coordination of care  Diagnosis and prognosis discussed Continue other current medications (other than those changed below) Celebrex 200 mg q.d end of breakfast (Possible side effects explained including cardiovascular risk/MI/CVA) - Patient declined secondary to fear of side effects--extensive discussion Increase Tylenol 1000 mg t.i.d. p.r.n. or Tylenol 1300 mg b.i.d. p.r.n. (possible side effects explained) Artificial tears one drop each eye q.i.d. and p.r.n.(Possible side effects explained) Biotin mouthwash/spray q.i.d. and p.r.n.(Possible side effects explained) Oral hydration Daily exercise starting at 10 minutes per day, gradually increasing to at least 30 minutes per day--reemphasized Return visit 3 months All questions and concerns were addressed  Total time for this office visit, including face-to-face time and non-face-to-face time, 85 minutes--- including review of the chart and previous records, detailed review of her medical history, review of previous lab results with extensive discussion with the patient, ordering lab tests with coordination of care, review of recent imaging reports/x-ray results with extensive discussion with the patient, ordering of new x-rays with coordination of care, ordering of new abdominal/pelvic sonogram with coordination of care, detailed medication history, extensive discussion regarding starting an NSAID with Celebrex but she declined secondary to fear of side effects, review of medications going forward with their possible side effects, reviewed the impact of the patient's rheumatic disease on their other medical problems, reviewed the impact of the patient's other medical problems on their rheumatic disease

## 2024-01-15 LAB
25(OH)D3 SERPL-MCNC: 53 NG/ML
ALBUMIN MFR SERPL ELPH: 59.8 %
ALBUMIN SERPL ELPH-MCNC: 4.3 G/DL
ALBUMIN SERPL-MCNC: 4.2 G/DL
ALBUMIN/GLOB SERPL: 1.5 RATIO
ALP BLD-CCNC: 64 U/L
ALPHA1 GLOB MFR SERPL ELPH: 4.7 %
ALPHA1 GLOB SERPL ELPH-MCNC: 0.3 G/DL
ALPHA2 GLOB MFR SERPL ELPH: 12.3 %
ALPHA2 GLOB SERPL ELPH-MCNC: 0.9 G/DL
ALT SERPL-CCNC: 29 U/L
ANION GAP SERPL CALC-SCNC: 15 MMOL/L
APPEARANCE: CLEAR
AST SERPL-CCNC: 23 U/L
B-GLOBULIN MFR SERPL ELPH: 11.9 %
B-GLOBULIN SERPL ELPH-MCNC: 0.8 G/DL
BACTERIA: ABNORMAL /HPF
BASOPHILS # BLD AUTO: 0.04 K/UL
BASOPHILS NFR BLD AUTO: 0.8 %
BILIRUB SERPL-MCNC: 0.4 MG/DL
BILIRUBIN URINE: NEGATIVE
BLOOD URINE: NEGATIVE
BUN SERPL-MCNC: 20 MG/DL
CALCIUM SERPL-MCNC: 9.6 MG/DL
CALCIUM SERPL-MCNC: 9.6 MG/DL
CHLORIDE SERPL-SCNC: 102 MMOL/L
CK SERPL-CCNC: 87 U/L
CO2 SERPL-SCNC: 24 MMOL/L
COLOR: NORMAL
CREAT SERPL-MCNC: 0.62 MG/DL
CRP SERPL-MCNC: 8 MG/L
DEPRECATED KAPPA LC FREE/LAMBDA SER: 1.46 RATIO
EGFR: 98 ML/MIN/1.73M2
ENDOMYSIUM IGA SER QL: NEGATIVE
ENDOMYSIUM IGA TITR SER: NORMAL
EOSINOPHIL # BLD AUTO: 0.08 K/UL
EOSINOPHIL NFR BLD AUTO: 1.6 %
ERYTHROCYTE [SEDIMENTATION RATE] IN BLOOD BY WESTERGREN METHOD: 26 MM/HR
GAMMA GLOB FLD ELPH-MCNC: 0.8 G/DL
GAMMA GLOB MFR SERPL ELPH: 11.3 %
GLIADIN IGA SER QL: <5 UNITS
GLIADIN IGG SER QL: <5 UNITS
GLIADIN PEPTIDE IGA SER-ACNC: NEGATIVE
GLIADIN PEPTIDE IGG SER-ACNC: NEGATIVE
GLUCOSE QUALITATIVE U: NEGATIVE MG/DL
GLUCOSE SERPL-MCNC: 90 MG/DL
HCT VFR BLD CALC: 40.8 %
HGB BLD-MCNC: 13.3 G/DL
IGA SER QL IEP: 183 MG/DL
IGG SER QL IEP: 751 MG/DL
IGM SER QL IEP: 44 MG/DL
IMM GRANULOCYTES NFR BLD AUTO: 0.4 %
INTERPRETATION SERPL IEP-IMP: NORMAL
KAPPA LC CSF-MCNC: 0.89 MG/DL
KAPPA LC SERPL-MCNC: 1.3 MG/DL
KETONES URINE: 15 MG/DL
LDH SERPL-CCNC: 187 U/L
LEUKOCYTE ESTERASE URINE: ABNORMAL
LYMPHOCYTES # BLD AUTO: 1.4 K/UL
LYMPHOCYTES NFR BLD AUTO: 27.6 %
M PROTEIN SPEC IFE-MCNC: NORMAL
MAGNESIUM SERPL-MCNC: 2.1 MG/DL
MAN DIFF?: NORMAL
MCHC RBC-ENTMCNC: 28.1 PG
MCHC RBC-ENTMCNC: 32.6 GM/DL
MCV RBC AUTO: 86.1 FL
MICROSCOPIC-UA: NORMAL
MONOCYTES # BLD AUTO: 0.52 K/UL
MONOCYTES NFR BLD AUTO: 10.2 %
NEUTROPHILS # BLD AUTO: 3.02 K/UL
NEUTROPHILS NFR BLD AUTO: 59.4 %
NITRITE URINE: NEGATIVE
PARATHYROID HORMONE INTACT: 59 PG/ML
PH URINE: 5.5
PHOSPHATE SERPL-MCNC: 3.6 MG/DL
PLATELET # BLD AUTO: 285 K/UL
POTASSIUM SERPL-SCNC: 4.5 MMOL/L
PROT SERPL-MCNC: 7 G/DL
PROTEIN URINE: 30 MG/DL
RBC # BLD: 4.74 M/UL
RBC # FLD: 13.4 %
RED BLOOD CELLS URINE: 0 /HPF
SODIUM SERPL-SCNC: 140 MMOL/L
SPECIFIC GRAVITY URINE: >1.03
SQUAMOUS EPITHELIAL CELLS: PRESENT
TSH SERPL-ACNC: 1.23 UIU/ML
TTG IGA SER IA-ACNC: <1.2 U/ML
TTG IGA SER-ACNC: NEGATIVE
TTG IGG SER IA-ACNC: 1.3 U/ML
TTG IGG SER IA-ACNC: NEGATIVE
UROBILINOGEN URINE: 1 MG/DL
WBC # FLD AUTO: 5.08 K/UL
WHITE BLOOD CELLS URINE: 3 /HPF

## 2024-01-17 ENCOUNTER — APPOINTMENT (OUTPATIENT)
Dept: ORTHOPEDIC SURGERY | Facility: CLINIC | Age: 69
End: 2024-01-17
Payer: MEDICARE

## 2024-01-17 VITALS
BODY MASS INDEX: 41.54 KG/M2 | HEART RATE: 59 BPM | HEIGHT: 61 IN | SYSTOLIC BLOOD PRESSURE: 126 MMHG | WEIGHT: 220 LBS | DIASTOLIC BLOOD PRESSURE: 77 MMHG

## 2024-01-17 PROCEDURE — 20610 DRAIN/INJ JOINT/BURSA W/O US: CPT | Mod: LT

## 2024-01-17 NOTE — DISCUSSION/SUMMARY
[Medication Risks Reviewed] : Medication risks reviewed [1 Week] : in 1 week [de-identified] : 68 year old female presents today status post her 2nd of 3 Orthovisc Gel Injections to the left knee. The patient tolerated the procedure well.  She will follow-up in 1 week for repeat injection. All questions were asked and answered. The patient verbalized understanding the plan.

## 2024-01-17 NOTE — ADDENDUM
[FreeTextEntry1] : This note was written by Radha Colón, acting as the  for Dr. Guzman. This note accurately reflects the work and decisions made by Dr. Guzman.

## 2024-01-17 NOTE — HISTORY OF PRESENT ILLNESS
[Pain Location] : pain [] : left knee [Worsening] : worsening [___ mths] : [unfilled] month(s) ago [Standing] : standing [Constant] : ~He/She~ states the symptoms seem to be constant [Sitting] : worsened by sitting [Running] : worsened by running [Walking] : worsened by walking [Knee Flexion] : worsened with knee flexion [Knee Extension] : worsened with knee extension [de-identified] : 68 year old female presents today for follow-up of her left knee pain and to receive her 2nd of 3 Orthovisc Gel Injections in her left knee. [de-identified] : going up and down the stairs, rising from a seated position [de-identified] : Diclofenac Gel

## 2024-01-17 NOTE — REASON FOR VISIT
[_______] : nay ORTIZ  for [Other: ____] : [unfilled] [FreeTextEntry2] : Lot #: [id number] | EXP: 06/30/2025

## 2024-01-17 NOTE — PROCEDURE
[Injection] : Injection [Left] : of the left [Knee Joint] : knee joint [Osteoarthritis] : Osteoarthritis [Joint Pain] : Joint pain [Patient] : patient [Verbal Consent Obtained] : verbal consent was obtained prior to the procedure [Alcohol] : Alcohol [Ethyl Chloride Spray] : ethyl chloride spray was used as a topical anesthetic [Lateral] : lateral [22] : a 22-gauge [Bandage Applied] : a bandage [Tolerated Well] : The patient tolerated the procedure well [None] : none [No Strenuous Activity___day(s)] : avoid strenuous activity for [unfilled] day(s) [___ Week(s)] : in [unfilled] week(s) [de-identified] : Orthovisc # 2 Left Knee  Discussed at length with the patient the planned Orthovisc injection. The risks, benefits, convalescence and alternatives were reviewed. The possible side effects discussed included but were not limited to: pain, swelling, heat and redness. There symptoms are generally mild but if they are extensive then contact the office. Giving pain relievers by mouth such as NSAIDs or Tylenol can generally treat the reactions to Orthovisc. Rare cases of infection have been noted. Rash, hives and itching may occur post injection. If you have muscle pain or cramps, flushing and or swelling of the face, rapid heart beat, nausea, dizziness, fever, chills, headache, difficulty breathing, swelling in the arms or legs, or have a prickly feeling of your skin, contact a health care provider immediately.  Following this discussion, the knee was prepped with Betadine and under sterile condition the Orthovisc injection was performed with a 22 gauge needle. The needle was introduced into the joint, aspiration was performed to ensure intra-articular placement and the medication was injected. Upon withdrawal of the needle the site was cleaned with alcohol and a Band-Aid applied. The patient tolerated the injection well and there were no adverse effects. Post injection instructions included no strenuous activity for 24 hours, cryotherapy and if there are any adverse effects to contact the office. [FreeTextEntry8] : 2 mL Orthovisc (Lot #: [id number] | EXP: 06/30/2025)

## 2024-01-20 RX ORDER — ERGOCALCIFEROL 1.25 MG/1
1.25 MG CAPSULE, LIQUID FILLED ORAL
Qty: 12 | Refills: 0 | Status: DISCONTINUED | COMMUNITY
Start: 2018-09-21 | End: 2024-01-20

## 2024-01-22 ENCOUNTER — APPOINTMENT (OUTPATIENT)
Dept: ORTHOPEDIC SURGERY | Facility: CLINIC | Age: 69
End: 2024-01-22
Payer: MEDICARE

## 2024-01-22 PROCEDURE — 20610 DRAIN/INJ JOINT/BURSA W/O US: CPT | Mod: LT

## 2024-01-22 RX ORDER — MESALAMINE 1.2 G/1
1.2 TABLET, DELAYED RELEASE ORAL
Qty: 180 | Refills: 2 | Status: ACTIVE | COMMUNITY
Start: 2023-01-16 | End: 1900-01-01

## 2024-01-22 NOTE — PROCEDURE
[Injection] : Injection [Left] : of the left [Knee Joint] : knee joint [Osteoarthritis] : Osteoarthritis [Patient] : patient [Joint Pain] : Joint pain [Verbal Consent Obtained] : verbal consent was obtained prior to the procedure [Alcohol] : Alcohol [Ethyl Chloride Spray] : ethyl chloride spray was used as a topical anesthetic [Lateral] : lateral [22] : a 22-gauge [Bandage Applied] : a bandage [Tolerated Well] : The patient tolerated the procedure well [None] : none [No Strenuous Activity___day(s)] : avoid strenuous activity for [unfilled] day(s) [___ Month(s)] : in [unfilled] month(s) [de-identified] : Orthovisc # 3 Left Knee  Discussed at length with the patient the planned Orthovisc injection. The risks, benefits, convalescence and alternatives were reviewed. The possible side effects discussed included but were not limited to: pain, swelling, heat and redness. There symptoms are generally mild but if they are extensive then contact the office. Giving pain relievers by mouth such as NSAIDs or Tylenol can generally treat the reactions to Orthovisc. Rare cases of infection have been noted. Rash, hives and itching may occur post injection. If you have muscle pain or cramps, flushing and or swelling of the face, rapid heart beat, nausea, dizziness, fever, chills, headache, difficulty breathing, swelling in the arms or legs, or have a prickly feeling of your skin, contact a health care provider immediately.  Following this discussion, the knee was prepped with Betadine and under sterile condition the Orthovisc injection was performed with a 22 gauge needle. The needle was introduced into the joint, aspiration was performed to ensure intra-articular placement and the medication was injected. Upon withdrawal of the needle the site was cleaned with alcohol and a Band-Aid applied. The patient tolerated the injection well and there were no adverse effects. Post injection instructions included no strenuous activity for 24 hours, cryotherapy and if there are any adverse effects to contact the office. [FreeTextEntry8] : 2 mL Orthovisc (Lot #: [id number] | EXP: 06/30/2025)

## 2024-01-22 NOTE — HISTORY OF PRESENT ILLNESS
[Pain Location] : pain [] : left knee [Worsening] : worsening [___ mths] : [unfilled] month(s) ago [Standing] : standing [Constant] : ~He/She~ states the symptoms seem to be constant [Sitting] : worsened by sitting [Running] : worsened by running [Walking] : worsened by walking [Knee Flexion] : worsened with knee flexion [Knee Extension] : worsened with knee extension [de-identified] : 68 year old female presents today for follow-up of her left knee pain and to receive her 3rd of 3 Orthovisc Gel Injections in her left knee. [de-identified] : Diclofenac Gel [de-identified] : going up and down the stairs, rising from a seated position

## 2024-01-22 NOTE — REASON FOR VISIT
[Other: ____] : [unfilled] [_______] : nay ORTIZ  for [FreeTextEntry2] : Lot #: [id number] | EXP: 06/30/2025

## 2024-01-22 NOTE — DISCUSSION/SUMMARY
[Medication Risks Reviewed] : Medication risks reviewed [Other: ____] : in [unfilled] [de-identified] : 68 year old female presents today status post her 3rd of 3 Orthovisc Gel Injections to the left knee. The patient tolerated the procedure well.  She will follow-up in 3 months for repeat evaluation. All questions were asked and answered. The patient verbalized understanding the plan.

## 2024-01-30 ENCOUNTER — APPOINTMENT (OUTPATIENT)
Dept: ORTHOPEDIC SURGERY | Facility: CLINIC | Age: 69
End: 2024-01-30

## 2024-02-14 DIAGNOSIS — M25.539 PAIN IN UNSPECIFIED WRIST: ICD-10-CM

## 2024-02-14 DIAGNOSIS — M79.642 PAIN IN RIGHT HAND: ICD-10-CM

## 2024-02-14 DIAGNOSIS — M79.643 PAIN IN UNSPECIFIED HAND: ICD-10-CM

## 2024-02-14 DIAGNOSIS — M25.532 PAIN IN LEFT WRIST: ICD-10-CM

## 2024-02-14 DIAGNOSIS — M79.641 PAIN IN RIGHT HAND: ICD-10-CM

## 2024-02-15 ENCOUNTER — APPOINTMENT (OUTPATIENT)
Dept: ORTHOPEDIC SURGERY | Facility: CLINIC | Age: 69
End: 2024-02-15
Payer: MEDICARE

## 2024-02-15 DIAGNOSIS — M19.049 PRIMARY OSTEOARTHRITIS, UNSPECIFIED HAND: ICD-10-CM

## 2024-02-15 DIAGNOSIS — M25.532 PAIN IN LEFT WRIST: ICD-10-CM

## 2024-02-15 DIAGNOSIS — M65.4 RADIAL STYLOID TENOSYNOVITIS [DE QUERVAIN]: ICD-10-CM

## 2024-02-15 PROCEDURE — 20600 DRAIN/INJ JOINT/BURSA W/O US: CPT | Mod: LT

## 2024-02-15 PROCEDURE — 73130 X-RAY EXAM OF HAND: CPT | Mod: 50

## 2024-02-15 PROCEDURE — 99215 OFFICE O/P EST HI 40 MIN: CPT | Mod: 25

## 2024-02-15 PROCEDURE — 20550 NJX 1 TENDON SHEATH/LIGAMENT: CPT | Mod: 59,LT

## 2024-02-15 NOTE — PHYSICAL EXAM
[de-identified] : Examination at the level of the [left] wrist reveals tenderness at the level of the first dorsal compartment with a positive finkelstein. Examination of the [left] thumb basal joint reveals pain with compression of the CMC joint with a positive grind test for pain [de-identified] : [4] views of [bilateral hands and wrists] were obtained today in my office and were seen by me and discussed with the patient.  These [show findings consistent with bilateral basal joint OA and findings of IP joint OA]

## 2024-02-15 NOTE — ASSESSMENT
[FreeTextEntry1] : ASSESSMENT: The patient comes in today with chronic severely exacerbated symptoms of basal joint arthropathy and tendinitis of the wrist.  At this point we have discussed treatment modalities I do recommend bracing.  She elects for an injection as well.   The patient was adequately and thoroughly informed of my assessment of their current condition(s).  - This may diminish bodily function for the extremity. We discussed prognosis, tx modalities including operative and nonoperative options for the above diagnostic assessment. As always, 2nd opinion is always provided as an option.  When accessible, I was able to review other physicians note(s) including reviewing other tests, imaging results as well as personally view these results for my own interpretation.   Injection:   The risks and benefits of a steroid injection were discussed in detail. The risks include but are not limited to: pain, infection, swelling, flare response, bleeding, subcutaneous fat atrophy, skin depigmentation and/or elevation of blood sugar. The risk of incomplete resolution of symptoms, recurrence and additional intervention was reviewed and considered by the patient. The patient agreed to proceed and under a sterile prep, I injected 1 unit 6mg into 1 cc of a combination of Celestone and Lidocaine into the left basal joint, left de Quervain's. The patient tolerated the injection well. The patient was adequately and thoroughly informed of my assessment of their current condition(s).  DISCUSSION: 1.  Injections as above.  Bracing provided.  Follow-up 2 months 2. [x] 3. [x]

## 2024-02-15 NOTE — HISTORY OF PRESENT ILLNESS
[FreeTextEntry1] : Divine is a pleasant 68-year-old female who presents today with a chronic worsening history of left wrist and hand discomfort radiating up into the forearm and difficulty with pinch and .

## 2024-02-19 ENCOUNTER — APPOINTMENT (OUTPATIENT)
Dept: GASTROENTEROLOGY | Facility: CLINIC | Age: 69
End: 2024-02-19
Payer: MEDICARE

## 2024-02-19 VITALS
RESPIRATION RATE: 14 BRPM | HEART RATE: 70 BPM | BODY MASS INDEX: 41.54 KG/M2 | HEIGHT: 61 IN | OXYGEN SATURATION: 97 % | SYSTOLIC BLOOD PRESSURE: 151 MMHG | WEIGHT: 220 LBS | TEMPERATURE: 98.3 F | DIASTOLIC BLOOD PRESSURE: 77 MMHG

## 2024-02-19 DIAGNOSIS — Z09 ENCOUNTER FOR FOLLOW-UP EXAMINATION AFTER COMPLETED TREATMENT FOR CONDITIONS OTHER THAN MALIGNANT NEOPLASM: ICD-10-CM

## 2024-02-19 PROCEDURE — 99214 OFFICE O/P EST MOD 30 MIN: CPT

## 2024-02-19 RX ORDER — MULTIVITAMIN
TABLET ORAL
Qty: 2 | Refills: 0 | Status: DISCONTINUED | COMMUNITY
End: 2024-02-19

## 2024-02-19 RX ORDER — SEMAGLUTIDE 0.68 MG/ML
2 INJECTION, SOLUTION SUBCUTANEOUS
Qty: 12 | Refills: 1 | Status: ACTIVE | COMMUNITY
Start: 2024-02-19 | End: 1900-01-01

## 2024-02-19 RX ORDER — ZINC SULFATE 50(220)MG
CAPSULE ORAL
Refills: 0 | Status: DISCONTINUED | COMMUNITY
End: 2024-02-19

## 2024-02-19 RX ORDER — HYALURONATE SODIUM 20 MG/2 ML
20 SYRINGE (ML) INTRAARTICULAR
Qty: 1 | Refills: 0 | Status: DISCONTINUED | OUTPATIENT
Start: 2023-12-12 | End: 2024-02-19

## 2024-02-19 NOTE — REVIEW OF SYSTEMS
Patient is a 60-year-old male smoker who has discharged 3 days ago for hypoxia. He returns with increased shortness of breath. He has a history of COPD and polysubstance abuse. Chart review indicates also history of heart failure. he denies any chest pain fevers or significant leg swelling. Patient given albuterol as well as Solu-Medrol in the ED. -Showed significant CO2 retention was started on BiPAP. Initial oxygen saturations were in the 70s The history is provided by the patient. No  was used. Shortness of Breath Pertinent negatives include no fever, no headaches, no vomiting, no abdominal pain, no rash and no leg swelling. Past Medical History:  
Diagnosis Date  Acute respiratory failure with hypercapnia ([facility] [address] 2/1/2018  Chronic obstructive pulmonary disease ([facility]  Heart failure ([facility]  Sleep disorder No past surgical history on file. No family history on file. Social History Socioeconomic History  Marital status:  Spouse name: Not on file  Number of children: Not on file  Years of education: Not on file  Highest education level: Not on file Social Needs  Financial resource strain: Not on file  Food insecurity - worry: Not on file  Food insecurity - inability: Not on file  Transportation needs - medical: Not on file  Transportation needs - non-medical: Not on file Occupational History  Not on file Tobacco Use  Smoking status: Current Every Day Smoker Packs/day: 2.00 Substance and Sexual Activity  Alcohol use: No  
  Comment: once a month beer  Drug use: Yes Types: Marijuana, Methamphetamines  Sexual activity: Not on file Other Topics Concern  Not on file Social History Narrative  Not on file ALLERGIES: Patient has no known allergies. Review of Systems Constitutional: Negative for fatigue and fever. HENT: Negative for congestion. Respiratory: Positive for shortness of breath. Cardiovascular: Negative for leg swelling. Gastrointestinal: Negative for abdominal pain, nausea and vomiting. Genitourinary: Negative for flank pain. Musculoskeletal: Negative for back pain. Skin: Negative for rash. Neurological: Negative for headaches. Psychiatric/Behavioral: Negative for confusion. Vitals:  
 12/02/18 1434 12/02/18 1447 12/02/18 1454 12/02/18 1502 BP: 122/62 Pulse:  76 Resp: 20 13 Temp: 97.8 °F (36.6 °C) SpO2: (!) 76% 99% 93% 94% Weight: 141.1 kg (311 lb) Height: 6' (1.829 m) Physical Exam  
Constitutional: He is oriented to person, place, and time. He appears well-developed and well-nourished. No distress. HENT:  
Head: Normocephalic and atraumatic. Eyes: Conjunctivae and EOM are normal. Pupils are equal, round, and reactive to light. Neck: Normal range of motion. Neck supple. Cardiovascular: Normal rate, regular rhythm and normal heart sounds. Pulmonary/Chest: He is in respiratory distress. He has decreased breath sounds. He has no wheezes. He has no rales. Poor inspiratory effort Abdominal: Soft. He exhibits no distension. There is no tenderness. There is no rebound. Musculoskeletal: Normal range of motion. He exhibits no edema or tenderness. Neurological: He is alert and oriented to person, place, and time. Skin: Skin is warm and dry. No rash noted. He is not diaphoretic. Psychiatric: He has a normal mood and affect. His behavior is normal.  
Nursing note and vitals reviewed. MDM Number of Diagnoses or Management Options Chronic obstructive pulmonary disease, unspecified COPD type [facility]): new and requires workup Hypoxia: new and requires workup Nicotine dependence with other nicotine-induced disorder, unspecified nicotine product type: established and worsening Polysubstance abuse ([facility] [address]): established and worsening Diagnosis management comments: Significant hypoxia secondary to COPD and likely CHF. Only minimal improvement on BiPAP after one hour. The admitted to the intensivist for further evaluation and treatment. Heather Arnold MD; 12/2/2018 @5:10 PM Voice dictation software was used during the making of this note. This software is not perfect and grammatical and other typographical errors may be present. This note has not been proofread for errors. 
=================================================================== Amount and/or Complexity of Data Reviewed Clinical lab tests: ordered and reviewed (Results for orders placed or performed during the hospital encounter of 12/02/18 
-CBC WITH AUTOMATED DIFF Result                      Value             Ref Range WBC                         8.0               4.3 - 11.1 K* 
     RBC                         3.67 (L)          4.23 - 5.6 M* HGB                         11.7 (L)          13.6 - 17.2 * HCT                         39.7 (L)          41.1 - 50.3 % MCV                         108.2 (H)         79.6 - 97.8 * MCH                         31.9              26.1 - 32.9 * MCHC                        29.5 (L)          31.4 - 35.0 * RDW                         13.5              11.9 - 14.6 % PLATELET                    177               150 - 450 K/* MPV                         10.3              9.4 - 12.3 FL ABSOLUTE NRBC               0.00              0.0 - 0.2 K/* DF                          AUTOMATED NEUTROPHILS                 76                43 - 78 % LYMPHOCYTES                 12 (L)            13 - 44 % MONOCYTES                   10                4.0 - 12.0 % EOSINOPHILS                 1                 0.5 - 7.8 %      BASOPHILS                   0                 0.0 - 2.0 %   
 IMMATURE GRANULOCYTES       1                 0.0 - 5.0 %   
     ABS. NEUTROPHILS            6.1               1.7 - 8.2 K/* ABS. LYMPHOCYTES            1.0               0.5 - 4.6 K/* ABS. MONOCYTES              0.8               0.1 - 1.3 K/* ABS. EOSINOPHILS            0.1               0.0 - 0.8 K/* ABS. BASOPHILS              0.0               0.0 - 0.2 K/* ABS. IMM. GRANS.            0.1               0.0 - 0.5 K/* 
-METABOLIC PANEL, COMPREHENSIVE Result                      Value             Ref Range Sodium                      142               136 - 145 mm* Potassium                   4.5               3.5 - 5.1 mm* Chloride                    99                98 - 107 mmo* CO2                         41 (HH)           21 - 32 mmol* Anion gap                   2 (L)             7 - 16 mmol/L Glucose                     131 (H)           65 - 100 mg/* BUN                         18                6 - 23 MG/DL Creatinine                  1.13              0.8 - 1.5 MG* 
     GFR est AA                  >60               >60 ml/min/1* GFR est non-AA              >60               >60 ml/min/1* Calcium                     8.5               8.3 - 10.4 M* Bilirubin, total            0.3               0.2 - 1.1 MG* ALT (SGPT)                  22                12 - 65 U/L   
     AST (SGOT)                  10 (L)            15 - 37 U/L Alk. phosphatase            66                50 - 136 U/L Protein, total              6.3               6.3 - 8.2 g/* Albumin                     2.7 (L)           3.5 - 5.0 g/* Globulin                    3.6 (H)           2.3 - 3.5 g/* A-G Ratio                   0.8 (L)           1.2 - 3.5     
-BNP Result                      Value             Ref Range BNP                         371               pg/mL         
-DRUG SCREEN, URINE Result                      Value             Ref Range PCP(PHENCYCLIDINE)          NEGATIVE BENZODIAZEPINES             NEGATIVE                        
     COCAINE                     NEGATIVE AMPHETAMINES                POSITIVE METHADONE                   NEGATIVE                        
     THC (TH-CANNABINOL)         POSITIVE                        
     OPIATES                     POSITIVE                        
     BARBITURATES                NEGATIVE                        
-POC CG8I Result                      Value             Ref Range Device:                                                     
 High Flow Nasal Cannula 
     pH (POC)                    7.252 (L)         7.35 - 7.45   
     pCO2 (POC)                  90.5 (HH)         35 - 45 MMHG  
     pO2 (POC)                   52 (L)            75 - 100 MMHG 
     HCO3 (POC)                  39.9 (H)          22 - 26 MMOL* 
     sO2 (POC)                   78 (L)            95 - 98 % Base excess (POC)           9                 mmol/L Allens test (POC)           YES Site                        RIGHT RADIAL Patient temp. 98.6 Specimen type (POC)         ARTERIAL Sodium, POC                 139               136 - 145 MM* Potassium, POC              4.3               3.5 - 5.1 MM* Glucose, POC                139 (H)           65 - 100 MG/* Calcium, ionized (POC)      1.23              1.12 - 1.32 * Performed by Lara 
-POC CG8I Result                      Value             Ref Range [As Noted in HPI] : as noted in HPI Device:                     BIPAP                           
     FIO2 (POC)                  40                %             
     pH (POC)                    7.325 (L)         7.35 - 7.45   
     pCO2 (POC)                  79.1 (HH)         35 - 45 MMHG  
     pO2 (POC)                   54 (L)            75 - 100 MMHG 
     HCO3 (POC)                  41.3 (H)          22 - 26 MMOL* 
     sO2 (POC)                   83 (L)            95 - 98 % Base excess (POC)           12                mmol/L Tidal volume                510               ml            
     Set Rate                    10                bpm           
     PIP (POC)                   20                              
     Allens test (POC)           YES Inspiratory Time            0.9               sec Site                        RIGHT RADIAL Patient temp. 98.6 Specimen type (POC)         ARTERIAL Sodium, POC                 139               136 - 145 MM* Potassium, POC              4.2               3.5 - 5.1 MM* Glucose, POC                117 (H)           65 - 100 MG/* Calcium, ionized (POC)      1.20              1.12 - 1.32 * Performed by Lara 
-EKG, 12 LEAD, INITIAL Result                      Value             Ref Range Ventricular Rate            71                BPM           
     Atrial Rate                 71                BPM           
     P-R Interval                204               ms            
     QRS Duration                86                ms      Q-T Interval                372               ms            
     QTC Calculation (Bezet)     404               ms            
 Calculated P Axis           33                degrees Calculated R Axis           72                degrees Calculated T Axis           77                degrees Diagnosis                                                   
 !! AGE AND GENDER SPECIFIC ECG ANALYSIS !! Normal sinus rhythm Anterior infarct , age undetermined Abnormal ECG When compared with ECG of 16-NOV-2018 02:16, 
 Previous ECG has undetermined rhythm, needs review QRS axis Shifted left ST no longer depressed in Inferior leads Non-specific change in ST segment in Lateral leads Nonspecific T wave abnormality, worse in Lateral leads ) Tests in the radiology section of CPT®: ordered and reviewed (Xr Chest [facility] Result Date: 12/2/2018 CHEST X-RAY, one view. HISTORY:  Chest pain  Shortness of breath TECHNIQUE:  AP upright portable view. COMPARISON: 19 November 2018 FINDINGS:   Pulmonary vascular congestion. Left hemidiaphragm is obscured, possibly due to technique. Obesity decreases quality the exam. Right hemidiaphragm is well-defined. Heart may be enlarged IMPRESSION:  Pulmonary venous hypertension probably early interstitial edema. ) Review and summarize past medical records: yes Discuss the patient with other providers: yes Risk of Complications, Morbidity, and/or Mortality Presenting problems: high Diagnostic procedures: high Management options: high Patient Progress Patient progress: improved ED Course as of Dec 02 1704 Linda Montero Dec 02, 2018  
1620 Discussed case with the intensivist he would like another ABG performed after he has been on BiPAP for one hour. [JL] ED Course User Index [JL] Ange Moore MD  
 
 
Procedures

## 2024-02-20 NOTE — HISTORY OF PRESENT ILLNESS
[FreeTextEntry1] : 68-year-old lady history of arthritis, fibromyalgia, obesity, osteopenia, hypertension, ulcerative colitis managed on longstanding steroids, no TI biopsies ever, no Biologics ever, here in follow-up after December visit. Initial dx: in her late 30s  UC: proctitis  Presentation: blood/mucus; no weight loss  Meds failed: Budesonide caused hair loss  Current meds: Prednisone, mesalamine  Last colonoscopy:  11/22 Colon (Nate): proctitis endoscopically  EGD: nl  Path:  1. Gastric antrum, biopsy:  - Gastric mucosa with reactive gastropathy.  - Immunostains for H. Pylori are negative.  2. Gastric body, biopsy  - Gastric mucosa with reactive gastropathy.  - Immunostains for H. Pylori are negative.  3 Cecum biopsy:  - Colonic mucosa with intramucosal lymphoid aggregates, negative for  inflammation.  4 Ascending biopsy:  - Colonic mucosa with intramucosal lymphoid aggregates, negative for  inflammation.  5 Transverse biopsy:  - Colonic mucosa with intramucosal lymphoid aggregates, negative for  inflammation.  6 Descending biopsy:  - Colonic mucosa with intramucosal lymphoid aggregates, negative for  inflammation.  7 Sigmoid biopsy:  - Colonic mucosa with intramucosal lymphoid aggregates, negative for  inflammation.  8 Rectum biopsy:  - Colonic mucosa with intramucosal lymphoid aggregates, negative for  inflammation.    note: Negative for microscopic colitis.  Colon 2021:  Path '21:  Final Diagnosis  1. Cecum, biopsy:  - Consistent with quiescent inflammatory bowel disease.  - Negative for granulomas. Negative for dysplasia.    2. Ascending colon, biopsy:  - Consistent with quiescent inflammatory bowel disease.  - Negative for granulomas. Negative for dysplasia.    3. Transverse colon, biopsy:  - Consistent with focal active inflammatory bowel disease with  erosion and reparative changes.  - Negative for granulomas. Negative for dysplasia.    4. Descending colon, biopsy:  - Consistent with quiescent inflammatory bowel disease.  - Negative for granulomas. Negative for dysplasia.    5. Sigmoid colon, biopsy:  - Consistent with quiescent inflammatory bowel disease.  - Negative for granulomas. Negative for dysplasia.    6. Rectum, biopsy:  - Consistent with active inflammatory bowel disease with  ulceration.  - Negative for granulomas. Negative for dysplasia.  '11 Colon: nl      Last imaging:'21 CT  IMPRESSION:  No significant colonic wall thickening and be seen but there is evidence of mild submucosal fatty proliferation involving the rectum and sigmoid colon which can be seen in a chronic colitis. No small bowel disease demonstrated.    3.5 cm simple-appearing postmenopausal right ovarian cyst amenable to ultrasound follow-up    Surgery: no    BM/D:1-2  with flare 10-12 a day  flares twice a year on average last time May 2023  BM/night:0  Blood:no  Mucus:no  Weight loss:no; wants to lose weight  Fatigue:+  Joint aches:arthritis; r knee replacement, left knee injections  Skin issues:itchiness  Eye issues: dry, burning (saw doc for drops)    TB: -ve 6/23  HBV:-ve active infection 6/23  incomplete evacuation, bloating, itching internally on the left side (told to use witchhazel oil wipes but not helping) colon '22: 3  Cecum biopsy: - Colonic mucosa with intramucosal lymphoid aggregates, negative for inflammation. 4  Ascending biopsy: - Colonic mucosa with intramucosal lymphoid aggregates, negative for inflammation. 5  Transverse biopsy: - Colonic mucosa with intramucosal lymphoid aggregates, negative for inflammation. 6  Descending biopsy: - Colonic mucosa with intramucosal lymphoid aggregates, negative for inflammation. 7  Sigmoid biopsy: - Colonic mucosa with intramucosal lymphoid aggregates, negative for inflammation. 8  Rectum biopsy: - Colonic mucosa with intramucosal lymphoid aggregates, negative for inflammation.  note: Negative for microscopic colitis. 1. Baseline labs January 2024: Normal CBC with a hemoglobin of 13.3, MCV 86.1, WBC 5.08, platelets 285 CRP 8, historically always elevated, most recent value June 2023 6; ESR 26, historically not elevated though last value June 2023 was 23 Celiac serologies negative, TSH normal, parathyroid hormone normal, vitamin D normal, CMP normal, CK normal, LDH normal, electrolytes normal, June 2023: Calprotectin 102; normal GI PCR, C. difficile 2. Patient should change of current mesalamine regimen to something more substantial due to UC flares which occur on average about twice a year and has been managed with steroids historically. We discussed Entyvio, will COPD, Zeposia. I gave her the Crohn's and colitis foundation website and asked her to read more about these 3. I also asked her to join the patient portal so we could talk about her decision there as well. -Done 4. Follow-up in 2 months, telephonic okay 5. After deciding on therapy, she is due for colonoscopy because the one last done in 2022 showed active inflammation. I would prefer to repeat this after some time on a new therapy to assess the efficacy of that therapy but if she does not make a decision in due time she is due by summer 2024 at the latest   sster, son with MS sister is bedridden

## 2024-02-20 NOTE — ASSESSMENT
[FreeTextEntry1] : 68-year-old lady history of arthritis, fibromyalgia, obesity, osteopenia, hypertension, ulcerative colitis managed on longstanding steroids, no TI biopsies ever, no Biologics ever, here in follow-up after December visit. Pt's '21 colon active colitis, repeat '22 no inflammation. Due to 2x/y flares I had been planning on discussing escalating ASA to Xeposia/Velsipity or Entyvio. She is not convinced she needs the switch, and I have not treated her through a flare yet so hard to convince her with limited data.

## 2024-02-20 NOTE — PLAN
[TextEntry] : Colonoscopy ozempic - pt reports gradual lifelong weight gain, inability to lose weight through diet/exercise. There is some data that IBD pts on weight loss meds have less flares, wrote order for ozempic after confirming no personal/fam h/o thyroid cancer and explaining risk of pancreatitis and gastroparesis.  Zeposia/Velsipity vs Entyvio - pt will continue to think about it, roopa s/p colon bx data  Patient was made aware that I am transferring the bulk of my practice to [facility] starting in March, though I am still available in [facility] on a monthly basis to see exclusively IBD patients.  Patient was advised to follow-up through the portal if scheduling delays arise.

## 2024-03-04 ENCOUNTER — APPOINTMENT (OUTPATIENT)
Dept: ORTHOPEDIC SURGERY | Facility: CLINIC | Age: 69
End: 2024-03-04
Payer: MEDICARE

## 2024-03-04 VITALS — BODY MASS INDEX: 41.19 KG/M2 | WEIGHT: 218 LBS

## 2024-03-04 VITALS
SYSTOLIC BLOOD PRESSURE: 125 MMHG | HEART RATE: 79 BPM | DIASTOLIC BLOOD PRESSURE: 73 MMHG | WEIGHT: 220 LBS | BODY MASS INDEX: 41.54 KG/M2 | HEIGHT: 61 IN

## 2024-03-04 DIAGNOSIS — M17.12 UNILATERAL PRIMARY OSTEOARTHRITIS, LEFT KNEE: ICD-10-CM

## 2024-03-04 DIAGNOSIS — M70.61 TROCHANTERIC BURSITIS, RIGHT HIP: ICD-10-CM

## 2024-03-04 PROCEDURE — 99215 OFFICE O/P EST HI 40 MIN: CPT

## 2024-03-04 PROCEDURE — 73564 X-RAY EXAM KNEE 4 OR MORE: CPT | Mod: LT

## 2024-03-05 NOTE — DISCUSSION/SUMMARY
[Medication Risks Reviewed] : Medication risks reviewed [Surgical risks reviewed] : Surgical risks reviewed [Other: ____] : in [unfilled] [de-identified] : Patient is a 68 year female here today for follow-up of her severe osteoarthritis of the left knee.  As well as greater trochanteric bursitis of the right hip she has tried extensive conservative treatments in the past including directed physical therapy, activity modification, NSAID use, injections, and has failed to have relief of the pain in her knee. Due to the fact that she has tried exhaust conservative treatment, she wishes to proceed with a total knee arthroplasty with tess, and I do think that is indicated. We discussed the risk and benefit alternatives including but not limited to blood loss, infection, damage to neurovascular structures risk, need for revision surgery risk of periprosthetic fracture. Patient is in agreement and wishes to proceed. We will obtain a CT scan for Tess planning. We will obtain medical clearance. I will see her back 1 week-operatively for repeat evaluation of the left knee.  As for the right hip I do believe the patient is having exacerbation of right hip greater trochanteric bursitis, I recommend physical therapy and provided a referral for that today.  I have also provided a new referral to receive an ultrasound-guided cortisone injection into the right hip greater trochanteric bursa.  Focus on low impact activity and exercise and take Tylenol and NSAIDs as needed for any pain.  Follow-up in as-needed basis for the right hip.  All questions were asked and answered. The patient verbalized understanding the plan.   The patient is a 68 year female who has severe osteoarthritis of the left knee. Based upon the patients continued symptoms and failure to respond to conservative treatment I have recommended a left total knee replacement for the patient. A discussion was had with the patient regarding a left total knee replacement. A long discussion was had with the patient as what the total joint replacement would entail. A model was used to demonstrate the operation and to discuss bearing surfaces of the implants. The hospitalization and rehabilitation were discussed. The use of perioperative antibiotics and DVT prophylaxis were discussed. The risks, benefits and alternatives to surgical intervention were discussed at length with the patient. Specific risks discussed included: infection, wound breakdown, numbness and damage to nerves, tendon, muscle, arteries or other blood vessels. The possibility of recurrent pain, no improvement in pain and actual worsening of the pain were also mentioned in conversation with the patient. Medical complications related to the patient's general medical health including deep vein thrombosis, pulmonary embolus, heart attack, stroke, death and other complications from anesthesia were discussed as well. The patient was told that we will take steps to minimize these risks by using sterile technique, antibiotics and DVT prophylaxis when appropriate and following the patient postoperatively in the clinic setting to monitor progress. The benefits of surgery were discussed with the patient including the potential to improve the current clinical condition through operative intervention. Alternatives to surgical intervention include continued conservative management which may yield less than optimal results in this particular patient. All questions were answered to the satisfaction of the patient. We did discuss implant choices and fixation, with shared decision making with the patient. We discussed that the knee replacement will be done with robotic assistance to enhance accuracy and dynamic joint balancing.

## 2024-03-05 NOTE — REVIEW OF SYSTEMS
[Joint Pain] : joint pain [Joint Stiffness] : joint stiffness [Joint Swelling] : joint swelling [Negative] : Heme/Lymph [FreeTextEntry9] : left knee and right hip pain

## 2024-03-05 NOTE — HISTORY OF PRESENT ILLNESS
[Pain Location] : pain [] : right hip [Worsening] : worsening [___ yrs] : [unfilled] year(s) ago [Standing] : standing [Constant] : ~He/She~ states the symptoms seem to be constant [Bending] : worsened by bending [Hip Movement] : worsened by hip movement [Sitting] : worsened by sitting [Running] : worsened by running [Walking] : worsened by walking [Knee Flexion] : worsened with knee flexion [Knee Extension] : worsened with knee extension [de-identified] : Patient is a 68-year-old female here today for follow-up of left knee osteoarthritis and right hip pain.  The patient has been spearing seeing left knee pain for more than a year.  She has undergone multiple injections both gel and cortisone.  She has tried at home directed exercises without relief of her pain.  She states that her knee sometimes chelsi on her.  The pain is not responsive to Tylenol and anti-inflammatories.  She would like to discuss surgical options today.  As for her right hip she has been having pain for the past few months the pain is located in the lateral aspect of the hip and is very tender to palpation.  She has difficulty laying on her right side.  She was advised to receive a cortisone injection at her last visit for right hip greater trochanteric bursitis but she has not yet gone for the injection denies any pain in the groin or lower back.  Denies any recent injuries falls or trauma, significant changes to her medical history since her last visit, numbness or tingling in the lower extremity. [de-identified] : Diclofenac Gel [de-identified] : putting on socks and shoes, getting in and out of the car, going up and down the stairs, rising from a seated position

## 2024-03-05 NOTE — PHYSICAL EXAM
[Normal] : Gait: normal [de-identified] : Left knee exam shows mild effusion, ROM is 5-120 degrees, no instability, no pain with Debra, medial joint line tenderness right hip exam showed no groin pain with SLR, ROM is full without pain, SRINI negative, FADIR negative. There is tenderness palpation of the right greater trochanter. 5/5 motor strength in bilateral lower extremities. Sensory: Intact in bilateral lower extremities. DTRs: Biceps, brachioradialis, triceps, patellar, ankle and plantar 2+ and symmetric bilaterally. Pulses: dorsalis pedis, posterior tibial, femoral, popliteal, and radial 2+ and symmetric bilaterally. [de-identified] : 4 views of the left knee taken in office today show no acute fractures dislocations or severe degenerative changes noted consistent with Kellgren-Jamie grade 3 changes.

## 2024-03-18 DIAGNOSIS — K51.90 ULCERATIVE COLITIS, UNSPECIFIED, W/OUT COMPLICATIONS: ICD-10-CM

## 2024-03-20 ENCOUNTER — OUTPATIENT (OUTPATIENT)
Dept: OUTPATIENT SERVICES | Facility: HOSPITAL | Age: 69
LOS: 1 days | End: 2024-03-20
Payer: MEDICARE

## 2024-03-20 ENCOUNTER — APPOINTMENT (OUTPATIENT)
Dept: CT IMAGING | Facility: CLINIC | Age: 69
End: 2024-03-20
Payer: MEDICARE

## 2024-03-20 DIAGNOSIS — N60.02 SOLITARY CYST OF LEFT BREAST: Chronic | ICD-10-CM

## 2024-03-20 DIAGNOSIS — M17.12 UNILATERAL PRIMARY OSTEOARTHRITIS, LEFT KNEE: ICD-10-CM

## 2024-03-20 DIAGNOSIS — Z98.890 OTHER SPECIFIED POSTPROCEDURAL STATES: Chronic | ICD-10-CM

## 2024-03-20 PROCEDURE — 73700 CT LOWER EXTREMITY W/O DYE: CPT | Mod: 26,LT

## 2024-03-20 PROCEDURE — 73700 CT LOWER EXTREMITY W/O DYE: CPT

## 2024-03-27 DIAGNOSIS — M25.552 PAIN IN LEFT HIP: ICD-10-CM

## 2024-03-28 ENCOUNTER — APPOINTMENT (OUTPATIENT)
Dept: ORTHOPEDIC SURGERY | Facility: CLINIC | Age: 69
End: 2024-03-28
Payer: MEDICARE

## 2024-03-28 VITALS
DIASTOLIC BLOOD PRESSURE: 81 MMHG | HEART RATE: 75 BPM | BODY MASS INDEX: 40.4 KG/M2 | WEIGHT: 214 LBS | HEIGHT: 61 IN | SYSTOLIC BLOOD PRESSURE: 119 MMHG

## 2024-03-28 DIAGNOSIS — M25.551 PAIN IN RIGHT HIP: ICD-10-CM

## 2024-03-28 PROCEDURE — 99214 OFFICE O/P EST MOD 30 MIN: CPT

## 2024-03-28 NOTE — DISCUSSION/SUMMARY
[de-identified] : Previous x-ray was reviewed with the patient.  Clinically, the patient is not exhibiting signs or symptoms of intra-articular hip degeneration.  Her pain is more consistent with ongoing greater trochanteric bursitis of the right hip.  At this time the patient is recommended an MRI of the right hip to confirm this diagnosis.  She would likely be indicated for repeat steroid injection.  She will continue with activities as tolerated.  All questions were answered to the patient's satisfaction.

## 2024-03-28 NOTE — HISTORY OF PRESENT ILLNESS
[de-identified] : Patient presents today for reevaluation of ongoing right lateral hip pain.  The patient has previously had a greater trochanteric bursal injection within the past month.  She states she had approximately 3 days of complete resolution of the pain.  She denies of any groin pain.  However, she reports of increasing difficulty with weightbearing.  She reports of buckling like sensation at times associated with this right lateral hip pain.  She is not using a cane or a walker.  She is currently scheduled for left knee replacement  within the next month.  She is concerned she will not be able to proceed with the surgery because of this increasing right hip pain.  No trauma is reported.  No use of a cane or a walker.  Review of Systems- Constitutional: No fever or chills.  Cardiovascular: No orthopnea or chest pain Pulmonary: No shortness of breath.  GI: No nausea or vomiting or abdominal pain. Musculoskeletal: see HPI  Psychiatric: No anxiety and depression.

## 2024-03-28 NOTE — PHYSICAL EXAM
[de-identified] : The patient is conversive and in no apparent distress. The patient is alert and oriented to person, place, and time. Affect and mood appear normal. The head is normocephalic and atraumatic. Skin shows normal turgor with no evidence of eczema or psoriasis. No respiratory distress noted. Sensation grossly intact. MUSCULOSKELETAL:   SEE BELOW  Right hip exam in a supine position demonstrates equal leg length.  There is symmetrical range of motion passively of both hips.  Hip flexion of 60 degrees.  External rotation of 50 degrees.  Internal rotation of 10 degrees.  Hip abduction of 30 degrees.  Hip abduction of 5 degrees.  No pain provocation of significant stiffness is noted with gentle range of motion testing of the right hip.  There is specific tenderness over the posterior aspect of the greater trochanteric bursal region.  No pain provocation of significant stiffness is noted with gentle range of motion testing of the right hip.  There is specific tenderness over the posterior aspect of the greater trochanteric bursal region.  There is minimal discomfort over the proximal aspect of the IT band.  No tenderness of the distal IT band. [de-identified] : Previous pelvis x-ray was reviewed.  Mild to moderate central space narrowing is noted of the right hip.  No large osteophyte.  No fractures.  No hardware.

## 2024-04-03 ENCOUNTER — OUTPATIENT (OUTPATIENT)
Dept: OUTPATIENT SERVICES | Facility: HOSPITAL | Age: 69
LOS: 1 days | End: 2024-04-03
Payer: MEDICARE

## 2024-04-03 VITALS
RESPIRATION RATE: 16 BRPM | HEIGHT: 62 IN | WEIGHT: 211.64 LBS | HEART RATE: 75 BPM | TEMPERATURE: 98 F | DIASTOLIC BLOOD PRESSURE: 70 MMHG | SYSTOLIC BLOOD PRESSURE: 110 MMHG | OXYGEN SATURATION: 96 %

## 2024-04-03 DIAGNOSIS — Z98.890 OTHER SPECIFIED POSTPROCEDURAL STATES: Chronic | ICD-10-CM

## 2024-04-03 DIAGNOSIS — M17.12 UNILATERAL PRIMARY OSTEOARTHRITIS, LEFT KNEE: ICD-10-CM

## 2024-04-03 DIAGNOSIS — N60.02 SOLITARY CYST OF LEFT BREAST: Chronic | ICD-10-CM

## 2024-04-03 DIAGNOSIS — I10 ESSENTIAL (PRIMARY) HYPERTENSION: ICD-10-CM

## 2024-04-03 DIAGNOSIS — Z29.9 ENCOUNTER FOR PROPHYLACTIC MEASURES, UNSPECIFIED: ICD-10-CM

## 2024-04-03 DIAGNOSIS — Z01.818 ENCOUNTER FOR OTHER PREPROCEDURAL EXAMINATION: ICD-10-CM

## 2024-04-03 DIAGNOSIS — E66.9 OBESITY, UNSPECIFIED: ICD-10-CM

## 2024-04-03 DIAGNOSIS — Z13.89 ENCOUNTER FOR SCREENING FOR OTHER DISORDER: ICD-10-CM

## 2024-04-03 LAB
A1C WITH ESTIMATED AVERAGE GLUCOSE RESULT: 5.3 % — SIGNIFICANT CHANGE UP (ref 4–5.6)
ALBUMIN SERPL ELPH-MCNC: 4.2 G/DL — SIGNIFICANT CHANGE UP (ref 3.3–5.2)
ALP SERPL-CCNC: 70 U/L — SIGNIFICANT CHANGE UP (ref 40–120)
ALT FLD-CCNC: 17 U/L — SIGNIFICANT CHANGE UP
ANION GAP SERPL CALC-SCNC: 11 MMOL/L — SIGNIFICANT CHANGE UP (ref 5–17)
APTT BLD: 32.7 SEC — SIGNIFICANT CHANGE UP (ref 24.5–35.6)
AST SERPL-CCNC: 20 U/L — SIGNIFICANT CHANGE UP
BASOPHILS # BLD AUTO: 0.05 K/UL — SIGNIFICANT CHANGE UP (ref 0–0.2)
BASOPHILS NFR BLD AUTO: 1 % — SIGNIFICANT CHANGE UP (ref 0–2)
BILIRUB SERPL-MCNC: 0.6 MG/DL — SIGNIFICANT CHANGE UP (ref 0.4–2)
BLD GP AB SCN SERPL QL: SIGNIFICANT CHANGE UP
BUN SERPL-MCNC: 14.7 MG/DL — SIGNIFICANT CHANGE UP (ref 8–20)
CALCIUM SERPL-MCNC: 9.8 MG/DL — SIGNIFICANT CHANGE UP (ref 8.4–10.5)
CHLORIDE SERPL-SCNC: 99 MMOL/L — SIGNIFICANT CHANGE UP (ref 96–108)
CO2 SERPL-SCNC: 28 MMOL/L — SIGNIFICANT CHANGE UP (ref 22–29)
CREAT SERPL-MCNC: 0.58 MG/DL — SIGNIFICANT CHANGE UP (ref 0.5–1.3)
EGFR: 99 ML/MIN/1.73M2 — SIGNIFICANT CHANGE UP
EOSINOPHIL # BLD AUTO: 0.05 K/UL — SIGNIFICANT CHANGE UP (ref 0–0.5)
EOSINOPHIL NFR BLD AUTO: 1 % — SIGNIFICANT CHANGE UP (ref 0–6)
ESTIMATED AVERAGE GLUCOSE: 105 MG/DL — SIGNIFICANT CHANGE UP (ref 68–114)
GLUCOSE SERPL-MCNC: 95 MG/DL — SIGNIFICANT CHANGE UP (ref 70–99)
HCT VFR BLD CALC: 43.5 % — SIGNIFICANT CHANGE UP (ref 34.5–45)
HGB BLD-MCNC: 15.1 G/DL — SIGNIFICANT CHANGE UP (ref 11.5–15.5)
IMM GRANULOCYTES NFR BLD AUTO: 0.6 % — SIGNIFICANT CHANGE UP (ref 0–0.9)
INR BLD: 1 RATIO — SIGNIFICANT CHANGE UP (ref 0.85–1.18)
LYMPHOCYTES # BLD AUTO: 1.16 K/UL — SIGNIFICANT CHANGE UP (ref 1–3.3)
LYMPHOCYTES # BLD AUTO: 23 % — SIGNIFICANT CHANGE UP (ref 13–44)
MCHC RBC-ENTMCNC: 29.2 PG — SIGNIFICANT CHANGE UP (ref 27–34)
MCHC RBC-ENTMCNC: 34.7 GM/DL — SIGNIFICANT CHANGE UP (ref 32–36)
MCV RBC AUTO: 84 FL — SIGNIFICANT CHANGE UP (ref 80–100)
MONOCYTES # BLD AUTO: 0.45 K/UL — SIGNIFICANT CHANGE UP (ref 0–0.9)
MONOCYTES NFR BLD AUTO: 8.9 % — SIGNIFICANT CHANGE UP (ref 2–14)
MRSA PCR RESULT.: SIGNIFICANT CHANGE UP
NEUTROPHILS # BLD AUTO: 3.3 K/UL — SIGNIFICANT CHANGE UP (ref 1.8–7.4)
NEUTROPHILS NFR BLD AUTO: 65.5 % — SIGNIFICANT CHANGE UP (ref 43–77)
PLATELET # BLD AUTO: 291 K/UL — SIGNIFICANT CHANGE UP (ref 150–400)
POTASSIUM SERPL-MCNC: 4.3 MMOL/L — SIGNIFICANT CHANGE UP (ref 3.5–5.3)
POTASSIUM SERPL-SCNC: 4.3 MMOL/L — SIGNIFICANT CHANGE UP (ref 3.5–5.3)
PROT SERPL-MCNC: 7 G/DL — SIGNIFICANT CHANGE UP (ref 6.6–8.7)
PROTHROM AB SERPL-ACNC: 11.1 SEC — SIGNIFICANT CHANGE UP (ref 9.5–13)
RBC # BLD: 5.18 M/UL — SIGNIFICANT CHANGE UP (ref 3.8–5.2)
RBC # FLD: 13.2 % — SIGNIFICANT CHANGE UP (ref 10.3–14.5)
S AUREUS DNA NOSE QL NAA+PROBE: SIGNIFICANT CHANGE UP
SODIUM SERPL-SCNC: 138 MMOL/L — SIGNIFICANT CHANGE UP (ref 135–145)
WBC # BLD: 5.04 K/UL — SIGNIFICANT CHANGE UP (ref 3.8–10.5)
WBC # FLD AUTO: 5.04 K/UL — SIGNIFICANT CHANGE UP (ref 3.8–10.5)

## 2024-04-03 PROCEDURE — G0463: CPT

## 2024-04-03 PROCEDURE — 93010 ELECTROCARDIOGRAM REPORT: CPT

## 2024-04-03 PROCEDURE — 93005 ELECTROCARDIOGRAM TRACING: CPT

## 2024-04-03 RX ORDER — FOLIC ACID 0.8 MG
1 TABLET ORAL
Qty: 0 | Refills: 0 | DISCHARGE

## 2024-04-03 NOTE — H&P PST ADULT - ASSESSMENT
Patient was given information on joint class, joint book provided, ERP protocol reviewed with patient, MSSA/MRSA swabbed in PST, results pending and pt. verbalized agreement and understanding. Patient educated on surgical scrub, preadmission instructions, medical clearance and day of procedure medications, pt. verbalizes understanding and agreement. Pt. to have medical clearance with  and pt. verbalized agreement and understanding. Pt. to have cardiac clearance with and pt. verbalized agreement and understanding.     CAPRINI SCORE    AGE RELATED RISK FACTORS                                                             [ ] Age 41-60 years                                            (1 Point)  [x ] Age: 61-74 years                                           (2 Points)                 [ ] Age= 75 years                                                (3 Points)             DISEASE RELATED RISK FACTORS                                                       [ ] Edema in the lower extremities                 (1 Point)                     [ ] Varicose veins                                               (1 Point)                                 [ x] BMI > 25 Kg/m2                                            (1 Point)                                  [ ] Serious infection (ie PNA)                            (1 Point)                     [ ] Lung disease ( COPD, Emphysema)            (1 Point)                                                                          [ ] Acute myocardial infarction                         (1 Point)                  [ ] Congestive heart failure (in the previous month)  (1 Point)         [ ] Inflammatory bowel disease                            (1 Point)                  [ ] Central venous access, PICC or Port               (2 points)       (within the last month)                                                                [ ] Stroke (in the previous month)                        (5 Points)    [ ] Previous or present malignancy                       (2 points)                                                                                                                                                         HEMATOLOGY RELATED FACTORS                                                         [ ] Prior episodes of VTE                                     (3 Points)                     [ ] Positive family history for VTE                      (3 Points)                  [ ] Prothrombin 77323 A                                     (3 Points)                     [ ] Factor V Leiden                                                (3 Points)                        [ ] Lupus anticoagulants                                      (3 Points)                                                           [ ] Anticardiolipin antibodies                              (3 Points)                                                       [ ] High homocysteine in the blood                   (3 Points)                                             [ ] Other congenital or acquired thrombophilia      (3 Points)                                                [ ] Heparin induced thrombocytopenia                  (3 Points)                                        MOBILITY RELATED FACTORS  [ ] Bed rest                                                         (1 Point)  [ ] Plaster cast                                                    (2 points)  [ ] Bed bound for more than 72 hours           (2 Points)    GENDER SPECIFIC FACTORS  [ ] Pregnancy or had a baby within the last month   (1 Point)  [ ] Post-partum < 6 weeks                                   (1 Point)  [ ] Hormonal therapy  or oral contraception   (1 Point)  [ ] History of pregnancy complications              (1 point)  [ ] Unexplained or recurrent              (1 Point)    OTHER RISK FACTORS                                           (1 Point)  [ x] BMI >40, smoking, diabetes requiring insulin, chemotherapy  blood transfusions and length of surgery over 2 hours    SURGERY RELATED RISK FACTORS  [ ]  Section within the last month     (1 Point)  [ ] Minor surgery                                                  (1 Point)  [ ] Arthroscopic surgery                                       (2 Points)  [x ] Planned major surgery lasting more            (2 Points)      than 45 minutes     [x ] Elective hip or knee joint replacement       (5 points)       surgery                                                TRAUMA RELATED RISK FACTORS  [ ] Fracture of the hip, pelvis, or leg                       (5 Points)  [ ] Spinal cord injury resulting in paralysis             (5 points)       (in the previous month)    [ ] Paralysis  (less than 1 month)                             (5 Points)  [ ] Multiple Trauma within 1 month                        (5 Points)    Total Score [   11     ]    Caprini Score 0-2: Low Risk, NO VTE prophylaxis required for most patients, encourage ambulation  Caprini Score 3-6: Moderate Risk , pharmacologic VTE prophylaxis is indicated for most patients (in the absence of contraindications)  Caprini Score Greater than or =7: High risk, pharmocologic VTE prophylaxis indicated for most patients (in the absence of contraindications)    OPIOID RISK TOOL    GARRICK EACH BOX THAT APPLIES AND ADD TOTALS AT THE END    FAMILY HISTORY OF SUBSTANCE ABUSE                 FEMALE         MALE                                                Alcohol                             [  ]1 pt          [  ]3pts                                               Illegal Durgs                     [  ]2 pts        [  ]3pts                                               Rx Drugs                           [  ]4 pts        [  ]4 pts    PERSONAL HISTORY OF SUBSTANCE ABUSE                                                                                          Alcohol                             [  ]3 pts       [  ]3 pts                                               Illegal Drugs                     [  ]4 pts        [  ]4 pts                                               Rx Drugs                           [  ]5 pts        [  ]5 pts    AGE BETWEEN 16-45 YEARS                                      [  ]1 pt         [  ]1 pt    HISTORY OF PREADOLESCENT   SEXUAL ABUSE                                                             [  ]3 pts        [  ]0pts    PSYCHOLOGICAL DISEASE                     ADD, OCD, Bipolar, Schizophrenia        [  ]2 pts         [  ]2 pts                      Depression                                               [  ]1 pt           [  ]1 pt           SCORING TOTAL   (add numbers and type here)              (**0*)                                     A score of 3 or lower indicated LOW risk for future opioid abuse  A score of 4 to 7 indicated moderate risk for future opioid abuse  A score of 8 or higher indicates a high risk for opioid abuse     Patient was given information on joint class, joint book provided, ERP protocol reviewed with patient, MSSA/MRSA swabbed in PST, results pending and pt. verbalized agreement and understanding. Patient educated on surgical scrub, preadmission instructions, medical clearance and day of procedure medications, pt. verbalizes understanding and agreement. Pt. to have medical clearance with  and pt. verbalized agreement and understanding. Pt. to have cardiac clearance with and pt. verbalized agreement and understanding.     CAPRINI SCORE    AGE RELATED RISK FACTORS                                                             [ ] Age 41-60 years                                            (1 Point)  [x ] Age: 61-74 years                                           (2 Points)                 [ ] Age= 75 years                                                (3 Points)             DISEASE RELATED RISK FACTORS                                                       [ ] Edema in the lower extremities                 (1 Point)                     [ ] Varicose veins                                               (1 Point)                                 [ x] BMI > 25 Kg/m2                                            (1 Point)                                  [ ] Serious infection (ie PNA)                            (1 Point)                     [ ] Lung disease ( COPD, Emphysema)            (1 Point)                                                                          [ ] Acute myocardial infarction                         (1 Point)                  [ ] Congestive heart failure (in the previous month)  (1 Point)         [ ] Inflammatory bowel disease                            (1 Point)                  [ ] Central venous access, PICC or Port               (2 points)       (within the last month)                                                                [ ] Stroke (in the previous month)                        (5 Points)    [ ] Previous or present malignancy                       (2 points)                                                                                                                                                         HEMATOLOGY RELATED FACTORS                                                         [ ] Prior episodes of VTE                                     (3 Points)                     [ ] Positive family history for VTE                      (3 Points)                  [ ] Prothrombin 12348 A                                     (3 Points)                     [ ] Factor V Leiden                                                (3 Points)                        [ ] Lupus anticoagulants                                      (3 Points)                                                           [ ] Anticardiolipin antibodies                              (3 Points)                                                       [ ] High homocysteine in the blood                   (3 Points)                                             [ ] Other congenital or acquired thrombophilia      (3 Points)                                                [ ] Heparin induced thrombocytopenia                  (3 Points)                                        MOBILITY RELATED FACTORS  [ ] Bed rest                                                         (1 Point)  [ ] Plaster cast                                                    (2 points)  [ ] Bed bound for more than 72 hours           (2 Points)    GENDER SPECIFIC FACTORS  [ ] Pregnancy or had a baby within the last month   (1 Point)  [ ] Post-partum < 6 weeks                                   (1 Point)  [ ] Hormonal therapy  or oral contraception   (1 Point)  [ x] History of pregnancy complications              (1 point)  [ x] Unexplained or recurrent              (1 Point)    OTHER RISK FACTORS                                           (1 Point)  [ x] BMI >40, smoking, diabetes requiring insulin, chemotherapy  blood transfusions and length of surgery over 2 hours    SURGERY RELATED RISK FACTORS  [ ]  Section within the last month     (1 Point)  [ ] Minor surgery                                                  (1 Point)  [ ] Arthroscopic surgery                                       (2 Points)  [x ] Planned major surgery lasting more            (2 Points)      than 45 minutes     [x ] Elective hip or knee joint replacement       (5 points)       surgery                                                TRAUMA RELATED RISK FACTORS  [ ] Fracture of the hip, pelvis, or leg                       (5 Points)  [ ] Spinal cord injury resulting in paralysis             (5 points)       (in the previous month)    [ ] Paralysis  (less than 1 month)                             (5 Points)  [ ] Multiple Trauma within 1 month                        (5 Points)    Total Score [   13    ]    Caprini Score 0-2: Low Risk, NO VTE prophylaxis required for most patients, encourage ambulation  Caprini Score 3-6: Moderate Risk , pharmacologic VTE prophylaxis is indicated for most patients (in the absence of contraindications)  Caprini Score Greater than or =7: High risk, pharmocologic VTE prophylaxis indicated for most patients (in the absence of contraindications)    OPIOID RISK TOOL    GARRICK EACH BOX THAT APPLIES AND ADD TOTALS AT THE END    FAMILY HISTORY OF SUBSTANCE ABUSE                 FEMALE         MALE                                                Alcohol                             [  ]1 pt          [  ]3pts                                               Illegal Durgs                     [  ]2 pts        [  ]3pts                                               Rx Drugs                           [  ]4 pts        [  ]4 pts    PERSONAL HISTORY OF SUBSTANCE ABUSE                                                                                          Alcohol                             [  ]3 pts       [  ]3 pts                                               Illegal Drugs                     [  ]4 pts        [  ]4 pts                                               Rx Drugs                           [  ]5 pts        [  ]5 pts    AGE BETWEEN 16-45 YEARS                                      [  ]1 pt         [  ]1 pt    HISTORY OF PREADOLESCENT   SEXUAL ABUSE                                                             [  ]3 pts        [  ]0pts    PSYCHOLOGICAL DISEASE                     ADD, OCD, Bipolar, Schizophrenia        [  ]2 pts         [  ]2 pts                      Depression                                               [  ]1 pt           [  ]1 pt           SCORING TOTAL   (add numbers and type here)              (**0*)                                     A score of 3 or lower indicated LOW risk for future opioid abuse  A score of 4 to 7 indicated moderate risk for future opioid abuse  A score of 8 or higher indicates a high risk for opioid abuse   69 y/o female presents for PST with a PMH of HTN, UC, GERD, s/p right knee replacement, S/P b/l knee arthroscopy, s/p breast cyst excision, s/p ovarian cystectomy, and obesity. Patient c/o left knee pain for a few years. Pain is rated at a 4/10, dull aching pain. Patient states that pain is worse with walking long distances. Patient states pain is relieved with rest and ice. Patient taking Tylenol as needed for pain control. Patient tried gel infections last injection finished 2024 with no alleviation of symptoms. CT 3/22/24 Left Knee "Impression: Degenerative changes of the left knee." Patient denies CP/SOB, Nausea/vomting, dizziness, numbness/tingling. Patient scheduled for Left Total Knee Replacement with Julio scheduled 2024. Patient was given information on joint class, joint book provided, ERP protocol reviewed with patient, MSSA/MRSA swabbed in PST, results pending and pt. verbalized agreement and understanding. Patient educated on surgical scrub, preadmission instructions, medical clearance and day of procedure medications, pt. verbalizes understanding and agreement. Pt. to have medical clearance with Dr. Yee Evangelista and pt. verbalized agreement and understanding.      CAPRINI SCORE    AGE RELATED RISK FACTORS                                                             [ ] Age 41-60 years                                            (1 Point)  [x ] Age: 61-74 years                                           (2 Points)                 [ ] Age= 75 years                                                (3 Points)             DISEASE RELATED RISK FACTORS                                                       [ ] Edema in the lower extremities                 (1 Point)                     [ ] Varicose veins                                               (1 Point)                                 [ x] BMI > 25 Kg/m2                                            (1 Point)                                  [ ] Serious infection (ie PNA)                            (1 Point)                     [ ] Lung disease ( COPD, Emphysema)            (1 Point)                                                                          [ ] Acute myocardial infarction                         (1 Point)                  [ ] Congestive heart failure (in the previous month)  (1 Point)         [ ] Inflammatory bowel disease                            (1 Point)                  [ ] Central venous access, PICC or Port               (2 points)       (within the last month)                                                                [ ] Stroke (in the previous month)                        (5 Points)    [ ] Previous or present malignancy                       (2 points)                                                                                                                                                         HEMATOLOGY RELATED FACTORS                                                         [ ] Prior episodes of VTE                                     (3 Points)                     [ ] Positive family history for VTE                      (3 Points)                  [ ] Prothrombin 24453 A                                     (3 Points)                     [ ] Factor V Leiden                                                (3 Points)                        [ ] Lupus anticoagulants                                      (3 Points)                                                           [ ] Anticardiolipin antibodies                              (3 Points)                                                       [ ] High homocysteine in the blood                   (3 Points)                                             [ ] Other congenital or acquired thrombophilia      (3 Points)                                                [ ] Heparin induced thrombocytopenia                  (3 Points)                                        MOBILITY RELATED FACTORS  [ ] Bed rest                                                         (1 Point)  [ ] Plaster cast                                                    (2 points)  [ ] Bed bound for more than 72 hours           (2 Points)    GENDER SPECIFIC FACTORS  [ ] Pregnancy or had a baby within the last month   (1 Point)  [ ] Post-partum < 6 weeks                                   (1 Point)  [ ] Hormonal therapy  or oral contraception   (1 Point)  [ x] History of pregnancy complications              (1 point)  [ x] Unexplained or recurrent              (1 Point)    OTHER RISK FACTORS                                           (1 Point)  [ x] BMI >40, smoking, diabetes requiring insulin, chemotherapy  blood transfusions and length of surgery over 2 hours    SURGERY RELATED RISK FACTORS  [ ]  Section within the last month     (1 Point)  [ ] Minor surgery                                                  (1 Point)  [ ] Arthroscopic surgery                                       (2 Points)  [x ] Planned major surgery lasting more            (2 Points)      than 45 minutes     [x ] Elective hip or knee joint replacement       (5 points)       surgery                                                TRAUMA RELATED RISK FACTORS  [ ] Fracture of the hip, pelvis, or leg                       (5 Points)  [ ] Spinal cord injury resulting in paralysis             (5 points)       (in the previous month)    [ ] Paralysis  (less than 1 month)                             (5 Points)  [ ] Multiple Trauma within 1 month                        (5 Points)    Total Score [   13    ]    Caprini Score 0-2: Low Risk, NO VTE prophylaxis required for most patients, encourage ambulation  Caprini Score 3-6: Moderate Risk , pharmacologic VTE prophylaxis is indicated for most patients (in the absence of contraindications)  Caprini Score Greater than or =7: High risk, pharmocologic VTE prophylaxis indicated for most patients (in the absence of contraindications)    OPIOID RISK TOOL    GARRICK EACH BOX THAT APPLIES AND ADD TOTALS AT THE END    FAMILY HISTORY OF SUBSTANCE ABUSE                 FEMALE         MALE                                                Alcohol                             [  ]1 pt          [  ]3pts                                               Illegal Durgs                     [  ]2 pts        [  ]3pts                                               Rx Drugs                           [  ]4 pts        [  ]4 pts    PERSONAL HISTORY OF SUBSTANCE ABUSE                                                                                          Alcohol                             [  ]3 pts       [  ]3 pts                                               Illegal Drugs                     [  ]4 pts        [  ]4 pts                                               Rx Drugs                           [  ]5 pts        [  ]5 pts    AGE BETWEEN 16-45 YEARS                                      [  ]1 pt         [  ]1 pt    HISTORY OF PREADOLESCENT   SEXUAL ABUSE                                                             [  ]3 pts        [  ]0pts    PSYCHOLOGICAL DISEASE                     ADD, OCD, Bipolar, Schizophrenia        [  ]2 pts         [  ]2 pts                      Depression                                               [  ]1 pt           [  ]1 pt           SCORING TOTAL   (add numbers and type here)              (**0*)                                     A score of 3 or lower indicated LOW risk for future opioid abuse  A score of 4 to 7 indicated moderate risk for future opioid abuse  A score of 8 or higher indicates a high risk for opioid abuse

## 2024-04-03 NOTE — H&P PST ADULT - NEGATIVE ENMT SYMPTOMS
no hearing difficulty/no ear pain/no tinnitus/no vertigo/no sinus symptoms/no nasal congestion/no nasal discharge/no nasal obstruction/no post-nasal discharge/no dry mouth/no throat pain/no dysphagia

## 2024-04-03 NOTE — H&P PST ADULT - CARDIOVASCULAR
details… normal/regular rate and rhythm/S1 S2 present/no gallops/no rub/no murmur/no JVD/no pedal edema negative

## 2024-04-03 NOTE — H&P PST ADULT - NEGATIVE GENERAL SYMPTOMS
no fever/no chills/no sweating/no anorexia/no weight loss/no weight gain/no polyphagia/no polyuria/no polydipsia/no malaise no fever/no chills/no sweating/no anorexia/no weight gain/no polyphagia/no polyuria/no polydipsia/no malaise/no fatigue

## 2024-04-03 NOTE — H&P PST ADULT - OTHER CARE PROVIDERS
Dr. Evangelista [phone] PCP Dr. Evangelista [phone] PCP: [facility] Dr. Evangelista [phone] PCP: [facility], Dr. Seqeuira Rheum

## 2024-04-03 NOTE — H&P PST ADULT - NEGATIVE FEMALE-SPECIFIC SYMPTOMS
no irregular menses/no vaginal discharge/no menorrhagia/no spotting/no abnormal vaginal bleeding/no pelvic pain

## 2024-04-03 NOTE — H&P PST ADULT - MUSCULOSKELETAL
no joint erythema/no joint warmth/no calf tenderness/decreased ROM due to pain/joint swelling/normal gait details… no joint erythema/no joint warmth/no calf tenderness/decreased ROM due to pain/joint swelling/normal gait/extremities exam

## 2024-04-03 NOTE — H&P PST ADULT - PROBLEM SELECTOR PLAN 2
Caprini Score 13 High risk,  Surgical team should assess /Strongly recommend pharmacological and mechanical measures for VTE prophylaxis indicated

## 2024-04-03 NOTE — H&P PST ADULT - NSICDXPASTSURGICALHX_GEN_ALL_CORE_FT
PAST SURGICAL HISTORY:  Breast cyst, left excision    History of arthroscopy of both knees (Right 2013, left 04/2016)    History of ovarian cystectomy

## 2024-04-03 NOTE — H&P PST ADULT - PSYCHIATRIC
no nausea/no vomiting no vomiting/no nausea/no cough, no nasal congestion details… normal/normal affect/alert and oriented x3/normal behavior negative

## 2024-04-03 NOTE — H&P PST ADULT - NEGATIVE GENERAL GENITOURINARY SYMPTOMS
no hematuria/no renal colic/no flank pain L/no flank pain R/no urine discoloration/no gas in urine/no bladder infections/no dysuria/no urinary hesitancy/normal urinary frequency/no nocturia no hematuria/no renal colic/no flank pain L/no flank pain R/no urine discoloration/no gas in urine/no bladder infections/no dysuria/no urinary hesitancy/no nocturia

## 2024-04-03 NOTE — H&P PST ADULT - NSICDXPASTMEDICALHX_GEN_ALL_CORE_FT
PAST MEDICAL HISTORY:  GERD (gastroesophageal reflux disease)     HTN (hypertension)     Lumbar herniated disc     Obesity (BMI 30-39.9)     Ulcerative colitis     Unilateral primary osteoarthritis, left knee     Unilateral primary osteoarthritis, right knee

## 2024-04-03 NOTE — H&P PST ADULT - HISTORY OF PRESENT ILLNESS
67 y/o female presents for PST with a PMH of HTN, UC, GERD, obesity.        Patient denies CP/SOB, Nausea/vomting, dizziness. Patient scheduled for Left Total Knee Replacement with Julio scheduled 4/22/2024.     long term hx of bilateral knee pain, S/P b/l knees arthroscopy (2013, 2016). Had physical therapy and multiple cortisone injections with some improvement. Pt stated that pain has been progressively worsening since 04/2016. Now scheduled for right knee partial / total knee replacement on 03/21/17. 67 y/o female presents for PST with a PMH of HTN, UC, GERD, obesity.      CT 3/22/24 Left Knee "Impression: Degenerative changes of the left knee." Patient denies CP/SOB, Nausea/vomting, dizziness. Patient scheduled for Left Total Knee Replacement with Julio scheduled 4/22/2024.     long term hx of bilateral knee pain, S/P b/l knees arthroscopy (2013, 2016). Had physical therapy and multiple cortisone injections with some improvement. Pt stated that pain has been progressively worsening since 04/2016. Now scheduled for right knee partial / total knee replacement on 03/21/17.    History of Present Illness   Patient is a 68-year-old female here today for follow-up of left knee osteoarthritis and right hip pain. The patient has been spearing seeing left knee pain for more than a year. She has undergone multiple injections both gel and cortisone. She has tried at home directed exercises without relief of her pain. She states that her knee sometimes chelsi on her. The pain is not responsive to Tylenol and anti-inflammatories. She would like to discuss surgical options today. As for her right hip she has been having pain for the past few months the pain is located in the lateral aspect of the hip and is very tender to palpation. She has difficulty laying on her right side. She was advised to receive a cortisone injection at her last visit for right hip greater trochanteric bursitis but she has not yet gone for the injection denies any pain in the groin or lower back. Denies any recent injuries falls or trauma, significant changes to her medical history since her last visit, numbness or tingling in the lower extremity.          ISHA TALAVERA presents with pain of the right hip, left knee. She states the symptoms are worsening. The patient mentions the symptoms started year(s) ago.   Her symptoms occur while walking, sitting and standing. She states the symptoms seem to be constant.     Modifying factors - worsened by bending, worsened by hip movement, worsened by sitting, worsened by running, worsened by walking, worsened with knee flexion and worsened with knee extension . putting on socks and shoes, getting in and out of the car, going up and down the stairs, rising from a seated position. Relieving factors include. Diclofenac Gel.  69 y/o female presents for PST with a PMH of HTN, UC, GERD, obesity. Patient c/o left knee pain for a few years. Patient tried gel infections last injection finished 2/2024 with no alleviation of symptoms. Pain rated at a 4/10. Patient taking Tylenol as needed for pain control. CT 3/22/24 Left Knee "Impression: Degenerative changes of the left knee." Patient denies CP/SOB, Nausea/vomting, dizziness. Patient scheduled for Left Total Knee Replacement with Julio scheduled 4/22/2024.     long term hx of bilateral knee pain, S/P b/l knees arthroscopy (2013, 2016). Had physical therapy and multiple cortisone injections with some improvement. Pt stated that pain has been progressively worsening since 04/2016. Now scheduled for right knee partial / total knee replacement on 03/21/17.    History of Present Illness   Patient is a 68-year-old female here today for follow-up of left knee osteoarthritis and right hip pain. The patient has been spearing seeing left knee pain for more than a year. She has undergone multiple injections both gel and cortisone. She has tried at home directed exercises without relief of her pain. She states that her knee sometimes chelsi on her. The pain is not responsive to Tylenol and anti-inflammatories. She would like to discuss surgical options today. As for her right hip she has been having pain for the past few months the pain is located in the lateral aspect of the hip and is very tender to palpation. She has difficulty laying on her right side. She was advised to receive a cortisone injection at her last visit for right hip greater trochanteric bursitis but she has not yet gone for the injection denies any pain in the groin or lower back. Denies any recent injuries falls or trauma, significant changes to her medical history since her last visit, numbness or tingling in the lower extremity.          ISHA TALAVERA presents with pain of the right hip, left knee. She states the symptoms are worsening. The patient mentions the symptoms started year(s) ago.   Her symptoms occur while walking, sitting and standing. She states the symptoms seem to be constant.     Modifying factors - worsened by bending, worsened by hip movement, worsened by sitting, worsened by running, worsened by walking, worsened with knee flexion and worsened with knee extension . putting on socks and shoes, getting in and out of the car, going up and down the stairs, rising from a seated position. Relieving factors include. Diclofenac Gel.  69 y/o female presents for PST with a PMH of HTN, UC, GERD, s/p right knee replacement, S/P b/l knee arthroscopy, s/p breast cyst excision, s/p ovarian cystectomy, and obesity. Patient c/o left knee pain for a few years. Pain is rated at a 4/10, dull aching pain. Patient states that pain is worse with walking long distances. Patient states pain is relieved with rest and ice. Patient taking Tylenol as needed for pain control. Patient tried gel infections last injection finished 2/2024 with no alleviation of symptoms. CT 3/22/24 Left Knee "Impression: Degenerative changes of the left knee." Patient denies CP/SOB, Nausea/vomting, dizziness, numbness/tingling. Patient scheduled for Left Total Knee Replacement with Julio scheduled 4/22/2024.

## 2024-04-03 NOTE — H&P PST ADULT - GASTROINTESTINAL
normal/soft/nontender/nondistended/normal active bowel sounds/no guarding/no rigidity/no organomegaly/no palpable aundrea/no masses palpable details…

## 2024-04-03 NOTE — H&P PST ADULT - NEGATIVE OPHTHALMOLOGIC SYMPTOMS
no diplopia/no photophobia/no lacrimation L/no lacrimation R/no blurred vision L/no blurred vision R/no discharge L/no discharge R/no pain L/no pain R/no irritation L/no irritation R/no loss of vision L/no loss of vision R/no scleral injection L/no scleral injection R no diplopia/no photophobia/no lacrimation L/no lacrimation R/no blurred vision L/no blurred vision R/no discharge L/no discharge R/no pain L/no pain R/no irritation L/no irritation R/no scleral injection L/no scleral injection R

## 2024-04-03 NOTE — H&P PST ADULT - PROBLEM SELECTOR PLAN 3
Patient told to take last dose of Losartan 4/20/24, Last dose HCTZ 4/21/24 as usual prior to surdery. Pt /70 today. Patient pending medical clearance.

## 2024-04-03 NOTE — H&P PST ADULT - PROBLEM SELECTOR PLAN 5
Patient educated to take last dose of Ozempic 4/8/24 prior to surgery. Patient agrees and understands.

## 2024-04-03 NOTE — H&P PST ADULT - MS GEN HX ROS MEA POS PC
elbows, shoulders, knees right > left/arthritis/joint swelling/joint pain/muscle cramps/stiffness/back pain left Knee pain anterior, rest and ice helps with pain, walking distances exacerbates symptoms/arthritis/joint swelling/joint pain/muscle cramps/stiffness/back pain

## 2024-04-03 NOTE — H&P PST ADULT - NS HP PST ANES REACTION OTHER FT
Sister had a cardiac arrest during breast augmentation surgery, unk cause thought to be r/t anesthesia Sister had a cardiac arrest during breast augmentation surgery, unk cause thought to be r/t anesthesia, noted on anesthesia record

## 2024-04-03 NOTE — H&P PST ADULT - NEGATIVE GASTROINTESTINAL SYMPTOMS
no nausea/no vomiting/no constipation/no change in bowel habits no nausea/no vomiting/no constipation/no change in bowel habits/no flatulence/no abdominal pain/no melena/no hematochezia/no steatorrhea/no jaundice

## 2024-04-03 NOTE — H&P PST ADULT - NEGATIVE NEUROLOGICAL SYMPTOMS
no weakness/no paresthesias/no generalized seizures/no focal seizures/no syncope/no tremors/no vertigo/no loss of sensation/no headache/no loss of consciousness/no hemiparesis/no facial palsy

## 2024-04-03 NOTE — H&P PST ADULT - NSICDXFAMILYHX_GEN_ALL_CORE_FT
FAMILY HISTORY:  Father  Still living? Unknown  FH: HTN (hypertension), Age at diagnosis: Age Unknown    Mother  Still living? Unknown  FH: kidney cancer, Age at diagnosis: Age Unknown    Sibling  Still living? Unknown  Family history of cardiac arrest, Age at diagnosis: 31-40    Grandparent  Still living? Unknown  FH: stroke, Age at diagnosis: Age Unknown

## 2024-04-08 NOTE — PROVIDER CONTACT NOTE (OTHER) - SITUATION
Attended joint education session on 3/21/2024 via online method with opportunity to ask questions. Contact information for follow up questions given.

## 2024-04-09 ENCOUNTER — APPOINTMENT (OUTPATIENT)
Dept: ORTHOPEDIC SURGERY | Facility: CLINIC | Age: 69
End: 2024-04-09
Payer: MEDICARE

## 2024-04-09 PROCEDURE — 99441: CPT

## 2024-04-11 ENCOUNTER — APPOINTMENT (OUTPATIENT)
Dept: RHEUMATOLOGY | Facility: CLINIC | Age: 69
End: 2024-04-11
Payer: MEDICARE

## 2024-04-11 VITALS
OXYGEN SATURATION: 97 % | HEIGHT: 61 IN | WEIGHT: 210 LBS | RESPIRATION RATE: 17 BRPM | SYSTOLIC BLOOD PRESSURE: 120 MMHG | BODY MASS INDEX: 39.65 KG/M2 | HEART RATE: 97 BPM | TEMPERATURE: 98 F | DIASTOLIC BLOOD PRESSURE: 80 MMHG

## 2024-04-11 DIAGNOSIS — M85.80 OTHER SPECIFIED DISORDERS OF BONE DENSITY AND STRUCTURE, UNSPECIFIED SITE: ICD-10-CM

## 2024-04-11 DIAGNOSIS — R76.8 OTHER SPECIFIED ABNORMAL IMMUNOLOGICAL FINDINGS IN SERUM: ICD-10-CM

## 2024-04-11 DIAGNOSIS — M77.8 OTHER ENTHESOPATHIES, NOT ELSEWHERE CLASSIFIED: ICD-10-CM

## 2024-04-11 DIAGNOSIS — M75.22 BICIPITAL TENDINITIS, LEFT SHOULDER: ICD-10-CM

## 2024-04-11 DIAGNOSIS — M70.61 TROCHANTERIC BURSITIS, RIGHT HIP: ICD-10-CM

## 2024-04-11 DIAGNOSIS — M79.7 FIBROMYALGIA: ICD-10-CM

## 2024-04-11 DIAGNOSIS — M75.21 BICIPITAL TENDINITIS, RIGHT SHOULDER: ICD-10-CM

## 2024-04-11 DIAGNOSIS — M15.9 POLYOSTEOARTHRITIS, UNSPECIFIED: ICD-10-CM

## 2024-04-11 DIAGNOSIS — M25.551 PAIN IN RIGHT HIP: ICD-10-CM

## 2024-04-11 PROCEDURE — 99215 OFFICE O/P EST HI 40 MIN: CPT

## 2024-04-11 PROCEDURE — G2211 COMPLEX E/M VISIT ADD ON: CPT

## 2024-04-11 RX ORDER — SODIUM SULFATE, MAGNESIUM SULFATE, AND POTASSIUM CHLORIDE 17.75; 2.7; 2.25 G/1; G/1; G/1
1479-225-188 TABLET ORAL
Qty: 24 | Refills: 0 | Status: DISCONTINUED | COMMUNITY
Start: 2024-02-19 | End: 2024-04-11

## 2024-04-11 RX ORDER — GLUCOSAMINE HCL/CHONDROITIN SU 500-400 MG
CAPSULE ORAL
Refills: 0 | Status: DISCONTINUED | COMMUNITY
End: 2024-04-11

## 2024-04-11 RX ORDER — TERCONAZOLE 8 MG/G
0.8 CREAM VAGINAL
Qty: 1 | Refills: 0 | Status: DISCONTINUED | COMMUNITY
Start: 2023-06-20 | End: 2024-04-11

## 2024-04-12 RX ORDER — PREGABALIN 50 MG/1
50 CAPSULE ORAL
Qty: 60 | Refills: 3 | Status: ACTIVE | COMMUNITY
Start: 2023-07-27 | End: 1900-01-01

## 2024-04-12 RX ORDER — DICLOFENAC SODIUM 1% 10 MG/G
1 GEL TOPICAL
Qty: 1 | Refills: 1 | Status: DISCONTINUED | COMMUNITY
Start: 2024-01-02 | End: 2024-04-12

## 2024-04-12 RX ORDER — IBUPROFEN 200 MG
600 CAPSULE ORAL
Qty: 60 | Refills: 5 | Status: DISCONTINUED | COMMUNITY
Start: 2019-06-20 | End: 2024-04-12

## 2024-04-12 RX ORDER — PREDNISONE 10 MG/1
10 TABLET ORAL
Qty: 21 | Refills: 0 | Status: ACTIVE | COMMUNITY
Start: 2024-04-12 | End: 1900-01-01

## 2024-04-12 RX ORDER — DICLOFENAC SODIUM 1% 10 MG/G
1 GEL TOPICAL
Qty: 6 | Refills: 0 | Status: ACTIVE | COMMUNITY
Start: 2024-04-12

## 2024-04-12 RX ORDER — ADHESIVE TAPE 3"X 2.3 YD
50 MCG TAPE, NON-MEDICATED TOPICAL
Qty: 30 | Refills: 0 | Status: DISCONTINUED | COMMUNITY
Start: 2024-01-20 | End: 2024-04-12

## 2024-04-12 RX ORDER — CELECOXIB 200 MG/1
200 CAPSULE ORAL
Qty: 90 | Refills: 0 | Status: ACTIVE | COMMUNITY
Start: 2024-04-12 | End: 1900-01-01

## 2024-04-12 RX ORDER — ACETAMINOPHEN 500 MG/1
500 TABLET, COATED ORAL
Qty: 100 | Refills: 0 | Status: ACTIVE | COMMUNITY
Start: 2024-04-12

## 2024-04-12 NOTE — HISTORY OF PRESENT ILLNESS
[FreeTextEntry1] : 68 year-old woman for follow up office visit regarding her joint symptoms and rheumatic diseases, including osteoarthritis, significant osteopenia, fibromyalgia, chronic low back pain.   The pt notes intermittent pain on the  lateral aspect of the right hip primarily.  This is very bothersome to her.  The pt denies radiating pain. The pt denies trauma. The pt notes pain of the left knee (scheduled for left TKA in 2 weeks with Dr. Guzman) and base of both thumbs (L>R). The pt's right flank pain has resolved. The pt had an MRI of the right hip recently ordered by Dr. Guzman. The pt notes the MRI was unremarkable. The pt had a right hip trochanteric bursa US-guided injection of steroid which helped for only 2 or 3 days. The pt denies recent sleep disturbance and fatigue. The pt had a left de Quervain's injection with relief with Dr. Guzman. The pt stopped taking calcium, for unclear reasons. She continues multivitamins, and vitamin D supplements.  Annual IV Zoledronate (last August 25, 2023) without complication or side effect. Tylenol 500 mg q.d p.r.n. with some relief. Patient denies rash or side effects with current medications.   PMH Mentions swelling of the bilateral feet by the end of the day- for 7 months - PCP sent her to vascular surgeon who diagnosed varicose veins and water retention.

## 2024-04-12 NOTE — ASSESSMENT
[FreeTextEntry1] : Impression: 68 year-old woman for follow up office visit regarding her joint symptoms and rheumatic diseases, including osteoarthritis, significant osteopenia, fibromyalgia, chronic low back pain.  The pt notes intermittent pain on the  lateral aspect of the right hip primarily secondary to right trochanteric bursitis and she also has labral tear contributing to the pain in that right hip. The pt denies radiating pain. The pt denies trauma. The pt notes pain of the left knee (scheduled for left TKA in 2 weeks with Dr. Guzman) and base of both thumbs (L>R) secondary to osteoarthritis. The pt cannot have NSAIDs after April 15th until the surgery. The pt's right flank pain has resolved. The pt had an MRI of the right hip recently ordered by Dr. Guzman. The pt notes the MRI was unremarkable. The pt had a right hip trochanteric bursa US-guided injection which helped for only 2 or 3 days. The pt denies recent sleep disturbance and fatigue with her fibromyalgia improved. The pt had a left de Quervain's injection with relief with Dr. Guzman. on PE, the pt has tenderness left 1st CMC joints, crepitus bilateral first CMC joints. The pt stopped taking calcium for unclear reasons. She continues multivitamins, and vitamin D supplements and annual IV Zoledronate (last August 25, 2023) without complication or side effect for her significant osteopenia. Tylenol 500 mg q.d p.r.n. with some relief. Recent MRI of the right hip reveals tendinitis, labral tear, right ovarian cyst known to pt, otherwise normal with extensive discussion. Recent Lab work was unrevealing with extensive discussion.  Patient denies rash or side effects with current medications.    Plan: I reviewed chart and previous records I reviewed her previous lab results with the patient with extensive discussion I reviewed recent MRI right hip results with patient with extensive discussion  Laboratory tests ordered today-see list below-- with coordination of care Diagnosis and prognosis discussed Continue other current medications (other than those changed below) Restart calcium 600 mg twice daily at end of meals (Possible side effects explained) Celebrex 200 mg B.I.D end of breakfast and supper for a week--- she will take it for only 3 days because orthopedics requested that she stop all NSAIDs after April 15 in preparation for her TKA in 2 weeks; after the surgery and after clearance by the orthopedic surgeon, she should restart it 1 tablet daily at the end of breakfast (Possible side effects explained including cardiovascular risk/MI/CVA) If ok with Orthopedics, Prednisone 10 mg q.i.d. with food x3 days, t.i.d. x3 days, then stop (Possible side effects explained including extensive discussion regarding risks including AVN requiring joint replacement) Apply the diclofenac gel 2 g to the lateral aspect of her right hip 3 times daily--she already has it at home (Possible side effects explained including cardiovascular risk/MI/CVA) Increase Tylenol 1000 mg t.i.d. p.r.n. or Tylenol 1300 mg b.i.d. p.r.n. (possible side effects explained) Artificial tears one drop each eye q.i.d. and p.r.n.(Possible side effects explained) Biotin mouthwash/spray q.i.d. and p.r.n.(Possible side effects explained) Oral hydration Daily exercise starting at 10 minutes per day, gradually increasing to at least 30 minutes per day--reemphasized Return visit 3 months All questions and concerns were addressed

## 2024-04-12 NOTE — PHYSICAL EXAM
[General Appearance - Alert] : alert [General Appearance - In No Acute Distress] : in no acute distress [General Appearance - Well Nourished] : well nourished [General Appearance - Well Developed] : well developed [General Appearance - Well-Appearing] : healthy appearing [Sclera] : the sclera and conjunctiva were normal [PERRL With Normal Accommodation] : pupils were equal in size, round, and reactive to light [Extraocular Movements] : extraocular movements were intact [Outer Ear] : the ears and nose were normal in appearance [Neck Appearance] : the appearance of the neck was normal [Neck Cervical Mass (___cm)] : no neck mass was observed [Jugular Venous Distention Increased] : there was no jugular-venous distention [Thyroid Diffuse Enlargement] : the thyroid was not enlarged [Lungs Percussion] : the lungs were normal to percussion [Heart Rate And Rhythm] : heart rate was normal and rhythm regular [Edema] : there was no peripheral edema [Abdomen Soft] : soft [Abdomen Tenderness] : non-tender [Abdomen Mass (___ Cm)] : no abdominal mass palpated [Cervical Lymph Nodes Enlarged Posterior Bilaterally] : posterior cervical [Cervical Lymph Nodes Enlarged Anterior Bilaterally] : anterior cervical [Supraclavicular Lymph Nodes Enlarged Bilaterally] : supraclavicular [Axillary Lymph Nodes Enlarged Bilaterally] : axillary [No CVA Tenderness] : no ~M costovertebral angle tenderness [No Spinal Tenderness] : no spinal tenderness [Skin Color & Pigmentation] : normal skin color and pigmentation [Skin Turgor] : normal skin turgor [] : no rash [Cranial Nerves] : cranial nerves 2-12 were intact [Sensation] : the sensory exam was normal to light touch and pinprick [Motor Exam] : the motor exam was normal [No Focal Deficits] : no focal deficits [Oriented To Time, Place, And Person] : oriented to person, place, and time [Impaired Insight] : insight and judgment were intact [Mood] : the mood was normal [Affect] : the affect was normal [FreeTextEntry1] : Strength- 5/5

## 2024-04-12 NOTE — ADDENDUM
[FreeTextEntry1] :  I, Nikki Reyes, acted solely as a scribe for Dr. Myron I. Kleiner, MD. on 04/11/2024.

## 2024-04-12 NOTE — CONSULT LETTER
[Dear  ___] : Dear  [unfilled], [Consult Letter:] : I had the pleasure of evaluating your patient, [unfilled]. [Please see my note below.] : Please see my note below. [Consult Closing:] : Thank you very much for allowing me to participate in the care of this patient.  If you have any questions, please do not hesitate to contact me. [Sincerely,] : Sincerely, [DrDaniele  ___] : Dr. ORTIZ [FreeTextEntry3] : Ramesh\par  Myron I. Kleiner, M.D., FACR \par  Chief, Division of Rheumatology\par  Department of Medicine \par  [facility]  [DrDaniele ___] : Dr. ORTIZ

## 2024-04-18 ENCOUNTER — APPOINTMENT (OUTPATIENT)
Dept: ORTHOPEDIC SURGERY | Facility: CLINIC | Age: 69
End: 2024-04-18

## 2024-04-18 RX ORDER — POVIDONE-IODINE 5 %
1 AEROSOL (ML) TOPICAL ONCE
Refills: 0 | Status: COMPLETED | OUTPATIENT
Start: 2024-04-22 | End: 2024-04-22

## 2024-04-19 ENCOUNTER — NON-APPOINTMENT (OUTPATIENT)
Age: 69
End: 2024-04-19

## 2024-04-21 ENCOUNTER — TRANSCRIPTION ENCOUNTER (OUTPATIENT)
Age: 69
End: 2024-04-21

## 2024-04-21 LAB
ALBUMIN SERPL ELPH-MCNC: 4.7 G/DL
ALP BLD-CCNC: 61 U/L
ALT SERPL-CCNC: 19 U/L
ANION GAP SERPL CALC-SCNC: 16 MMOL/L
APPEARANCE: CLEAR
AST SERPL-CCNC: 14 U/L
BACTERIA: ABNORMAL /HPF
BASOPHILS # BLD AUTO: 0.03 K/UL
BASOPHILS NFR BLD AUTO: 0.3 %
BILIRUB SERPL-MCNC: 0.4 MG/DL
BILIRUBIN URINE: ABNORMAL
BLOOD URINE: NEGATIVE
BUN SERPL-MCNC: 23 MG/DL
CALCIUM SERPL-MCNC: 10.2 MG/DL
CAST: 6 /LPF
CHLORIDE SERPL-SCNC: 99 MMOL/L
CK SERPL-CCNC: 57 U/L
CO2 SERPL-SCNC: 25 MMOL/L
COLOR: ABNORMAL
CREAT SERPL-MCNC: 0.75 MG/DL
CRP SERPL-MCNC: <3 MG/L
EGFR: 87 ML/MIN/1.73M2
ENA SS-A AB SER IA-ACNC: <0.2 AL
ENA SS-B AB SER IA-ACNC: <0.2 AL
EOSINOPHIL # BLD AUTO: 0.02 K/UL
EOSINOPHIL NFR BLD AUTO: 0.2 %
EPITHELIAL CELLS: 5 /HPF
ERYTHROCYTE [SEDIMENTATION RATE] IN BLOOD BY WESTERGREN METHOD: 15 MM/HR
GLUCOSE QUALITATIVE U: NEGATIVE MG/DL
GLUCOSE SERPL-MCNC: 89 MG/DL
HCT VFR BLD CALC: 45.1 %
HGB BLD-MCNC: 15.2 G/DL
IMM GRANULOCYTES NFR BLD AUTO: 1.3 %
KETONES URINE: 15 MG/DL
LDH SERPL-CCNC: 216 U/L
LEUKOCYTE ESTERASE URINE: ABNORMAL
LYMPHOCYTES # BLD AUTO: 1.21 K/UL
LYMPHOCYTES NFR BLD AUTO: 13.8 %
MAGNESIUM SERPL-MCNC: 2.3 MG/DL
MAN DIFF?: NORMAL
MCHC RBC-ENTMCNC: 28.1 PG
MCHC RBC-ENTMCNC: 33.7 GM/DL
MCV RBC AUTO: 83.4 FL
MICROSCOPIC-UA: NORMAL
MONOCYTES # BLD AUTO: 1.07 K/UL
MONOCYTES NFR BLD AUTO: 12.2 %
NEUTROPHILS # BLD AUTO: 6.36 K/UL
NEUTROPHILS NFR BLD AUTO: 72.2 %
NITRITE URINE: POSITIVE
PH URINE: 6
PHOSPHATE SERPL-MCNC: 3.2 MG/DL
PLATELET # BLD AUTO: 357 K/UL
POTASSIUM SERPL-SCNC: 4 MMOL/L
PROT SERPL-MCNC: 7.1 G/DL
PROTEIN URINE: 30 MG/DL
RBC # BLD: 5.41 M/UL
RBC # FLD: 13.7 %
RED BLOOD CELLS URINE: NORMAL /HPF
REVIEW: NORMAL
SODIUM SERPL-SCNC: 139 MMOL/L
SPECIFIC GRAVITY URINE: >1.03
UROBILINOGEN URINE: 1 MG/DL
WBC # FLD AUTO: 8.8 K/UL
WHITE BLOOD CELLS URINE: 1 /HPF

## 2024-04-22 ENCOUNTER — TRANSCRIPTION ENCOUNTER (OUTPATIENT)
Age: 69
End: 2024-04-22

## 2024-04-22 ENCOUNTER — APPOINTMENT (OUTPATIENT)
Dept: ORTHOPEDIC SURGERY | Facility: HOSPITAL | Age: 69
End: 2024-04-22

## 2024-04-22 ENCOUNTER — INPATIENT (INPATIENT)
Facility: HOSPITAL | Age: 69
LOS: 0 days | Discharge: HOME HEALTH | DRG: 470 | End: 2024-04-23
Attending: STUDENT IN AN ORGANIZED HEALTH CARE EDUCATION/TRAINING PROGRAM | Admitting: STUDENT IN AN ORGANIZED HEALTH CARE EDUCATION/TRAINING PROGRAM
Payer: MEDICARE

## 2024-04-22 VITALS
DIASTOLIC BLOOD PRESSURE: 66 MMHG | HEART RATE: 72 BPM | WEIGHT: 211.64 LBS | OXYGEN SATURATION: 94 % | RESPIRATION RATE: 16 BRPM | SYSTOLIC BLOOD PRESSURE: 134 MMHG | TEMPERATURE: 98 F

## 2024-04-22 DIAGNOSIS — Z98.890 OTHER SPECIFIED POSTPROCEDURAL STATES: Chronic | ICD-10-CM

## 2024-04-22 DIAGNOSIS — M17.12 UNILATERAL PRIMARY OSTEOARTHRITIS, LEFT KNEE: ICD-10-CM

## 2024-04-22 DIAGNOSIS — N60.02 SOLITARY CYST OF LEFT BREAST: Chronic | ICD-10-CM

## 2024-04-22 LAB — ABO RH CONFIRMATION: SIGNIFICANT CHANGE UP

## 2024-04-22 PROCEDURE — 20985 CPTR-ASST DIR MS PX: CPT

## 2024-04-22 PROCEDURE — 27447 TOTAL KNEE ARTHROPLASTY: CPT | Mod: AS,LT

## 2024-04-22 PROCEDURE — 99222 1ST HOSP IP/OBS MODERATE 55: CPT

## 2024-04-22 PROCEDURE — 73560 X-RAY EXAM OF KNEE 1 OR 2: CPT | Mod: 26,LT

## 2024-04-22 PROCEDURE — 27447 TOTAL KNEE ARTHROPLASTY: CPT | Mod: LT

## 2024-04-22 DEVICE — BASEPLATE TIB TRIATHLON TRITAN SZ 1: Type: IMPLANTABLE DEVICE | Site: LEFT | Status: FUNCTIONAL

## 2024-04-22 DEVICE — MAKO BONE PIN 4MM X 80MM: Type: IMPLANTABLE DEVICE | Site: LEFT | Status: FUNCTIONAL

## 2024-04-22 DEVICE — ZIMMER FEMALE HEX SCREW MAGNETIC 2.5MM X 25MM: Type: IMPLANTABLE DEVICE | Site: LEFT | Status: FUNCTIONAL

## 2024-04-22 DEVICE — INSERT TIB BEARING X3 SZ 1 9MM: Type: IMPLANTABLE DEVICE | Site: LEFT | Status: FUNCTIONAL

## 2024-04-22 DEVICE — MAKO BONE PIN 4MM X 140MM: Type: IMPLANTABLE DEVICE | Site: LEFT | Status: FUNCTIONAL

## 2024-04-22 DEVICE — COMP FEM CR CMNTLSS BEADED W/ PA SZ 2 LT: Type: IMPLANTABLE DEVICE | Site: LEFT | Status: FUNCTIONAL

## 2024-04-22 DEVICE — PATELLA ASYMM TRIATHLON SZ A 32X10MM: Type: IMPLANTABLE DEVICE | Site: LEFT | Status: FUNCTIONAL

## 2024-04-22 RX ORDER — SODIUM CHLORIDE 9 MG/ML
500 INJECTION INTRAMUSCULAR; INTRAVENOUS; SUBCUTANEOUS ONCE
Refills: 0 | Status: COMPLETED | OUTPATIENT
Start: 2024-04-22 | End: 2024-04-22

## 2024-04-22 RX ORDER — HYDROCHLOROTHIAZIDE 25 MG
1 TABLET ORAL
Refills: 0 | DISCHARGE

## 2024-04-22 RX ORDER — HYDROMORPHONE HYDROCHLORIDE 2 MG/ML
0.5 INJECTION INTRAMUSCULAR; INTRAVENOUS; SUBCUTANEOUS
Refills: 0 | Status: DISCONTINUED | OUTPATIENT
Start: 2024-04-22 | End: 2024-04-22

## 2024-04-22 RX ORDER — OXYCODONE HYDROCHLORIDE 5 MG/1
5 TABLET ORAL
Refills: 0 | Status: DISCONTINUED | OUTPATIENT
Start: 2024-04-22 | End: 2024-04-23

## 2024-04-22 RX ORDER — ONDANSETRON 8 MG/1
4 TABLET, FILM COATED ORAL EVERY 6 HOURS
Refills: 0 | Status: DISCONTINUED | OUTPATIENT
Start: 2024-04-22 | End: 2024-04-23

## 2024-04-22 RX ORDER — ACETAMINOPHEN 500 MG
975 TABLET ORAL ONCE
Refills: 0 | Status: COMPLETED | OUTPATIENT
Start: 2024-04-22 | End: 2024-04-22

## 2024-04-22 RX ORDER — APREPITANT 80 MG/1
40 CAPSULE ORAL ONCE
Refills: 0 | Status: COMPLETED | OUTPATIENT
Start: 2024-04-22 | End: 2024-04-22

## 2024-04-22 RX ORDER — ACETAMINOPHEN 500 MG
975 TABLET ORAL EVERY 8 HOURS
Refills: 0 | Status: DISCONTINUED | OUTPATIENT
Start: 2024-04-22 | End: 2024-04-23

## 2024-04-22 RX ORDER — SENNA PLUS 8.6 MG/1
2 TABLET ORAL AT BEDTIME
Refills: 0 | Status: DISCONTINUED | OUTPATIENT
Start: 2024-04-22 | End: 2024-04-23

## 2024-04-22 RX ORDER — TRANEXAMIC ACID 100 MG/ML
1000 INJECTION, SOLUTION INTRAVENOUS ONCE
Refills: 0 | Status: DISCONTINUED | OUTPATIENT
Start: 2024-04-22 | End: 2024-04-22

## 2024-04-22 RX ORDER — SEMAGLUTIDE 0.68 MG/ML
0.5 INJECTION, SOLUTION SUBCUTANEOUS
Refills: 0 | DISCHARGE

## 2024-04-22 RX ORDER — MAGNESIUM HYDROXIDE 400 MG/1
30 TABLET, CHEWABLE ORAL DAILY
Refills: 0 | Status: DISCONTINUED | OUTPATIENT
Start: 2024-04-22 | End: 2024-04-23

## 2024-04-22 RX ORDER — SODIUM CHLORIDE 9 MG/ML
3 INJECTION INTRAMUSCULAR; INTRAVENOUS; SUBCUTANEOUS EVERY 8 HOURS
Refills: 0 | Status: DISCONTINUED | OUTPATIENT
Start: 2024-04-22 | End: 2024-04-22

## 2024-04-22 RX ORDER — SODIUM CHLORIDE 9 MG/ML
1000 INJECTION INTRAMUSCULAR; INTRAVENOUS; SUBCUTANEOUS
Refills: 0 | Status: DISCONTINUED | OUTPATIENT
Start: 2024-04-22 | End: 2024-04-23

## 2024-04-22 RX ORDER — CEFAZOLIN SODIUM 1 G
2000 VIAL (EA) INJECTION
Refills: 0 | Status: COMPLETED | OUTPATIENT
Start: 2024-04-22 | End: 2024-04-22

## 2024-04-22 RX ORDER — SODIUM CHLORIDE 9 MG/ML
1000 INJECTION, SOLUTION INTRAVENOUS
Refills: 0 | Status: DISCONTINUED | OUTPATIENT
Start: 2024-04-22 | End: 2024-04-22

## 2024-04-22 RX ORDER — PANTOPRAZOLE SODIUM 20 MG/1
40 TABLET, DELAYED RELEASE ORAL
Refills: 0 | Status: DISCONTINUED | OUTPATIENT
Start: 2024-04-22 | End: 2024-04-23

## 2024-04-22 RX ORDER — HYDROMORPHONE HYDROCHLORIDE 2 MG/ML
0.25 INJECTION INTRAMUSCULAR; INTRAVENOUS; SUBCUTANEOUS
Refills: 0 | Status: DISCONTINUED | OUTPATIENT
Start: 2024-04-22 | End: 2024-04-22

## 2024-04-22 RX ORDER — CELECOXIB 200 MG/1
200 CAPSULE ORAL EVERY 12 HOURS
Refills: 0 | Status: CANCELLED | OUTPATIENT
Start: 2024-04-24 | End: 2024-04-23

## 2024-04-22 RX ORDER — ASPIRIN/CALCIUM CARB/MAGNESIUM 324 MG
81 TABLET ORAL EVERY 12 HOURS
Refills: 0 | Status: DISCONTINUED | OUTPATIENT
Start: 2024-04-22 | End: 2024-04-23

## 2024-04-22 RX ORDER — KETOROLAC TROMETHAMINE 30 MG/ML
15 SYRINGE (ML) INJECTION ONCE
Refills: 0 | Status: DISCONTINUED | OUTPATIENT
Start: 2024-04-22 | End: 2024-04-22

## 2024-04-22 RX ORDER — POLYETHYLENE GLYCOL 3350 17 G/17G
17 POWDER, FOR SOLUTION ORAL AT BEDTIME
Refills: 0 | Status: DISCONTINUED | OUTPATIENT
Start: 2024-04-22 | End: 2024-04-23

## 2024-04-22 RX ORDER — ONDANSETRON 8 MG/1
4 TABLET, FILM COATED ORAL ONCE
Refills: 0 | Status: DISCONTINUED | OUTPATIENT
Start: 2024-04-22 | End: 2024-04-22

## 2024-04-22 RX ORDER — OXYCODONE HYDROCHLORIDE 5 MG/1
10 TABLET ORAL
Refills: 0 | Status: DISCONTINUED | OUTPATIENT
Start: 2024-04-22 | End: 2024-04-23

## 2024-04-22 RX ORDER — MESALAMINE 400 MG
2 TABLET, DELAYED RELEASE (ENTERIC COATED) ORAL
Refills: 0 | DISCHARGE

## 2024-04-22 RX ORDER — KETOROLAC TROMETHAMINE 30 MG/ML
15 SYRINGE (ML) INJECTION EVERY 6 HOURS
Refills: 0 | Status: DISCONTINUED | OUTPATIENT
Start: 2024-04-22 | End: 2024-04-23

## 2024-04-22 RX ORDER — MESALAMINE 400 MG
1000 TABLET, DELAYED RELEASE (ENTERIC COATED) ORAL THREE TIMES A DAY
Refills: 0 | Status: DISCONTINUED | OUTPATIENT
Start: 2024-04-22 | End: 2024-04-23

## 2024-04-22 RX ORDER — LOSARTAN POTASSIUM 100 MG/1
50 TABLET, FILM COATED ORAL DAILY
Refills: 0 | Status: CANCELLED | OUTPATIENT
Start: 2024-04-24 | End: 2024-04-23

## 2024-04-22 RX ORDER — ACETAMINOPHEN 500 MG
1000 TABLET ORAL ONCE
Refills: 0 | Status: COMPLETED | OUTPATIENT
Start: 2024-04-22 | End: 2024-04-23

## 2024-04-22 RX ORDER — INFLUENZA VIRUS VACCINE 15; 15; 15; 15 UG/.5ML; UG/.5ML; UG/.5ML; UG/.5ML
0.7 SUSPENSION INTRAMUSCULAR ONCE
Refills: 0 | Status: DISCONTINUED | OUTPATIENT
Start: 2024-04-22 | End: 2024-04-23

## 2024-04-22 RX ORDER — CEFAZOLIN SODIUM 1 G
2000 VIAL (EA) INJECTION ONCE
Refills: 0 | Status: DISCONTINUED | OUTPATIENT
Start: 2024-04-22 | End: 2024-04-22

## 2024-04-22 RX ORDER — MESALAMINE 400 MG
2400 TABLET, DELAYED RELEASE (ENTERIC COATED) ORAL DAILY
Refills: 0 | Status: DISCONTINUED | OUTPATIENT
Start: 2024-04-22 | End: 2024-04-22

## 2024-04-22 RX ORDER — ZOLEDRONIC ACID 5 MG/100ML
4 INJECTION, SOLUTION INTRAVENOUS
Refills: 0 | DISCHARGE

## 2024-04-22 RX ORDER — HYDROMORPHONE HYDROCHLORIDE 2 MG/ML
4 INJECTION INTRAMUSCULAR; INTRAVENOUS; SUBCUTANEOUS
Refills: 0 | Status: DISCONTINUED | OUTPATIENT
Start: 2024-04-22 | End: 2024-04-23

## 2024-04-22 RX ORDER — LOSARTAN POTASSIUM 100 MG/1
1 TABLET, FILM COATED ORAL
Qty: 0 | Refills: 0 | DISCHARGE

## 2024-04-22 RX ADMIN — OXYCODONE HYDROCHLORIDE 5 MILLIGRAM(S): 5 TABLET ORAL at 16:15

## 2024-04-22 RX ADMIN — OXYCODONE HYDROCHLORIDE 5 MILLIGRAM(S): 5 TABLET ORAL at 22:34

## 2024-04-22 RX ADMIN — Medication 15 MILLIGRAM(S): at 23:39

## 2024-04-22 RX ADMIN — SODIUM CHLORIDE 500 MILLILITER(S): 9 INJECTION INTRAMUSCULAR; INTRAVENOUS; SUBCUTANEOUS at 10:41

## 2024-04-22 RX ADMIN — Medication 975 MILLIGRAM(S): at 23:34

## 2024-04-22 RX ADMIN — Medication 975 MILLIGRAM(S): at 12:54

## 2024-04-22 RX ADMIN — Medication 2000 MILLIGRAM(S): at 23:39

## 2024-04-22 RX ADMIN — OXYCODONE HYDROCHLORIDE 5 MILLIGRAM(S): 5 TABLET ORAL at 15:44

## 2024-04-22 RX ADMIN — Medication 975 MILLIGRAM(S): at 06:58

## 2024-04-22 RX ADMIN — Medication 975 MILLIGRAM(S): at 13:00

## 2024-04-22 RX ADMIN — Medication 1000 MILLIGRAM(S): at 22:34

## 2024-04-22 RX ADMIN — Medication 15 MILLIGRAM(S): at 17:58

## 2024-04-22 RX ADMIN — Medication 2000 MILLIGRAM(S): at 15:45

## 2024-04-22 RX ADMIN — Medication 15 MILLIGRAM(S): at 17:39

## 2024-04-22 RX ADMIN — Medication 81 MILLIGRAM(S): at 17:39

## 2024-04-22 RX ADMIN — OXYCODONE HYDROCHLORIDE 5 MILLIGRAM(S): 5 TABLET ORAL at 23:34

## 2024-04-22 RX ADMIN — Medication 50 MILLIGRAM(S): at 17:40

## 2024-04-22 RX ADMIN — APREPITANT 40 MILLIGRAM(S): 80 CAPSULE ORAL at 06:58

## 2024-04-22 RX ADMIN — Medication 15 MILLIGRAM(S): at 12:54

## 2024-04-22 RX ADMIN — ONDANSETRON 4 MILLIGRAM(S): 8 TABLET, FILM COATED ORAL at 15:45

## 2024-04-22 RX ADMIN — SODIUM CHLORIDE 75 MILLILITER(S): 9 INJECTION, SOLUTION INTRAVENOUS at 10:41

## 2024-04-22 RX ADMIN — SODIUM CHLORIDE 125 MILLILITER(S): 9 INJECTION INTRAMUSCULAR; INTRAVENOUS; SUBCUTANEOUS at 10:41

## 2024-04-22 RX ADMIN — Medication 15 MILLIGRAM(S): at 13:00

## 2024-04-22 RX ADMIN — SODIUM CHLORIDE 125 MILLILITER(S): 9 INJECTION INTRAMUSCULAR; INTRAVENOUS; SUBCUTANEOUS at 12:56

## 2024-04-22 RX ADMIN — Medication 975 MILLIGRAM(S): at 22:34

## 2024-04-22 RX ADMIN — SENNA PLUS 2 TABLET(S): 8.6 TABLET ORAL at 22:34

## 2024-04-22 RX ADMIN — Medication 1 APPLICATION(S): at 07:25

## 2024-04-22 NOTE — PHYSICAL THERAPY INITIAL EVALUATION ADULT - LEVEL OF INDEPENDENCE: STAND/SIT, REHAB EVAL
Benefits, risks, and possible complications of procedure explained to patient/caregiver who verbalized understanding and gave verbal consent.
supervision

## 2024-04-22 NOTE — DISCHARGE NOTE PROVIDER - CARE PROVIDER_API CALL
Torsten Guzman  Orthopaedic Surgery  [address] [place] [id number]  Phone: [phone]  Fax: [phone]  Follow Up Time:

## 2024-04-22 NOTE — DISCHARGE NOTE PROVIDER - NSDCMRMEDTOKEN_GEN_ALL_CORE_FT
hydroCHLOROthiazide 12.5 mg oral capsule: 1 cap(s) orally once a day (at bedtime)  losartan 50 mg oral tablet: 1 tab(s) orally once a day in am  mesalamine 1.2 g oral delayed release tablet: 2 tab(s) orally once a day  omeprazole 40 mg oral delayed release capsule: 1 cap(s) orally once a day in am  pregabalin 50 mg oral capsule: 1 cap(s) orally 2 times a day  semaglutide 0.5 mg/0.5 mL (0.5 mg dose) subcutaneous solution: 0.5 milligram(s) subcutaneously Mondays counseled to take last dose 4/8/24 prior to surgery 4/22/24  zoledronic acid 4 mg/100 mL intravenous solution: 4 milligram(s) intravenously yearly   Aspirin EC 81 mg oral delayed release tablet: 1 tab(s) orally 2 times a day  CeleBREX 200 mg oral capsule: 1 cap(s) orally 2 times a day  hydroCHLOROthiazide 12.5 mg oral capsule: 1 cap(s) orally once a day (at bedtime)  losartan 50 mg oral tablet: 1 tab(s) orally once a day in am  mesalamine 1.2 g oral delayed release tablet: 2 tab(s) orally once a day  Narcan 4 mg/0.1 mL nasal spray: 4 milligram(s) intranasally once a day use as directed  oxyCODONE 5 mg oral tablet: 1 tab(s) orally every 6 hours as needed for  severe pain MDD: 4  pantoprazole 40 mg oral delayed release tablet: 1 tab(s) orally once a day  pregabalin 50 mg oral capsule: 1 cap(s) orally 2 times a day  semaglutide 0.5 mg/0.5 mL (0.5 mg dose) subcutaneous solution: 0.5 milligram(s) subcutaneously Mondays counseled to take last dose 4/8/24 prior to surgery 4/22/24  Senna S 50 mg-8.6 mg oral tablet: 2 tab(s) orally once a day (at bedtime)  zoledronic acid 4 mg/100 mL intravenous solution: 4 milligram(s) intravenously yearly

## 2024-04-22 NOTE — PATIENT PROFILE ADULT - FALL HARM RISK - FALLEN IN PAST
TUEY CHO 76y Female  MRN#: [id number]       SUBJECTIVE  76 year old female with PMH of CAD s/p stents in 2018, DM, Pancreatic cancer was brought in by family for chest pain. As per the son, she is feeling better today, the chest pain was dull in      OBJECTIVE  PAST MEDICAL & SURGICAL HISTORY  MI (myocardial infarction)  Diabetes  Hypertension  Pancreatic cancer  History of pancreatic surgery    ALLERGIES:  Allergy Status Unknown    MEDICATIONS:  STANDING MEDICATIONS  aspirin enteric coated 81 milliGRAM(s) Oral daily  ATENolol  Tablet 50 milliGRAM(s) Oral daily  atorvastatin 20 milliGRAM(s) Oral at bedtime  dextrose 5%. 1000 milliLiter(s) IV Continuous <Continuous>  dextrose 50% Injectable 12.5 Gram(s) IV Push once  dextrose 50% Injectable 25 Gram(s) IV Push once  dextrose 50% Injectable 25 Gram(s) IV Push once  enoxaparin Injectable 40 milliGRAM(s) SubCutaneous daily  insulin glargine Injectable (LANTUS) 9 Unit(s) SubCutaneous at bedtime  insulin lispro (HumaLOG) corrective regimen sliding scale   SubCutaneous three times a day before meals  insulin lispro Injectable (HumaLOG) 6 Unit(s) SubCutaneous three times a day before meals  losartan 100 milliGRAM(s) Oral daily  pancrelipase  (CREON 12,000 Lipase Units) 2 Capsule(s) Oral three times a day with meals  pantoprazole    Tablet 40 milliGRAM(s) Oral before breakfast  ranolazine 500 milliGRAM(s) Oral two times a day    PRN MEDICATIONS  dextrose 40% Gel 15 Gram(s) Oral once PRN  glucagon  Injectable 1 milliGRAM(s) IntraMuscular once PRN      VITAL SIGNS: Last 24 Hours  T(C): 37.1 (2020 07:33), Max: 38.6 (2020 06:52)  T(F): 98.7 (2020 07:33), Max: 101.5 (2020 06:52)  HR: 84 (2020 07:33) (70 - 89)  BP: 146/78 (2020 07:33) (134/76 - 153/92)  BP(mean): --  RR: 18 (2020 07:33) (16 - 18)  SpO2: 95% (2020 07:33) (95% - 100%)    LABS:                        10.6   6.47  )-----------( 196      ( 2020 06:51 )             32.1         139  |  105  |  20  ----------------------------<  134<H>  4.0   |  22  |  0.7    Ca    9.7      2020 06:51  Mg     1.9     01-15    TPro  7.4  /  Alb  3.6  /  TBili  0.8  /  DBili  x   /  AST  112<H>  /  ALT  59<H>  /  AlkPhos  126<H>      PT/INR - ( 15 Nelson 2020 13:25 )   PT: 11.80 sec;   INR: 1.03 ratio         PTT - ( 15 Nelson 2020 13:25 )  PTT:33.5 sec  Urinalysis Basic - ( 15 Nelson 2020 14:50 )    Color: Yellow / Appearance: Clear / S.009 / pH: x  Gluc: x / Ketone: Negative  / Bili: Negative / Urobili: <2 mg/dL   Blood: x / Protein: 30 mg/dL / Nitrite: Negative   Leuk Esterase: Negative / RBC: 1 /HPF / WBC 1 /HPF   Sq Epi: x / Non Sq Epi: 1 /HPF / Bacteria: Negative        Troponin T, Serum: <0.01 ng/mL (20 @ 00:08)  Troponin T, Serum: <0.01 ng/mL (01-15-20 @ 20:48)  Troponin T, Serum: <0.01 ng/mL (01-15-20 @ 13:25)  Creatine Kinase, Serum: 65 U/L (01-15-20 @ 13:25)      CARDIAC MARKERS ( 2020 00:08 )  x     / <0.01 ng/mL / x     / x     / 1.0 ng/mL  CARDIAC MARKERS ( 15 Nelson 2020 20:48 )  x     / <0.01 ng/mL / x     / x     / <1.0 ng/mL  CARDIAC MARKERS ( 15 Nelson 2020 13:25 )  x     / <0.01 ng/mL / 65 U/L / x     / <1.0 ng/mL      RADIOLOGY:      PHYSICAL EXAM:    GENERAL: NAD, well-developed, AAOx3  HEENT:  Atraumatic, Normocephalic. EOMI, PERRLA, conjunctiva and sclera clear, No JVD  PULMONARY: Clear to auscultation bilaterally; No wheeze  CARDIOVASCULAR: Regular rate and rhythm; No murmurs, rubs, or gallops  GASTROINTESTINAL: Soft, Nontender, Nondistended; Bowel sounds present  MUSCULOSKELETAL:  2+ Peripheral Pulses, No clubbing, cyanosis, or edema  NEUROLOGY: non-focal  SKIN: No rashes or lesions      ADMISSION SUMMARY  Patient is a 76y old Female who presents with a chief complaint of Chest pain (2020 06:33)  Currently admitted to medicine with the primary diagnosis of Chest pain  Hospital course has been complicated by _______.       ASSESSMENT & PLAN    1. CHEST PAIN;URI;FEVER      2.    3. MI (myocardial infarction)  Diabetes  Hypertension  Pancreatic cancer        Present today:  ( ) Congestive Heart Failure, Yes? ( )Acute / ( )Acute on Chronic / ( )Chronic  :  ( )Systolic / ( )Diastolic               Plan:  ( ) Complicated Pneumonia, Type?  ( )Parapneumonic effusion / ( )Abscess / ( ) Multilobar / ( )Other               Plan:  ( ) Morbid Obesity, Yes? BMI:               Plan:  ( ) Functional Quadriplegia               Plan:  ( ) Encephalopathy               Plan:    ( ) Discussion with patient and/or family regarding goals of care  ( ) Discussed Case and Plan with Medical Attending, Name:      # Planned Disposition: ________ TUEY CHO 76y Female  MRN#: [id number]       SUBJECTIVE  76 year old female with PMH of CAD s/p stents in 2018, DM, Pancreatic cancer was brought in by family for chest pain. As per the son, she is feeling better today, the chest pain was radiating to wards the back. Now she is feeling comfortable.     OBJECTIVE  PAST MEDICAL & SURGICAL HISTORY  MI (myocardial infarction)  Diabetes  Hypertension  Pancreatic cancer  History of pancreatic surgery    ALLERGIES:  Allergy Status Unknown    MEDICATIONS:  STANDING MEDICATIONS  aspirin enteric coated 81 milliGRAM(s) Oral daily  ATENolol  Tablet 50 milliGRAM(s) Oral daily  atorvastatin 20 milliGRAM(s) Oral at bedtime  dextrose 5%. 1000 milliLiter(s) IV Continuous <Continuous>  dextrose 50% Injectable 12.5 Gram(s) IV Push once  dextrose 50% Injectable 25 Gram(s) IV Push once  dextrose 50% Injectable 25 Gram(s) IV Push once  enoxaparin Injectable 40 milliGRAM(s) SubCutaneous daily  insulin glargine Injectable (LANTUS) 9 Unit(s) SubCutaneous at bedtime  insulin lispro (HumaLOG) corrective regimen sliding scale   SubCutaneous three times a day before meals  insulin lispro Injectable (HumaLOG) 6 Unit(s) SubCutaneous three times a day before meals  losartan 100 milliGRAM(s) Oral daily  pancrelipase  (CREON 12,000 Lipase Units) 2 Capsule(s) Oral three times a day with meals  pantoprazole    Tablet 40 milliGRAM(s) Oral before breakfast  ranolazine 500 milliGRAM(s) Oral two times a day    PRN MEDICATIONS  dextrose 40% Gel 15 Gram(s) Oral once PRN  glucagon  Injectable 1 milliGRAM(s) IntraMuscular once PRN      VITAL SIGNS: Last 24 Hours  T(C): 37.1 (2020 07:33), Max: 38.6 (2020 06:52)  T(F): 98.7 (2020 07:33), Max: 101.5 (2020 06:52)  HR: 84 (2020 07:33) (70 - 89)  BP: 146/78 (2020 07:33) (134/76 - 153/92)  BP(mean): --  RR: 18 (2020 07:33) (16 - 18)  SpO2: 95% (2020 07:33) (95% - 100%)    LABS:                        10.6   6.47  )-----------( 196      ( 2020 06:51 )             32.1         139  |  105  |  20  ----------------------------<  134<H>  4.0   |  22  |  0.7    Ca    9.7      2020 06:51  Mg     1.9     01-15    TPro  7.4  /  Alb  3.6  /  TBili  0.8  /  DBili  x   /  AST  112<H>  /  ALT  59<H>  /  AlkPhos  126<H>      PT/INR - ( 15 Nelson 2020 13:25 )   PT: 11.80 sec;   INR: 1.03 ratio         PTT - ( 15 Nelson 2020 13:25 )  PTT:33.5 sec  Urinalysis Basic - ( 15 Nelson 2020 14:50 )    Color: Yellow / Appearance: Clear / S.009 / pH: x  Gluc: x / Ketone: Negative  / Bili: Negative / Urobili: <2 mg/dL   Blood: x / Protein: 30 mg/dL / Nitrite: Negative   Leuk Esterase: Negative / RBC: 1 /HPF / WBC 1 /HPF   Sq Epi: x / Non Sq Epi: 1 /HPF / Bacteria: Negative        Troponin T, Serum: <0.01 ng/mL (20 @ 00:08)  Troponin T, Serum: <0.01 ng/mL (01-15-20 @ 20:48)  Troponin T, Serum: <0.01 ng/mL (01-15-20 @ 13:25)  Creatine Kinase, Serum: 65 U/L (01-15-20 @ 13:25)      CARDIAC MARKERS ( 2020 00:08 )  x     / <0.01 ng/mL / x     / x     / 1.0 ng/mL  CARDIAC MARKERS ( 15 Nelson 2020 20:48 )  x     / <0.01 ng/mL / x     / x     / <1.0 ng/mL  CARDIAC MARKERS ( 15 Nelson 2020 13:25 )  x     / <0.01 ng/mL / 65 U/L / x     / <1.0 ng/mL      RADIOLOGY:  < from: VA Duplex Carotid, Bilat (20 @ 10:45) >  20-39% stenosis of the right internal carotid artery.    20-39% stenosis of the left internal carotid artery.    No evidence of carotid artery aneurysm was visualized on the duplex. If clinical suspicion persist CT angiogram of the neck is recommended.    < end of copied text >    < from: VA Duplex Lower Ext Vein Scan, Bilat (20 @ 10:32) >    Impression:    Venous thrombosis of left peroneal vein branch     No evidence of deep venous thrombosis or superficial thrombophlebitis in right lower extremity.    < end of copied text >      PHYSICAL EXAM:  GENERAL: NAD, frail, AAOx3, Bed bound, needs assistance to come out of bed.  HEENT:  Atraumatic, Normocephalic, mild pulsation noted on right side.   PULMONARY: Clear to auscultation bilaterally  CARDIOVASCULAR: Regular rate and rhythm  GASTROINTESTINAL: Soft, Nontender, Nondistended  MUSCULOSKELETAL:  2+ Peripheral Pulses, No edema  NEUROLOGY: non-focal  SKIN: No rashes or lesions      ADMISSION SUMMARY  Patient is a 76y old Female who presents with a chief complaint of Chest pain (2020 06:33)    ASSESSMENT & PLAN    # Atypical chest pain;  -History of CAD s/p MI s/p PCI in 2018. Called Dr. Cedillo office [phone], will get call back.  -EKG showed ST elevation in aVR, and anterolateral TW flattening and inferior ST depressions  -pain palpable on exam, and associated with viral URI symptoms  -rule out underlying sepsis/infection, obtain blood cultures  -negative serial cardiac troponins  -obtain records of prior PCI from her Cardiologist   -c/w ASA 81mg, statin, beta-blocker, ARB  -palpable, pulsatile neck mass in the right neck, would recommend vascular duplex, or CT neck to rule out AVF, pseudoaneurysm, or hematoma    EKG shows T wave inversion in anterolateral leads, ST elevation in aVR  STEMI code called in ED, was cancelled by Cardio team  Troponin 1st set negative, f/u x 2  F/u cardiology  F/u official ECHO (bedside echo unremarkable)  C/w Statin, Aspirin, Atenolol    # Fever:  With associated congestion, could be Upper respiratory tract infection, supportive care  Flu -ve, CXR clear  UA clear    # DM:  Monitor FS and insulin if >180    #Pancreatic cancer:  C/w Creon  Will f/u with oncology as outpatient    #Diet:  DASH/ Carb consistent    # Activity:  As tolerated    # DVT ppx:  lovenox TUEY CHO 76y Female  MRN#: [id number]       SUBJECTIVE  76 year old female with PMH of CAD s/p stents in 2018, DM, Pancreatic cancer was brought in by family for chest pain. As per the son, she is feeling better today, the chest pain was radiating to wards the back. Now she is feeling comfortable.     OBJECTIVE  PAST MEDICAL & SURGICAL HISTORY  MI (myocardial infarction)  Diabetes  Hypertension  Pancreatic cancer  History of pancreatic surgery    ALLERGIES:  Allergy Status Unknown    MEDICATIONS:  STANDING MEDICATIONS  aspirin enteric coated 81 milliGRAM(s) Oral daily  ATENolol  Tablet 50 milliGRAM(s) Oral daily  atorvastatin 20 milliGRAM(s) Oral at bedtime  dextrose 5%. 1000 milliLiter(s) IV Continuous <Continuous>  dextrose 50% Injectable 12.5 Gram(s) IV Push once  dextrose 50% Injectable 25 Gram(s) IV Push once  dextrose 50% Injectable 25 Gram(s) IV Push once  enoxaparin Injectable 40 milliGRAM(s) SubCutaneous daily  insulin glargine Injectable (LANTUS) 9 Unit(s) SubCutaneous at bedtime  insulin lispro (HumaLOG) corrective regimen sliding scale   SubCutaneous three times a day before meals  insulin lispro Injectable (HumaLOG) 6 Unit(s) SubCutaneous three times a day before meals  losartan 100 milliGRAM(s) Oral daily  pancrelipase  (CREON 12,000 Lipase Units) 2 Capsule(s) Oral three times a day with meals  pantoprazole    Tablet 40 milliGRAM(s) Oral before breakfast  ranolazine 500 milliGRAM(s) Oral two times a day    PRN MEDICATIONS  dextrose 40% Gel 15 Gram(s) Oral once PRN  glucagon  Injectable 1 milliGRAM(s) IntraMuscular once PRN      VITAL SIGNS: Last 24 Hours  T(C): 37.1 (2020 07:33), Max: 38.6 (2020 06:52)  T(F): 98.7 (2020 07:33), Max: 101.5 (2020 06:52)  HR: 84 (2020 07:33) (70 - 89)  BP: 146/78 (2020 07:33) (134/76 - 153/92)  BP(mean): --  RR: 18 (2020 07:33) (16 - 18)  SpO2: 95% (2020 07:33) (95% - 100%)    LABS:                        10.6   6.47  )-----------( 196      ( 2020 06:51 )             32.1         139  |  105  |  20  ----------------------------<  134<H>  4.0   |  22  |  0.7    Ca    9.7      2020 06:51  Mg     1.9     01-15    TPro  7.4  /  Alb  3.6  /  TBili  0.8  /  DBili  x   /  AST  112<H>  /  ALT  59<H>  /  AlkPhos  126<H>      PT/INR - ( 15 Nelson 2020 13:25 )   PT: 11.80 sec;   INR: 1.03 ratio         PTT - ( 15 Nelson 2020 13:25 )  PTT:33.5 sec  Urinalysis Basic - ( 15 Nelson 2020 14:50 )    Color: Yellow / Appearance: Clear / S.009 / pH: x  Gluc: x / Ketone: Negative  / Bili: Negative / Urobili: <2 mg/dL   Blood: x / Protein: 30 mg/dL / Nitrite: Negative   Leuk Esterase: Negative / RBC: 1 /HPF / WBC 1 /HPF   Sq Epi: x / Non Sq Epi: 1 /HPF / Bacteria: Negative        Troponin T, Serum: <0.01 ng/mL (20 @ 00:08)  Troponin T, Serum: <0.01 ng/mL (01-15-20 @ 20:48)  Troponin T, Serum: <0.01 ng/mL (01-15-20 @ 13:25)  Creatine Kinase, Serum: 65 U/L (01-15-20 @ 13:25)      CARDIAC MARKERS ( 2020 00:08 )  x     / <0.01 ng/mL / x     / x     / 1.0 ng/mL  CARDIAC MARKERS ( 15 Nelson 2020 20:48 )  x     / <0.01 ng/mL / x     / x     / <1.0 ng/mL  CARDIAC MARKERS ( 15 Nelson 2020 13:25 )  x     / <0.01 ng/mL / 65 U/L / x     / <1.0 ng/mL      RADIOLOGY:  < from: VA Duplex Carotid, Bilat (20 @ 10:45) >  20-39% stenosis of the right internal carotid artery.    20-39% stenosis of the left internal carotid artery.    No evidence of carotid artery aneurysm was visualized on the duplex. If clinical suspicion persist CT angiogram of the neck is recommended.    < end of copied text >    < from: VA Duplex Lower Ext Vein Scan, Bilat (20 @ 10:32) >    Impression:    Venous thrombosis of left peroneal vein branch     No evidence of deep venous thrombosis or superficial thrombophlebitis in right lower extremity.    < end of copied text >      PHYSICAL EXAM:  GENERAL: NAD, frail, AAOx3, Bed bound, needs assistance to come out of bed.  HEENT:  Atraumatic, Normocephalic, mild pulsation noted on right side.   PULMONARY: Clear to auscultation bilaterally  CARDIOVASCULAR: Regular rate and rhythm  GASTROINTESTINAL: Soft, Nontender, Nondistended  MUSCULOSKELETAL:  2+ Peripheral Pulses, No edema  NEUROLOGY: non-focal  SKIN: No rashes or lesions      ADMISSION SUMMARY  Patient is a 76y old Female who presents with a chief complaint of Chest pain (2020 06:33)    ASSESSMENT & PLAN    # Atypical chest pain  -History of CAD s/p MI s/p PCI in 2018. Called Dr. Cedillo office [phone] Twave changes in the anterior leads are old.   -negative serial cardiac troponins  -c/w ASA 81mg, statin, beta-blocker, ARB  -CT scan chest dissection protocol done to rule out dissection.   -VA doppler neck- negative for aneurysm. VA lower extremities is negative for DVT but positive for superficial vein thrombosis in left peroneal vein branch.   -Pending echo.    # Fever  -UA negative, Chest Xray looks clear. Blood cultures were collected.   -Monitor off antibiotics for now.     # DM:  -Monitor FS and insulin if >180    #Pancreatic cancer:  -C/w Creon  -Will f/u with oncology as outpatient    # DVT prophylaxis  -On Lovenox No

## 2024-04-22 NOTE — PHYSICAL THERAPY INITIAL EVALUATION ADULT - ORIENTATION, REHAB EVAL
The patient's lipid profile is unremarkable.  I will continue atorvastatin at 40 mg p.o. daily.   oriented to person, place, time and situation

## 2024-04-22 NOTE — DISCHARGE NOTE PROVIDER - HOSPITAL COURSE
The patient underwent a LEFT TOTAL KNEE REPLACEMENT on 4/22. The patient received antibiotics consistent with SCIP guidelines. The patient underwent the procedure and had no intra-operative complications. Post-operatively, the patient was seen by medicine and PT. The patient received ASPIRIN for DVTP. The patient received pain medications per orthopedic pain management pathway and the pain was appropriately controlled. The patient did not have any post-operative medical complications. The patient was discharged in stable condition.

## 2024-04-22 NOTE — CONSULT NOTE ADULT - ASSESSMENT
67 y/o female with Hx of HTN, UC, GERD, s/p right knee replacement, S/P b/l knee arthroscopy, s/p breast cyst excision, s/p ovarian cystectomy, and obesity, patient has left knee pain for a few years, Patient tried gel infections last injection finished 2/2024 with no alleviation of symptoms. CT 3/22/24 Left Knee "Impression: Degenerative changes of the left knee." came in here for elective Left Total Knee Replacement with Julio on 4/22/2024 s/p procedure.     Plan:         - post op no complications  - VS stable  - abx per ortho   - c/w anti hypertensive to be restarted post op day 02 except if blood pressure goes >150 systolic   - c/w IVF x 24 hrs then reassess per ortho  - opiate induced constipation regimen   - encouraging incentive spirometry   -c/w local wound care per ortho   -DVT prophylaxis and Pain meds as per Ortho team   -PT/OT and weight bearing per ortho       · hydrochlorothiazide 12.5 mg oral capsule: Last Dose Taken:  , 1 cap(s) orally once a day (at bedtime)  · 	omeprazole 40 mg oral delayed release capsule: Last Dose Taken:  , 1 cap(s) orally once a day in am  · 	mesalamine 1.2 g oral delayed release tablet: 2 tab(s) orally once a day  · 	losartan 50 mg oral tablet: Last Dose Taken:  , 1 tab(s) orally once a day in am  · 	zoledronic acid 4 mg/100 mL intravenous solution: 4 milligram(s) intravenously yearly  · 	pregabalin 50 mg oral capsule: 1 cap(s) orally 2 times a day  · 	semaglutide 0.5 mg/0.5 mL (0.5 mg dose) subcutaneous solution: 0.5 milligram(s) subcutaneously Mondays counseled to take last dose 4/8/24 prior to surgery 4/22/24

## 2024-04-22 NOTE — PATIENT PROFILE ADULT - FALL HARM RISK - TYPE OF ASSESSMENT
Visit Information Date & Time Provider Department Dept. Phone Encounter #  
 3/20/2017  2:40 PM Maximus Hanley, [address] [phone] [id number] Follow-up Instructions Routing History Your Appointments 5/23/2017  9:00 AM  
ESTABLISHED PATIENT with Rolf Andersen MD  
[facility] ([address]) Appt Note: 3 mo f/u w/ fbw $ 0 cp [address] [place]  [phone]  
[phone]  
  
   
 [address] [id number] Upcoming Health Maintenance Date Due DTaP/Tdap/Td series (1 - Tdap) 2/10/1956 ZOSTER VACCINE AGE 60> 2/10/1996 MEDICARE YEARLY EXAM 3/1/2017 GLAUCOMA SCREENING Q2Y 10/22/2017 Allergies as of 3/20/2017  Review Complete On: 3/20/2017 By: Maximus Hanley MD  
  
 Severity Noted Reaction Type Reactions Niacin  10/22/2013    Itching Felt hot also Ultram [Tramadol]  10/22/2013    Nausea Only Current Immunizations  Reviewed on 11/5/2015 Name Date Influenza High Dose Vaccine PF 10/4/2016 Influenza Vaccine 10/22/2014 Influenza Vaccine (Quad) PF 11/5/2015 Pneumococcal Conjugate (PCV-13) 5/1/2015 Not reviewed this visit You Were Diagnosed With   
  
 Codes Comments Colonic mass    -  Primary ICD-10-CM: K63.9 ICD-9-CM: 569.89 Vitals BP Pulse Temp Resp Height(growth percentile) Weight(growth percentile) 120/78 (BP 1 Location: Right arm, BP Patient Position: Sitting) 76 98.3 °F (36.8 °C) (Oral) 16 5' 2\" (1.575 m) 248 lb (112.5 kg) SpO2 BMI OB Status Smoking Status 95% 45.36 kg/m2 Hysterectomy Never Smoker BMI and BSA Data Body Mass Index Body Surface Area  
 45.36 kg/m 2 2.22 m 2 Preferred Pharmacy Pharmacy Name Phone [facility],[address] Floor, [address] [place] [address]  [phone] Your Updated Medication List  
  
   
 This list is accurate as of: 3/20/17  5:45 PM.  Always use your most recent med list.  
  
  
  
  
 acetaminophen 325 mg tablet Commonly known as:  TYLENOL Take  by mouth every four (4) hours as needed for Pain. ASPERCREME EX  
by Apply Externally route. aspirin delayed-release 81 mg tablet Take  by mouth daily. atorvastatin 20 mg tablet Commonly known as:  LIPITOR Take 1 Tab by mouth daily. clotrimazole 1 % topical cream  
Commonly known as:  Mark Fend Apply  to affected area two (2) times a day. CO Q-10 PO Take 200 mg by mouth. dilTIAZem 120 mg SR capsule Commonly known as:  [facility] Take 1 Cap by mouth daily. esomeprazole 40 mg capsule Commonly known as:  NexIUM Take 1 Cap by mouth daily. fexofenadine 180 mg tablet Commonly known as:  ALLEGRA ALLERGY Take 1 Tab by mouth daily. hydroCHLOROthiazide 25 mg tablet Commonly known as:  HYDRODIURIL Take 1 Tab by mouth daily. lisinopril 10 mg tablet Commonly known as:  Sunil Bridges Take 1 Tab by mouth daily. magnesium oxide 500 mg Tab Take  by mouth.  
  
 meloxicam 15 mg tablet Commonly known as:  MOBIC Take 1 Tab by mouth daily. multivitamin tablet Commonly known as:  ONE A DAY Take 1 Tab by mouth daily. phenylephrine 0.25 % suppository Insert 1 Suppository into rectum two (2) times a day. potassium chloride SR 10 mEq tablet Commonly known as:  KLOR-CON 10 Take 2 Tabs by mouth two (2) times a day. To-Do List   
 04/04/2017 2:00 PM  
  Appointment with [facility] PAT EXAM RM 6 at [place] [address] ([phone]) Introducing hospitals & HEALTH SERVICES! Dear Aarti Bray: 
Thank you for requesting a [employer] account. Our records indicate that you already have an active [employer] account. You can access your account anytime at [email or web address] Did you know that you can access your hospital and ER discharge instructions at any time in [employer]? You can also review all of your test results from your hospital stay or ER visit. Additional Information If you have questions, please visit the Frequently Asked Questions section of the [employer] website at [email or web address] Remember, [employer] is NOT to be used for urgent needs. For medical emergencies, dial 911. Now available from your iPhone and Android! Please provide this summary of care documentation to your next provider. Your primary care clinician is listed as Dimple Greco. If you have any questions after today's visit, please call [phone]. Admission

## 2024-04-22 NOTE — DISCHARGE NOTE PROVIDER - NSDCFUSCHEDAPPT_GEN_ALL_CORE_FT
Annmarie Mcmanus  [facility]  GASTRO RAZA [address]  Scheduled Appointment: 05/03/2024    Annmarie Mcmanus  [facility] [address]  Scheduled Appointment: 05/03/2024    [facility]  ORTHOSURG [address]  Scheduled Appointment: 05/13/2024    Torsten Guzman  [facility]  ORTHOSURG [address]  Scheduled Appointment: 06/03/2024    Kleiner, Myron I  [facility] [address]  Scheduled Appointment: 07/09/2024    Torsten Guzman  [facility]  ORTHOSURG [address]  Scheduled Appointment: 07/15/2024     PAST MEDICAL HISTORY:  DM (diabetes mellitus)     HLD (hyperlipidemia)     HTN (hypertension)     PAD (peripheral artery disease)

## 2024-04-22 NOTE — CONSULT NOTE ADULT - SUBJECTIVE AND OBJECTIVE BOX
67 y/o female with Hx of HTN, UC, GERD, s/p right knee replacement, S/P b/l knee arthroscopy, s/p breast cyst excision, s/p ovarian cystectomy, and obesity, patient has left knee pain for a few years, Patient tried gel infections last injection finished 2/2024 with no alleviation of symptoms. CT 3/22/24 Left Knee "Impression: Degenerative changes of the left knee." came in here for elective Left Total Knee Replacement with Julio on 4/22/2024 s/p procedure        Allergies:  	No Known Allergies:        Intolerances:  	gabapentin: Drug, Other, bleeding      PAST MEDICAL HISTORY:  GERD (gastroesophageal reflux disease)     HTN (hypertension)     Lumbar herniated disc     Obesity (BMI 30-39.9)     Ulcerative colitis     Unilateral primary osteoarthritis, left knee     Unilateral primary osteoarthritis, right knee.     PAST SURGICAL HISTORY:  Breast cyst, left excision    History of arthroscopy of both knees (Right 2013, left 04/2016)    History of ovarian cystectomy.     FAMILY HISTORY:  Father  Still living? Unknown  FH: HTN (hypertension), Age at diagnosis: Age Unknown    Mother  Still living? Unknown  FH: kidney cancer, Age at diagnosis: Age Unknown    Sibling  Still living? Unknown  Family history of cardiac arrest, Age at diagnosis: 31-40    Grandparent  Still living? Unknown  FH: stroke, Age at diagnosis: Age Unknown.      Social History:   Not a smoker, drinker or using any drugs     Home Medications:   * Patient Currently Takes Medications as of 03-Apr-2024 12:06 documented in Structured Notes  · 	hydroCHLOROthiazide 12.5 mg oral capsule: Last Dose Taken:  , 1 cap(s) orally once a day (at bedtime)  · 	omeprazole 40 mg oral delayed release capsule: Last Dose Taken:  , 1 cap(s) orally once a day in am  · 	mesalamine 1.2 g oral delayed release tablet: 2 tab(s) orally once a day  · 	losartan 50 mg oral tablet: Last Dose Taken:  , 1 tab(s) orally once a day in am  · 	zoledronic acid 4 mg/100 mL intravenous solution: 4 milligram(s) intravenously yearly  · 	pregabalin 50 mg oral capsule: 1 cap(s) orally 2 times a day  · 	semaglutide 0.5 mg/0.5 mL (0.5 mg dose) subcutaneous solution: 0.5 milligram(s) subcutaneously Mondays counseled to take last dose 4/8/24 prior to surgery 4/22/24

## 2024-04-22 NOTE — DISCHARGE NOTE PROVIDER - NSDCFUADDINST_GEN_ALL_CORE_FT
The patient will be seen in the office between 2-3 weeks for wound check.   **Your first post-operative visit has been scheduled prior to your admission. PLEASE CONTACT OFFICE TO CONFIRM THE APPOINTMENT DATE.  **  The silver based dressing is to be removed 7 days from the date of surgery (4/29).   ** CONTACT THE OFFICE IF THE FOLLOWING DEVELOP:  - the dressing becomes soiled or saturated  - you develop a fever greater that 101F  - the wound becomes red or you develop blistering around the wound  * Patient may shower after post-op day #3.   * The patient will continue home PT consistent with  total knee replacement protocol. Transition to outpatient PT will occur at the time of the first office visit.   * The patient will practice knee extension exercises regularly to minimize hamstring contraction.   * The patient is FULL weight bearing.  * The patient will continue ASPIRIN for 6 weeks after surgery for blood clot prevention.  * While on aspirin, the patient will take daily omeprazole or other similar medication to protect the stomach from irritation.   * The patient will take OXYCODONE  for pain control and adjust according to prescription and patient needs. Patient will continue Tylenol 975mg every 8 hours as needed for pain. Contact the office if pain increases while taking prescribed pain medications or related concerns develop.  * Celebrex will be taken twice daily for 3 weeks for pain control and prevention of excessive bone growth. Additional prescription may be requested at your office follow-up visit.   * The patient will take Senna S while taking oxycodone to prevent narcotic associated constipation.  Additionally, increase water intake (drink at least 8 glasses of water daily) and try adding fiber to the diet by eating fruits, vegetables and foods that are rich in grains. If constipation is experienced, contact the medical/primary care provider to discuss further treatment options.  * To avoid injury at home:  - continue use of rolling walker until cleared by physical therapist  - have family or friend remove all throw rug or objects in hallways that may present a trip hazard.  - if you experience any dizziness or medical concerns, call your medical doctor or  911.  * The implant may activate metal detection devices.  The patient will be seen in the office between 2-3 weeks for wound check.   **Your first post-operative visit has been scheduled prior to your admission. PLEASE CONTACT OFFICE TO CONFIRM THE APPOINTMENT DATE.  **  The silver based dressing is to be removed 7 days from the date of surgery (4/29/24).   ** CONTACT THE OFFICE IF THE FOLLOWING DEVELOP:  - the dressing becomes soiled or saturated  - you develop a fever greater that 101F  - the wound becomes red or you develop blistering around the wound  * Patient may shower after post-op day #3.   * The patient will continue home PT consistent with  total knee replacement protocol. Transition to outpatient PT will occur at the time of the first office visit.   * The patient will practice knee extension exercises regularly to minimize hamstring contraction.   * The patient is FULL weight bearing.  * The patient will continue ASPIRIN 81mg twice daily for 6 weeks after surgery for blood clot prevention.  * While on aspirin, the patient will take daily omeprazole or other similar medication to protect the stomach from irritation.   * The patient will take OXYCODONE  for pain control and adjust according to prescription and patient needs. Patient will continue Tylenol 975mg every 8 hours as needed for pain. Contact the office if pain increases while taking prescribed pain medications or related concerns develop.  * Celebrex will be taken twice daily for 3 weeks for pain control and prevention of excessive bone growth. Additional prescription may be requested at your office follow-up visit.   * The patient will take Senna S while taking oxycodone to prevent narcotic associated constipation.  Additionally, increase water intake (drink at least 8 glasses of water daily) and try adding fiber to the diet by eating fruits, vegetables and foods that are rich in grains. If constipation is experienced, contact the medical/primary care provider to discuss further treatment options.  * To avoid injury at home:  - continue use of rolling walker until cleared by physical therapist  - have family or friend remove all throw rug or objects in hallways that may present a trip hazard.  - if you experience any dizziness or medical concerns, call your medical doctor or  911.  * The implant may activate metal detection devices.

## 2024-04-22 NOTE — PATIENT PROFILE ADULT - FALL HARM RISK - RISK INTERVENTIONS
Assistance OOB with selected safe patient handling equipment/Assistance with ambulation/Communicate Fall Risk and Risk Factors to all staff, patient, and family/Discuss with provider need for PT consult/Monitor gait and stability/Provide patient with walking aids - walker, cane, crutches/Reinforce activity limits and safety measures with patient and family/Sit up slowly, dangle for a short time, stand at bedside before walking/Use of alarms - bed, chair and/or voice tab/Visual Cue: Yellow wristband/Bed in lowest position, wheels locked, appropriate side rails in place/Call bell, personal items and telephone in reach/Instruct patient to call for assistance before getting out of bed or chair/Non-slip footwear when patient is out of bed/[place] to call system/Physically safe environment - no spills, clutter or unnecessary equipment/Purposeful Proactive Rounding/Room/bathroom lighting operational, light cord in reach

## 2024-04-22 NOTE — DISCHARGE NOTE NURSING/CASE MANAGEMENT/SOCIAL WORK - PATIENT PORTAL LINK FT
You can access the FollowMyHealth Patient Portal offered by [facility] by registering at the following website: [email or web address]. By joining [facility]’s FollowMyHealth portal, you will also be able to view your health information using other applications (apps) compatible with our system.

## 2024-04-22 NOTE — PHYSICAL THERAPY INITIAL EVALUATION ADULT - RANGE OF MOTION EXAMINATION, REHAB EVAL
except left knee 0 to 85 deg/bilateral upper extremity ROM was WFL (within functional limits)/bilateral lower extremity ROM was WFL (within functional limits)

## 2024-04-22 NOTE — PATIENT PROFILE ADULT - FALL HARM RISK - FACTORS
Monitor: The patient's morbid obesity is stable.  Evaluation: No diagnostic tests required today.  Assessment/Treatment:  Discussed the patient's BMI.  The BMI is above average. The patient received dietary education because they have an above normal BMI..  General weight loss/lifestyle modification strategies discussed (elicit support from others; identify saboteurs; non-food rewards, etc).  Condition will be reassessed At the next visit.  
Hypertension is stable.  Continue current treatment regimen.  Blood pressure will be reassessed at the next regular appointment.  
Lamictal can be responsible for palpitations less than 1% of the time.  I advised the patient to discuss further with his psychiatrist.  
Psychological condition is stable.  Per psychiatry  Psychological condition will be reassessed at the next regular appointment.  
The patient has symptoms and risk factors for sleep apnea.  He is aware that untreated this can contribute to cardiac conditions.  He is agreeable to seeing the sleep service for further evaluation.  
This is likely due to PACs.  I will increase metoprolol to 25 mg twice daily.  The patient will keep a blood pressure log at home to make sure he is not becoming hypotensive.  We also discussed the patient purchasing a cardia mobile device to better correlate symptoms versus cardiac rhythm.  The patient is open to this idea.  I will also refer the patient to sleep service for possible sleep apnea.  
Post Op

## 2024-04-22 NOTE — PHYSICAL THERAPY INITIAL EVALUATION ADULT - ADDITIONAL COMMENTS
Pt reports living with son (works during day) in a house with 1-2 [place] c no rail, and 13 steps c rail to bedroom. Pt amb without DME and is independent with functional mobility, ADLs, and IADLs. Pt drives and is retired. Pt has support of family. Pt owns RW, [employer].

## 2024-04-23 VITALS
OXYGEN SATURATION: 92 % | DIASTOLIC BLOOD PRESSURE: 70 MMHG | TEMPERATURE: 98 F | RESPIRATION RATE: 18 BRPM | HEART RATE: 64 BPM | SYSTOLIC BLOOD PRESSURE: 109 MMHG

## 2024-04-23 LAB
ANION GAP SERPL CALC-SCNC: 7 MMOL/L — SIGNIFICANT CHANGE UP (ref 5–17)
BUN SERPL-MCNC: 23.2 MG/DL — HIGH (ref 8–20)
CALCIUM SERPL-MCNC: 7.9 MG/DL — LOW (ref 8.4–10.5)
CHLORIDE SERPL-SCNC: 106 MMOL/L — SIGNIFICANT CHANGE UP (ref 96–108)
CO2 SERPL-SCNC: 24 MMOL/L — SIGNIFICANT CHANGE UP (ref 22–29)
CREAT SERPL-MCNC: 0.44 MG/DL — LOW (ref 0.5–1.3)
EGFR: 106 ML/MIN/1.73M2 — SIGNIFICANT CHANGE UP
GLUCOSE SERPL-MCNC: 128 MG/DL — HIGH (ref 70–99)
HCT VFR BLD CALC: 33.6 % — LOW (ref 34.5–45)
HGB BLD-MCNC: 11.3 G/DL — LOW (ref 11.5–15.5)
MCHC RBC-ENTMCNC: 28.7 PG — SIGNIFICANT CHANGE UP (ref 27–34)
MCHC RBC-ENTMCNC: 33.6 GM/DL — SIGNIFICANT CHANGE UP (ref 32–36)
MCV RBC AUTO: 85.3 FL — SIGNIFICANT CHANGE UP (ref 80–100)
PLATELET # BLD AUTO: 233 K/UL — SIGNIFICANT CHANGE UP (ref 150–400)
POTASSIUM SERPL-MCNC: 3.7 MMOL/L — SIGNIFICANT CHANGE UP (ref 3.5–5.3)
POTASSIUM SERPL-SCNC: 3.7 MMOL/L — SIGNIFICANT CHANGE UP (ref 3.5–5.3)
RBC # BLD: 3.94 M/UL — SIGNIFICANT CHANGE UP (ref 3.8–5.2)
RBC # FLD: 13.7 % — SIGNIFICANT CHANGE UP (ref 10.3–14.5)
SODIUM SERPL-SCNC: 137 MMOL/L — SIGNIFICANT CHANGE UP (ref 135–145)
WBC # BLD: 10.03 K/UL — SIGNIFICANT CHANGE UP (ref 3.8–10.5)
WBC # FLD AUTO: 10.03 K/UL — SIGNIFICANT CHANGE UP (ref 3.8–10.5)

## 2024-04-23 PROCEDURE — 99232 SBSQ HOSP IP/OBS MODERATE 35: CPT

## 2024-04-23 PROCEDURE — S2900: CPT

## 2024-04-23 PROCEDURE — 36415 COLL VENOUS BLD VENIPUNCTURE: CPT

## 2024-04-23 PROCEDURE — 85027 COMPLETE CBC AUTOMATED: CPT

## 2024-04-23 PROCEDURE — C1776: CPT

## 2024-04-23 PROCEDURE — 73560 X-RAY EXAM OF KNEE 1 OR 2: CPT

## 2024-04-23 PROCEDURE — C1713: CPT

## 2024-04-23 PROCEDURE — 80048 BASIC METABOLIC PNL TOTAL CA: CPT

## 2024-04-23 RX ORDER — OMEPRAZOLE 10 MG/1
1 CAPSULE, DELAYED RELEASE ORAL
Qty: 0 | Refills: 0 | DISCHARGE

## 2024-04-23 RX ORDER — OXYCODONE HYDROCHLORIDE 5 MG/1
1 TABLET ORAL
Qty: 28 | Refills: 0
Start: 2024-04-23 | End: 2024-04-29

## 2024-04-23 RX ORDER — CELECOXIB 200 MG/1
1 CAPSULE ORAL
Qty: 42 | Refills: 0
Start: 2024-04-23 | End: 2024-05-13

## 2024-04-23 RX ORDER — NALOXONE HYDROCHLORIDE 4 MG/.1ML
4 SPRAY NASAL
Qty: 1 | Refills: 0
Start: 2024-04-23

## 2024-04-23 RX ORDER — ASPIRIN/CALCIUM CARB/MAGNESIUM 324 MG
1 TABLET ORAL
Qty: 84 | Refills: 0
Start: 2024-04-23 | End: 2024-06-03

## 2024-04-23 RX ORDER — SENNOSIDES/DOCUSATE SODIUM 8.6MG-50MG
2 TABLET ORAL
Qty: 14 | Refills: 0
Start: 2024-04-23 | End: 2024-04-29

## 2024-04-23 RX ORDER — PANTOPRAZOLE SODIUM 20 MG/1
1 TABLET, DELAYED RELEASE ORAL
Qty: 42 | Refills: 0
Start: 2024-04-23 | End: 2024-06-03

## 2024-04-23 RX ADMIN — Medication 1000 MILLIGRAM(S): at 06:57

## 2024-04-23 RX ADMIN — Medication 15 MILLIGRAM(S): at 05:56

## 2024-04-23 RX ADMIN — Medication 15 MILLIGRAM(S): at 00:39

## 2024-04-23 RX ADMIN — Medication 15 MILLIGRAM(S): at 06:57

## 2024-04-23 RX ADMIN — Medication 400 MILLIGRAM(S): at 05:57

## 2024-04-23 RX ADMIN — Medication 50 MILLIGRAM(S): at 05:56

## 2024-04-23 RX ADMIN — OXYCODONE HYDROCHLORIDE 5 MILLIGRAM(S): 5 TABLET ORAL at 05:56

## 2024-04-23 RX ADMIN — PANTOPRAZOLE SODIUM 40 MILLIGRAM(S): 20 TABLET, DELAYED RELEASE ORAL at 05:56

## 2024-04-23 RX ADMIN — OXYCODONE HYDROCHLORIDE 5 MILLIGRAM(S): 5 TABLET ORAL at 06:56

## 2024-04-23 RX ADMIN — Medication 81 MILLIGRAM(S): at 05:56

## 2024-04-23 NOTE — PROGRESS NOTE ADULT - ASSESSMENT
69 y/o female with Hx of HTN, UC, GERD, s/p right knee replacement, S/P b/l knee arthroscopy, s/p breast cyst excision, s/p ovarian cystectomy, and obesity, patient has left knee pain for a few years, Patient tried gel infections last injection finished 2/2024 with no alleviation of symptoms. CT 3/22/24 Left Knee "Impression: Degenerative changes of the left knee." came in here for elective Left Total Knee Replacement with Julio on 4/22/2024 s/p procedure.     Plan:     - post op no complications  - VS stable  - abx per ortho completed  - c/w anti hypertensive to be restarted post op day 02 except if blood pressure goes >150 systolic   - c/w IVF  until taking po well.  - opiate induced constipation regimen   - encouraging incentive spirometry   -c/w local wound care per ortho   -DVT prophylaxis and Pain meds as per Ortho team   -PT/OT and weight bearing per ortho     Obesity  Dietary/Lifestyle modifications    Dispo: No medical contraindications to DC when cleared by Ortho and PT

## 2024-04-23 NOTE — PROGRESS NOTE ADULT - SUBJECTIVE AND OBJECTIVE BOX
ISHA SADAF  [id number]      Postop check: s/p left TKA POD#0    History: Patient seen and eval at bedside. Patient is doing well and is comfortable. The patient's pain is controlled using the prescribed pain medications. The patient is participating in physical therapy. Denies nausea, vomiting, chest pain, shortness of breath, abdominal pain or fever. No new complaints.    T(C): 36.3 (04-22-24 @ 12:58), Max: 36.9 (04-22-24 @ 06:24)  HR: 55 (04-22-24 @ 12:58) (55 - 72)  BP: 124/75 (04-22-24 @ 12:58) (102/61 - 134/66)  RR: 18 (04-22-24 @ 12:58) (9 - 18)  SpO2: 95% (04-22-24 @ 12:58) (92% - 97%)      PE: NAD, alert, awake  Left LE:   Knee dressing C/D/I, no drainage, no bleeding  EHL/TA/FHL/GS intact, DP pulse 2+  Gross sensation to LT intact distally s/s/DP/SP/tib distrib, Calf soft, NT B/L    Primary Orthopedic Assessment:  •s/p LEFT total knee replacement POD#0    Plan:   ·	DVT prophylaxis as prescribed - ASA, including use of compression devices and ankle pumps  ·	Continue physical therapy: Weightbearing as tolerated of the left lower extremity with assistance of a walker  ·	Incentive spirometry encouraged  ·	Pain control as clinically indicated  ·	periop abx - ancef  ·	Med following  ·	Discharge planning: home tomorrow pending PT and med clearance
ISHA TALAVERA  [id number]    History: 68y Female is status post left total knee arthroplasty on POD # 1. Patient is doing well and is comfortable. The patient's pain is controlled using the prescribed pain medications. The patient is participating in physical therapy. Denies CP, SOB, dizziness, HA, fever/chills, numbness or tingling. No new complaints.    Vital Signs Last 24 Hrs  T(C): 36.7 (23 Apr 2024 05:25), Max: 36.9 (22 Apr 2024 19:55)  T(F): 98 (23 Apr 2024 05:25), Max: 98.4 (22 Apr 2024 19:55)  HR: 65 (23 Apr 2024 05:25) (55 - 68)  BP: 103/60 (23 Apr 2024 05:25) (102/61 - 125/66)  BP(mean): 77 (22 Apr 2024 12:00) (73 - 88)  RR: 18 (23 Apr 2024 05:25) (9 - 18)  SpO2: 96% (23 Apr 2024 05:25) (92% - 97%)    Parameters below as of 23 Apr 2024 05:25  Patient On (Oxygen Delivery Method): room air                              11.3   10.03 )-----------( 233      ( 23 Apr 2024 05:28 )             33.6     04-23    137  |  106  |  23.2<H>  ----------------------------<  128<H>  3.7   |  24.0  |  0.44<L>    Ca    7.9<L>      23 Apr 2024 05:28        General: Alert, awake, NAD  Physical exam: The left knee dressing is clean, dry and intact. No drainage, discharge, erythema, blistering or ecchymosis noted.   The calf is supple nontender b/l.   SILT. +AT/GC/EHL/FHL.   2+ DP pulse. BCR. No cyanosis.    Plan:   - DVT prophylaxis as prescribed, including use of compression devices and ankle pumps  - Continue physical therapy  - Weight bearing status of surgical extremity: WBAT  - Incentive spirometry encouraged  - Pain control as clinically indicated  - Discharge planning – anticipated discharge is Home likely today when cleared by PT and medicine    
CC: LEFT total knee arthroplasty     INTERVAL HPI/OVERNIGHT EVENTS: PAtient seen and examined. No acute issues overnight. Sitting up in chair. Pain controlled on current RX. Voided post operatively. Worked with PT. Anticipates going home today.     Vital Signs Last 24 Hrs  T(C): 36.7 (23 Apr 2024 09:27), Max: 36.9 (22 Apr 2024 19:55)  T(F): 98 (23 Apr 2024 09:27), Max: 98.4 (22 Apr 2024 19:55)  HR: 64 (23 Apr 2024 09:27) (55 - 68)  BP: 109/70 (23 Apr 2024 09:27) (102/61 - 125/66)  BP(mean): 77 (22 Apr 2024 12:00) (73 - 88)  RR: 18 (23 Apr 2024 09:27) (9 - 18)  SpO2: 92% (23 Apr 2024 09:27) (92% - 97%)    Parameters below as of 23 Apr 2024 09:27  Patient On (Oxygen Delivery Method): room air    ROS:  Constitutional, Eyes, ENT, Cardiovascular, Respiratory, Gastrointestinal, Genitourinary, Musculoskeletal, Integumentary, Neurological, Psychiatric, Endocrine, Heme/Lymph, and Allergic/Immunologic review of systems are otherwise negative except as noted in the HPI    PHYSICAL EXAM:    General: Well developed; sitting in chair, in no acute distress  Eyes: PERRLA, EOMI; conjunctiva and sclera clear  Head: Normocephalic; atraumatic  ENMT: No nasal discharge; airway clear  Neck: Supple; non tender; no JVD  Respiratory: No wheezes, rales or rhonchi  Cardiovascular: Regular rate and rhythm. S1 and S2 Normal; No murmurs, gallops or rubs  Gastrointestinal: Soft non-tender non-distended; Normal bowel sounds  Extremities: Left knee dressing C/D/I.  No clubbing, cyanosis or edema  Vascular: Peripheral pulses palpable 2+ bilaterally  Neurological: Alert and oriented x4  Skin: Warm and dry. No acute rash  Psychiatric: Cooperative and appropriate    I&O's Detail    22 Apr 2024 07:01  -  23 Apr 2024 07:00  --------------------------------------------------------  IN:    Lactated Ringers: 150 mL    Oral Fluid: 120 mL    Sodium Chloride 0.9% Bolus: 500 mL  Total IN: 770 mL    OUT:    Voided (mL): 1400 mL  Total OUT: 1400 mL    Total NET: -630 mL                                    11.3   10.03 )-----------( 233      ( 23 Apr 2024 05:28 )             33.6     23 Apr 2024 05:28    137    |  106    |  23.2   ----------------------------<  128    3.7     |  24.0   |  0.44     Ca    7.9        23 Apr 2024 05:28        CAPILLARY BLOOD GLUCOSE          Urinalysis Basic - ( 23 Apr 2024 05:28 )    Color: x / Appearance: x / SG: x / pH: x  Gluc: 128 mg/dL / Ketone: x  / Bili: x / Urobili: x   Blood: x / Protein: x / Nitrite: x   Leuk Esterase: x / RBC: x / WBC x   Sq Epi: x / Non Sq Epi: x / Bacteria: x        MEDICATIONS  (STANDING):  acetaminophen     Tablet .. 975 milliGRAM(s) Oral every 8 hours  aspirin enteric coated 81 milliGRAM(s) Oral every 12 hours  influenza  Vaccine (HIGH DOSE) 0.7 milliLiter(s) IntraMuscular once  mesalamine ER Capsule 1000 milliGRAM(s) Oral three times a day  pantoprazole    Tablet 40 milliGRAM(s) Oral before breakfast  polyethylene glycol 3350 17 Gram(s) Oral at bedtime  pregabalin 50 milliGRAM(s) Oral two times a day  senna 2 Tablet(s) Oral at bedtime  sodium chloride 0.9%. 1000 milliLiter(s) (125 mL/Hr) IV Continuous <Continuous>    MEDICATIONS  (PRN):  aluminum hydroxide/magnesium hydroxide/simethicone Suspension 30 milliLiter(s) Oral four times a day PRN Indigestion  HYDROmorphone   Tablet 4 milliGRAM(s) Oral every 3 hours PRN Severe Pain (7 - 10)  magnesium hydroxide Suspension 30 milliLiter(s) Oral daily PRN Constipation  ondansetron Injectable 4 milliGRAM(s) IV Push every 6 hours PRN Nausea and/or Vomiting  oxyCODONE    IR 5 milliGRAM(s) Oral every 3 hours PRN Mild Pain (1 - 3)  oxyCODONE    IR 10 milliGRAM(s) Oral every 3 hours PRN Moderate Pain (4 - 6)      RADIOLOGY & ADDITIONAL TESTS:

## 2024-05-02 ENCOUNTER — NON-APPOINTMENT (OUTPATIENT)
Age: 69
End: 2024-05-02

## 2024-05-03 ENCOUNTER — APPOINTMENT (OUTPATIENT)
Dept: GASTROENTEROLOGY | Facility: GI CENTER | Age: 69
End: 2024-05-03

## 2024-05-05 RX ORDER — ERGOCALCIFEROL 1.25 MG/1
1.25 MG CAPSULE, LIQUID FILLED ORAL
Qty: 12 | Refills: 0 | Status: ACTIVE | COMMUNITY
Start: 1900-01-01 | End: 1900-01-01

## 2024-05-08 ENCOUNTER — TRANSCRIPTION ENCOUNTER (OUTPATIENT)
Age: 69
End: 2024-05-08

## 2024-05-13 ENCOUNTER — APPOINTMENT (OUTPATIENT)
Dept: ORTHOPEDIC SURGERY | Facility: CLINIC | Age: 69
End: 2024-05-13
Payer: MEDICARE

## 2024-05-13 DIAGNOSIS — Z47.1 AFTERCARE FOLLOWING JOINT REPLACEMENT SURGERY: ICD-10-CM

## 2024-05-13 DIAGNOSIS — Z96.652 AFTERCARE FOLLOWING JOINT REPLACEMENT SURGERY: ICD-10-CM

## 2024-05-13 PROCEDURE — 99024 POSTOP FOLLOW-UP VISIT: CPT

## 2024-05-13 PROCEDURE — 73562 X-RAY EXAM OF KNEE 3: CPT | Mod: 26,LT

## 2024-05-13 NOTE — HISTORY OF PRESENT ILLNESS
[de-identified] : Status post left total knee arthroplasty with Julio on 4/22/2024 [de-identified] : Maru is a 68-year-old female that does present today for her first postoperative visit status post left total knee arthroplasty with Julio on 4/22/2024.  She reports intermittent, mild dull aching well-controlled with the use of oral Tylenol.  She did complete her 3-week course of oral Celebrex and has been taking aspirin twice daily for DVT prophylaxis.  She no longer requires use of her narcotic pain medication.  She has transition from home physical therapy to outpatient physical therapy.  She denies fever, chills or other constitutional symptoms.  No complaints of mechanical symptoms or instability. [de-identified] : Examination of the left knee reveals well-healed surgical incision with no evidence of warmth, erythema, discharge, or wound dehiscence.  Mild joint effusion.  Range of motion 0 to 95 degrees of flexion.  No instability with varus or valgus stress testing.  Stable to anterior drawer test. Calf is supple, nontender.  2+ posterior tibialis pulse. Sensation grossly intact left lower extremity.  5 out of 5 plantarflexion and dorsiflexion strength. [de-identified] : 3 views of the left knee taken in the office today reveal no evidence of acute fracture or dislocation.  There is evidence of left total knee arthroplasty with implants in proper alignment.  No evidence of hardware failure or dissidence. [de-identified] : 68-year-old female status post left total knee arthroplasty with Julio on 4/22/2024.  Overall, she is doing very well.  No signs or symptoms of infection were noted. [de-identified] : She will continue with outpatient formalized physical therapy.  She will complete her 6-week course of oral aspirin for DVT prophylaxis.  She will continue Tylenol as needed for discomfort. Follow-up in 3 weeks for reevaluation.  All questions answered.

## 2024-05-27 ENCOUNTER — NON-APPOINTMENT (OUTPATIENT)
Age: 69
End: 2024-05-27

## 2024-06-04 ENCOUNTER — OFFICE (OUTPATIENT)
Dept: URBAN - METROPOLITAN AREA CLINIC 104 | Facility: CLINIC | Age: 69
Setting detail: OPHTHALMOLOGY
End: 2024-06-04
Payer: MEDICARE

## 2024-06-04 DIAGNOSIS — H25.13: ICD-10-CM

## 2024-06-04 DIAGNOSIS — H43.813: ICD-10-CM

## 2024-06-04 DIAGNOSIS — H01.001: ICD-10-CM

## 2024-06-04 DIAGNOSIS — H01.002: ICD-10-CM

## 2024-06-04 DIAGNOSIS — H40.013: ICD-10-CM

## 2024-06-04 DIAGNOSIS — H01.005: ICD-10-CM

## 2024-06-04 DIAGNOSIS — H04.123: ICD-10-CM

## 2024-06-04 DIAGNOSIS — H01.004: ICD-10-CM

## 2024-06-04 PROCEDURE — 92014 COMPRE OPH EXAM EST PT 1/>: CPT | Performed by: OPTOMETRIST

## 2024-06-04 PROCEDURE — 76514 ECHO EXAM OF EYE THICKNESS: CPT | Performed by: OPTOMETRIST

## 2024-06-04 PROCEDURE — 92250 FUNDUS PHOTOGRAPHY W/I&R: CPT | Performed by: OPTOMETRIST

## 2024-06-04 ASSESSMENT — LID EXAM ASSESSMENTS
OS_BLEPHARITIS: LLL LUL 2+
OD_BLEPHARITIS: RLL RUL 2+

## 2024-06-04 ASSESSMENT — CONFRONTATIONAL VISUAL FIELD TEST (CVF)
OS_FINDINGS: FULL
OD_FINDINGS: FULL

## 2024-06-10 ENCOUNTER — APPOINTMENT (OUTPATIENT)
Dept: ORTHOPEDIC SURGERY | Facility: CLINIC | Age: 69
End: 2024-06-10
Payer: MEDICARE

## 2024-06-10 VITALS
HEIGHT: 61 IN | HEART RATE: 72 BPM | SYSTOLIC BLOOD PRESSURE: 124 MMHG | DIASTOLIC BLOOD PRESSURE: 80 MMHG | WEIGHT: 210 LBS | BODY MASS INDEX: 39.65 KG/M2

## 2024-06-10 DIAGNOSIS — Z96.652 PRESENCE OF LEFT ARTIFICIAL KNEE JOINT: ICD-10-CM

## 2024-06-10 PROCEDURE — 99024 POSTOP FOLLOW-UP VISIT: CPT

## 2024-06-10 PROCEDURE — 73562 X-RAY EXAM OF KNEE 3: CPT | Mod: LT

## 2024-06-10 NOTE — HISTORY OF PRESENT ILLNESS
[Doing Well] : is doing well [Excellent Pain Control] : has excellent pain control [No Sign of Infection] : is showing no signs of infection [Chills] : no chills [Constipation] : no constipation [Diarrhea] : no diarrhea [Dysuria] : no dysuria [Fever] : no fever [Nausea] : no nausea [Vomiting] : no vomiting [de-identified] : Status post left total knee arthroplasty with Julio on 4/22/2024. [de-identified] : 68-year-old female presents to the office for 6-week follow-up status post left total knee arthroplasty, date of surgery 4/22/2024.  Overall the patient is doing well is happy with her progress and decision to have surgery.  She states that her pain is well-controlled and is no longer taking anything for pain.  Is now ambulating without the use of assistive device.  Has continued to participate in physical therapy with good result.  She is riding the bike and able to make a full rotation without issue.  She has completed her aspirin as directed for DVT prophylaxis.  Denies any recent injuries falls or trauma, numbness or tingling in the lower extremity, instability, stiffness, incisional issues, erythema drainage or discharge, chest pain, calf tenderness. [de-identified] : Left lower extremity exam: Incision well-healed without erythema drainage or discharge, knee range of motion 0-120, no instability varus valgus stress, negative anterior drawer, 5 out of 5 strength in foot plantar flexion and dorsiflexion, nontender palpation over the calf, no significant swelling noted.   [de-identified] : 3 views of the left knee taken in office today show no acute fracture dislocation there is a left total knee arthroplasty in appropriate alignment without evidence of loosening or hardware compromise. [de-identified] : 68-year-old female presents to the office for 6-week follow-up status post left total knee arthroplasty, date of surgery 4/22/2024.  Overall the patient is doing well and I am happy with her progress.  She should continue to participate in physical therapy and provided a new referral for that today.  She may start to transition to an at home exercise regimen as she feels comfortable.  Continue to take Tylenol as needed for any pain.  She should follow-up in 6 weeks for repeat evaluation and imaging.  All questions addressed, patient verbalized understanding is agreement the plan.

## 2024-07-03 ENCOUNTER — APPOINTMENT (OUTPATIENT)
Dept: GASTROENTEROLOGY | Facility: CLINIC | Age: 69
End: 2024-07-03

## 2024-07-09 ENCOUNTER — APPOINTMENT (OUTPATIENT)
Dept: RHEUMATOLOGY | Facility: CLINIC | Age: 69
End: 2024-07-09
Payer: MEDICARE

## 2024-07-09 VITALS
HEIGHT: 61 IN | OXYGEN SATURATION: 98 % | HEART RATE: 78 BPM | DIASTOLIC BLOOD PRESSURE: 84 MMHG | SYSTOLIC BLOOD PRESSURE: 124 MMHG

## 2024-07-09 DIAGNOSIS — M79.7 FIBROMYALGIA: ICD-10-CM

## 2024-07-09 DIAGNOSIS — M75.21 BICIPITAL TENDINITIS, RIGHT SHOULDER: ICD-10-CM

## 2024-07-09 DIAGNOSIS — M75.22 BICIPITAL TENDINITIS, LEFT SHOULDER: ICD-10-CM

## 2024-07-09 DIAGNOSIS — M35.01 SICCA SYNDROME WITH KERATOCONJUNCTIVITIS: ICD-10-CM

## 2024-07-09 DIAGNOSIS — M79.645 PAIN IN LEFT FINGER(S): ICD-10-CM

## 2024-07-09 DIAGNOSIS — M77.8 OTHER ENTHESOPATHIES, NOT ELSEWHERE CLASSIFIED: ICD-10-CM

## 2024-07-09 DIAGNOSIS — Z96.651 PRESENCE OF RIGHT ARTIFICIAL KNEE JOINT: ICD-10-CM

## 2024-07-09 DIAGNOSIS — M15.9 POLYOSTEOARTHRITIS, UNSPECIFIED: ICD-10-CM

## 2024-07-09 DIAGNOSIS — M85.80 OTHER SPECIFIED DISORDERS OF BONE DENSITY AND STRUCTURE, UNSPECIFIED SITE: ICD-10-CM

## 2024-07-09 DIAGNOSIS — R76.8 OTHER SPECIFIED ABNORMAL IMMUNOLOGICAL FINDINGS IN SERUM: ICD-10-CM

## 2024-07-09 PROCEDURE — G2212 PROLONG OUTPT/OFFICE VIS: CPT

## 2024-07-09 PROCEDURE — 99215 OFFICE O/P EST HI 40 MIN: CPT

## 2024-07-09 PROCEDURE — G2211 COMPLEX E/M VISIT ADD ON: CPT

## 2024-07-12 ENCOUNTER — NON-APPOINTMENT (OUTPATIENT)
Age: 69
End: 2024-07-12

## 2024-07-13 RX ORDER — ZOLEDRONIC ACID 5 MG/100ML
5 INJECTION INTRAVENOUS
Refills: 0 | Status: ACTIVE | OUTPATIENT
Start: 2024-07-13

## 2024-07-13 RX ADMIN — ZOLEDRONIC ACID 0 MG/100ML: 5 INJECTION INTRAVENOUS at 00:00

## 2024-07-15 ENCOUNTER — APPOINTMENT (OUTPATIENT)
Dept: ORTHOPEDIC SURGERY | Facility: CLINIC | Age: 69
End: 2024-07-15
Payer: MEDICARE

## 2024-07-15 VITALS
SYSTOLIC BLOOD PRESSURE: 116 MMHG | DIASTOLIC BLOOD PRESSURE: 77 MMHG | BODY MASS INDEX: 39.65 KG/M2 | WEIGHT: 210 LBS | HEIGHT: 61 IN | HEART RATE: 73 BPM

## 2024-07-15 DIAGNOSIS — Z47.1 AFTERCARE FOLLOWING JOINT REPLACEMENT SURGERY: ICD-10-CM

## 2024-07-15 DIAGNOSIS — Z96.652 AFTERCARE FOLLOWING JOINT REPLACEMENT SURGERY: ICD-10-CM

## 2024-07-15 PROCEDURE — 73562 X-RAY EXAM OF KNEE 3: CPT | Mod: LT

## 2024-07-15 PROCEDURE — 99024 POSTOP FOLLOW-UP VISIT: CPT

## 2024-07-24 RX ORDER — CELECOXIB 200 MG/1
200 CAPSULE ORAL
Qty: 90 | Refills: 0 | Status: ACTIVE | COMMUNITY
Start: 2024-07-24 | End: 1900-01-01

## 2024-07-24 NOTE — PATIENT PROFILE ADULT. - ALCOHOL USE HISTORY SINGLE SELECT
Health Maintenance Topic(s) Outreach Outcomes & Actions Taken:    Colorectal Cancer Screening - Outreach Outcomes & Actions Taken  : External Records Uploaded, Care Team Updated, & History Updated if Applicable     Additional Notes:  Colonoscopy 3/27/23         
yes...

## 2024-08-27 ENCOUNTER — APPOINTMENT (OUTPATIENT)
Dept: RHEUMATOLOGY | Facility: CLINIC | Age: 69
End: 2024-08-27
Payer: MEDICARE

## 2024-08-27 VITALS
TEMPERATURE: 98.5 F | OXYGEN SATURATION: 99 % | RESPIRATION RATE: 14 BRPM | DIASTOLIC BLOOD PRESSURE: 77 MMHG | HEART RATE: 66 BPM | SYSTOLIC BLOOD PRESSURE: 116 MMHG

## 2024-08-27 VITALS
HEART RATE: 65 BPM | RESPIRATION RATE: 15 BRPM | DIASTOLIC BLOOD PRESSURE: 78 MMHG | SYSTOLIC BLOOD PRESSURE: 129 MMHG | OXYGEN SATURATION: 98 % | TEMPERATURE: 98.6 F

## 2024-08-27 PROCEDURE — 96374 THER/PROPH/DIAG INJ IV PUSH: CPT

## 2024-08-27 NOTE — HISTORY OF PRESENT ILLNESS
[Denies] : Denies [No] : No [Yes] : Yes [Informed consent documented in EHR.] : Informed consent documented in EHR. [TB] : Tuberculosis screening [Total Hep B core AB] : total Hepatitis B Core antibody [Left upper extremity] : Left upper extremity [22g] : 22g [Start Time: ___] : Medication Start Time: [unfilled] [End Time: ___] : Medication End Time: [unfilled] [Medication Name: ___] : Medication Name: [unfilled] [Total Amount Administered: ___] : Total Amount Administered: [unfilled] [IV discontinued. Intact. No signs or symptoms of IV complications noted. Time: ___] : IV discontinued. Intact. No signs or symptoms of IV complications noted. Time: [unfilled] [Patient  instructed to seek medical attention with signs and symptoms of adverse effects] : Patient  instructed to seek medical attention with signs and symptoms of adverse effects [Patient left unit in no acute distress] : Patient left unit in no acute distress [Medications administered as ordered and tolerated well.] : Medications administered as ordered and tolerated well. [de-identified] : 11:22am

## 2024-09-05 ENCOUNTER — APPOINTMENT (OUTPATIENT)
Dept: GASTROENTEROLOGY | Facility: GI CENTER | Age: 69
End: 2024-09-05

## 2024-10-10 ENCOUNTER — APPOINTMENT (OUTPATIENT)
Dept: RHEUMATOLOGY | Facility: CLINIC | Age: 69
End: 2024-10-10
Payer: MEDICARE

## 2024-10-10 VITALS
SYSTOLIC BLOOD PRESSURE: 120 MMHG | TEMPERATURE: 98.3 F | BODY MASS INDEX: 40.02 KG/M2 | HEIGHT: 61 IN | DIASTOLIC BLOOD PRESSURE: 70 MMHG | WEIGHT: 212 LBS | OXYGEN SATURATION: 97 % | HEART RATE: 70 BPM

## 2024-10-10 DIAGNOSIS — M85.80 OTHER SPECIFIED DISORDERS OF BONE DENSITY AND STRUCTURE, UNSPECIFIED SITE: ICD-10-CM

## 2024-10-10 DIAGNOSIS — M25.551 PAIN IN RIGHT HIP: ICD-10-CM

## 2024-10-10 DIAGNOSIS — Z79.1 LONG TERM (CURRENT) USE OF NON-STEROIDAL ANTI-INFLAMMATORIES (NSAID): ICD-10-CM

## 2024-10-10 DIAGNOSIS — Z96.652 PRESENCE OF LEFT ARTIFICIAL KNEE JOINT: ICD-10-CM

## 2024-10-10 DIAGNOSIS — M35.01 SICCA SYNDROME WITH KERATOCONJUNCTIVITIS: ICD-10-CM

## 2024-10-10 DIAGNOSIS — M75.21 BICIPITAL TENDINITIS, RIGHT SHOULDER: ICD-10-CM

## 2024-10-10 DIAGNOSIS — G89.29 LOW BACK PAIN, UNSPECIFIED: ICD-10-CM

## 2024-10-10 DIAGNOSIS — R76.8 OTHER SPECIFIED ABNORMAL IMMUNOLOGICAL FINDINGS IN SERUM: ICD-10-CM

## 2024-10-10 DIAGNOSIS — M15.9 POLYOSTEOARTHRITIS, UNSPECIFIED: ICD-10-CM

## 2024-10-10 DIAGNOSIS — M75.22 BICIPITAL TENDINITIS, LEFT SHOULDER: ICD-10-CM

## 2024-10-10 DIAGNOSIS — M54.50 LOW BACK PAIN, UNSPECIFIED: ICD-10-CM

## 2024-10-10 DIAGNOSIS — M79.7 FIBROMYALGIA: ICD-10-CM

## 2024-10-10 PROCEDURE — G2212 PROLONG OUTPT/OFFICE VIS: CPT

## 2024-10-10 PROCEDURE — G2211 COMPLEX E/M VISIT ADD ON: CPT

## 2024-10-10 PROCEDURE — 99215 OFFICE O/P EST HI 40 MIN: CPT

## 2024-10-13 RX ORDER — PREGABALIN 100 MG/1
100 CAPSULE ORAL
Qty: 60 | Refills: 3 | Status: ACTIVE | COMMUNITY
Start: 2024-10-13 | End: 1900-01-01

## 2024-10-14 ENCOUNTER — APPOINTMENT (OUTPATIENT)
Dept: ORTHOPEDIC SURGERY | Facility: CLINIC | Age: 69
End: 2024-10-14

## 2024-10-16 ENCOUNTER — APPOINTMENT (OUTPATIENT)
Dept: ORTHOPEDIC SURGERY | Facility: CLINIC | Age: 69
End: 2024-10-16
Payer: MEDICARE

## 2024-10-16 VITALS
BODY MASS INDEX: 40.02 KG/M2 | DIASTOLIC BLOOD PRESSURE: 83 MMHG | SYSTOLIC BLOOD PRESSURE: 140 MMHG | WEIGHT: 212 LBS | HEART RATE: 69 BPM | HEIGHT: 61 IN

## 2024-10-16 DIAGNOSIS — M53.3 SACROCOCCYGEAL DISORDERS, NOT ELSEWHERE CLASSIFIED: ICD-10-CM

## 2024-10-16 DIAGNOSIS — G89.29 SACROCOCCYGEAL DISORDERS, NOT ELSEWHERE CLASSIFIED: ICD-10-CM

## 2024-10-16 PROCEDURE — G2211 COMPLEX E/M VISIT ADD ON: CPT

## 2024-10-16 PROCEDURE — 99213 OFFICE O/P EST LOW 20 MIN: CPT

## 2024-10-16 PROCEDURE — 73502 X-RAY EXAM HIP UNI 2-3 VIEWS: CPT

## 2024-10-16 RX ORDER — DICLOFENAC SODIUM 10 MG/G
1 GEL TOPICAL DAILY
Qty: 1 | Refills: 1 | Status: ACTIVE | COMMUNITY
Start: 2024-10-16 | End: 1900-01-01

## 2024-10-29 ENCOUNTER — APPOINTMENT (OUTPATIENT)
Dept: OBGYN | Facility: CLINIC | Age: 69
End: 2024-10-29
Payer: MEDICARE

## 2024-10-29 ENCOUNTER — NON-APPOINTMENT (OUTPATIENT)
Age: 69
End: 2024-10-29

## 2024-10-29 VITALS
SYSTOLIC BLOOD PRESSURE: 122 MMHG | DIASTOLIC BLOOD PRESSURE: 80 MMHG | BODY MASS INDEX: 36.86 KG/M2 | WEIGHT: 208 LBS | HEART RATE: 80 BPM | HEIGHT: 63 IN

## 2024-10-29 DIAGNOSIS — Z01.419 ENCOUNTER FOR GYNECOLOGICAL EXAMINATION (GENERAL) (ROUTINE) W/OUT ABNORMAL FINDINGS: ICD-10-CM

## 2024-10-29 DIAGNOSIS — M85.80 OTHER SPECIFIED DISORDERS OF BONE DENSITY AND STRUCTURE, UNSPECIFIED SITE: ICD-10-CM

## 2024-10-29 DIAGNOSIS — Z11.51 ENCOUNTER FOR SCREENING FOR HUMAN PAPILLOMAVIRUS (HPV): ICD-10-CM

## 2024-10-29 DIAGNOSIS — B96.89 ACUTE VAGINITIS: ICD-10-CM

## 2024-10-29 DIAGNOSIS — N76.0 ACUTE VAGINITIS: ICD-10-CM

## 2024-10-29 PROCEDURE — 99459 PELVIC EXAMINATION: CPT

## 2024-10-29 PROCEDURE — 99397 PER PM REEVAL EST PAT 65+ YR: CPT

## 2024-11-01 ENCOUNTER — ASOB RESULT (OUTPATIENT)
Age: 69
End: 2024-11-01

## 2024-11-01 ENCOUNTER — APPOINTMENT (OUTPATIENT)
Dept: OBGYN | Facility: CLINIC | Age: 69
End: 2024-11-01

## 2024-11-01 LAB — HPV HIGH+LOW RISK DNA PNL CVX: NOT DETECTED

## 2024-11-01 PROCEDURE — 76856 US EXAM PELVIC COMPLETE: CPT | Mod: 59

## 2024-11-01 PROCEDURE — 93975 VASCULAR STUDY: CPT

## 2024-11-01 PROCEDURE — 76830 TRANSVAGINAL US NON-OB: CPT

## 2024-11-01 RX ORDER — CLINDAMYCIN PHOSPHATE 20 MG/G
2 CREAM VAGINAL
Qty: 1 | Refills: 0 | Status: ACTIVE | COMMUNITY
Start: 2024-10-29

## 2024-11-04 LAB — CYTOLOGY CVX/VAG DOC THIN PREP: NORMAL

## 2024-11-05 NOTE — OCCUPATIONAL THERAPY INITIAL EVALUATION ADULT - GENERAL OBSERVATIONS, REHAB EVAL
Pt. received sitting at [facility] with PCA Becca present. NAD. +C/D/I Dressing to Right Knee, +Accordion Drain, +Heplock. Pt able to do Right straight leg raise. Universal Safety Interventions

## 2024-11-08 ENCOUNTER — APPOINTMENT (OUTPATIENT)
Dept: GASTROENTEROLOGY | Facility: CLINIC | Age: 69
End: 2024-11-08
Payer: MEDICARE

## 2024-11-08 VITALS
SYSTOLIC BLOOD PRESSURE: 127 MMHG | HEIGHT: 63 IN | DIASTOLIC BLOOD PRESSURE: 85 MMHG | HEART RATE: 68 BPM | WEIGHT: 212 LBS | OXYGEN SATURATION: 99 % | BODY MASS INDEX: 37.56 KG/M2

## 2024-11-08 DIAGNOSIS — K31.7 POLYP OF STOMACH AND DUODENUM: ICD-10-CM

## 2024-11-08 DIAGNOSIS — Z09 ENCOUNTER FOR FOLLOW-UP EXAMINATION AFTER COMPLETED TREATMENT FOR CONDITIONS OTHER THAN MALIGNANT NEOPLASM: ICD-10-CM

## 2024-11-08 PROCEDURE — 99214 OFFICE O/P EST MOD 30 MIN: CPT

## 2024-11-08 PROCEDURE — G2211 COMPLEX E/M VISIT ADD ON: CPT

## 2024-11-12 ENCOUNTER — APPOINTMENT (OUTPATIENT)
Dept: OBGYN | Facility: CLINIC | Age: 69
End: 2024-11-12
Payer: MEDICARE

## 2024-11-12 ENCOUNTER — LABORATORY RESULT (OUTPATIENT)
Age: 69
End: 2024-11-12

## 2024-11-12 VITALS
WEIGHT: 210 LBS | SYSTOLIC BLOOD PRESSURE: 111 MMHG | BODY MASS INDEX: 37.21 KG/M2 | DIASTOLIC BLOOD PRESSURE: 72 MMHG | HEIGHT: 63 IN | HEART RATE: 77 BPM

## 2024-11-12 DIAGNOSIS — R93.89 ABNORMAL FINDINGS ON DIAGNOSTIC IMAGING OF OTHER SPECIFIED BODY STRUCTURES: ICD-10-CM

## 2024-11-12 PROCEDURE — 58100 BIOPSY OF UTERUS LINING: CPT

## 2024-12-03 ENCOUNTER — OFFICE (OUTPATIENT)
Dept: URBAN - METROPOLITAN AREA CLINIC 104 | Facility: CLINIC | Age: 69
Setting detail: OPHTHALMOLOGY
End: 2024-12-03
Payer: MEDICARE

## 2024-12-03 DIAGNOSIS — H01.001: ICD-10-CM

## 2024-12-03 DIAGNOSIS — H43.813: ICD-10-CM

## 2024-12-03 DIAGNOSIS — H04.123: ICD-10-CM

## 2024-12-03 DIAGNOSIS — H25.13: ICD-10-CM

## 2024-12-03 DIAGNOSIS — H01.002: ICD-10-CM

## 2024-12-03 DIAGNOSIS — H01.004: ICD-10-CM

## 2024-12-03 DIAGNOSIS — H01.005: ICD-10-CM

## 2024-12-03 DIAGNOSIS — H40.013: ICD-10-CM

## 2024-12-03 PROCEDURE — 99213 OFFICE O/P EST LOW 20 MIN: CPT | Performed by: OPTOMETRIST

## 2024-12-03 PROCEDURE — 92083 EXTENDED VISUAL FIELD XM: CPT | Performed by: OPTOMETRIST

## 2024-12-03 ASSESSMENT — REFRACTION_CURRENTRX
OD_VPRISM_DIRECTION: SV
OS_VPRISM_DIRECTION: SV
OD_SPHERE: +4.00
OS_OVR_VA: 20/
OS_CYLINDER: SPH
OD_AXIS: 141
OD_OVR_VA: 20/
OD_CYLINDER: -0.50
OS_SPHERE: +3.50

## 2024-12-03 ASSESSMENT — REFRACTION_MANIFEST
OS_AXIS: 065
OD_VA1: 20/25-2
OS_VA1: 20/25-
OD_AXIS: 165
OD_CYLINDER: -0.75
OS_CYLINDER: -1.00
OD_SPHERE: +0.25
OS_SPHERE: +1.50

## 2024-12-03 ASSESSMENT — REFRACTION_AUTOREFRACTION
OD_SPHERE: +1.00
OS_SPHERE: +2.00
OD_CYLINDER: -0.75
OS_CYLINDER: -1.00
OS_AXIS: 067
OD_AXIS: 165

## 2024-12-03 ASSESSMENT — KERATOMETRY
OS_K1POWER_DIOPTERS: NO DATA
OD_K1POWER_DIOPTERS: NO DATA

## 2024-12-03 ASSESSMENT — LID EXAM ASSESSMENTS
OD_BLEPHARITIS: RLL RUL 2+
OS_BLEPHARITIS: LLL LUL 2+

## 2024-12-03 ASSESSMENT — VISUAL ACUITY
OS_BCVA: 20/40+2
OD_BCVA: 20/60-1

## 2024-12-03 ASSESSMENT — CONFRONTATIONAL VISUAL FIELD TEST (CVF)
OS_FINDINGS: FULL
OD_FINDINGS: FULL

## 2024-12-06 RX ORDER — AMOXICILLIN 500 MG/1
500 TABLET, FILM COATED ORAL
Qty: 4 | Refills: 2 | Status: ACTIVE | COMMUNITY
Start: 2024-12-06 | End: 1900-01-01

## 2024-12-11 ENCOUNTER — NON-APPOINTMENT (OUTPATIENT)
Age: 69
End: 2024-12-11

## 2024-12-11 RX ORDER — ASPIRIN ENTERIC COATED TABLETS 81 MG 81 MG/1
81 TABLET, DELAYED RELEASE ORAL
Qty: 90 | Refills: 0 | Status: ACTIVE | COMMUNITY
Start: 2024-12-11

## 2024-12-11 RX ORDER — NABUMETONE 750 MG/1
750 TABLET, FILM COATED ORAL
Qty: 180 | Refills: 0 | Status: ACTIVE | COMMUNITY
Start: 2024-12-11 | End: 1900-01-01

## 2024-12-16 ENCOUNTER — APPOINTMENT (OUTPATIENT)
Dept: ORTHOPEDIC SURGERY | Facility: CLINIC | Age: 69
End: 2024-12-16
Payer: MEDICARE

## 2024-12-16 VITALS — HEIGHT: 63 IN | BODY MASS INDEX: 37.21 KG/M2 | WEIGHT: 210 LBS

## 2024-12-16 DIAGNOSIS — M54.50 LOW BACK PAIN, UNSPECIFIED: ICD-10-CM

## 2024-12-16 DIAGNOSIS — M25.551 PAIN IN RIGHT HIP: ICD-10-CM

## 2024-12-16 DIAGNOSIS — G89.29 LOW BACK PAIN, UNSPECIFIED: ICD-10-CM

## 2024-12-16 PROCEDURE — G2211 COMPLEX E/M VISIT ADD ON: CPT

## 2024-12-16 PROCEDURE — 99213 OFFICE O/P EST LOW 20 MIN: CPT

## 2024-12-16 RX ORDER — OXAPROZIN 600 MG/1
600 TABLET ORAL
Qty: 180 | Refills: 0 | Status: ACTIVE | COMMUNITY
Start: 2024-12-16 | End: 1900-01-01

## 2024-12-18 ENCOUNTER — APPOINTMENT (OUTPATIENT)
Dept: DERMATOLOGY | Facility: CLINIC | Age: 69
End: 2024-12-18
Payer: MEDICARE

## 2024-12-18 PROCEDURE — 11102 TANGNTL BX SKIN SINGLE LES: CPT

## 2024-12-18 PROCEDURE — 99203 OFFICE O/P NEW LOW 30 MIN: CPT | Mod: 25

## 2025-01-07 ENCOUNTER — APPOINTMENT (OUTPATIENT)
Dept: RHEUMATOLOGY | Facility: CLINIC | Age: 70
End: 2025-01-07

## 2025-01-10 NOTE — ASU PATIENT PROFILE, ADULT - CAREGIVER
Home Blood Pressure Monitoring    Making sure your Blood Pressure remains under control is important to me.  Please check your Blood Pressure 2-3 times weekly.  Please call your primary care provider if your Blood Pressure is running higher than 140/90 on a consistent basis or if it is >170/100 at any time.  Thank you.    Please bring your blood pressure cuff in to your next visit so that we may check it for accuracy.   
Yes

## 2025-01-10 NOTE — ASU PATIENT PROFILE, ADULT - FALL HARM RISK - UNIVERSAL INTERVENTIONS
Bed in lowest position, wheels locked, appropriate side rails in place/Call bell, personal items and telephone in reach/Instruct patient to call for assistance before getting out of bed or chair/Non-slip footwear when patient is out of bed/[place] to call system/Physically safe environment - no spills, clutter or unnecessary equipment/Purposeful Proactive Rounding/Room/bathroom lighting operational, light cord in reach

## 2025-01-10 NOTE — ASU PATIENT PROFILE, ADULT - BARIATRIC
Jomar Thomas is a 72 y.o. male here for a non-provider visit for Testosterone injection.    Reason for injection: Hypogonadism  Order in MAR?: Yes  Patient supplied?:No  Minimum interval has been met for this injection (per MAR order): Yes    Order and dose verified by: MELQUIADES  Patient tolerated injection and no adverse effects were observed or reported: Yes    # of Administrations remaining in MAR: 2     no

## 2025-01-13 ENCOUNTER — APPOINTMENT (OUTPATIENT)
Dept: GASTROENTEROLOGY | Facility: HOSPITAL | Age: 70
End: 2025-01-13

## 2025-01-13 ENCOUNTER — RESULT REVIEW (OUTPATIENT)
Age: 70
End: 2025-01-13

## 2025-01-13 ENCOUNTER — TRANSCRIPTION ENCOUNTER (OUTPATIENT)
Age: 70
End: 2025-01-13

## 2025-01-13 ENCOUNTER — OUTPATIENT (OUTPATIENT)
Dept: OUTPATIENT SERVICES | Facility: HOSPITAL | Age: 70
LOS: 1 days | End: 2025-01-13
Payer: MEDICARE

## 2025-01-13 VITALS
RESPIRATION RATE: 17 BRPM | TEMPERATURE: 98 F | SYSTOLIC BLOOD PRESSURE: 118 MMHG | OXYGEN SATURATION: 98 % | DIASTOLIC BLOOD PRESSURE: 65 MMHG | HEART RATE: 68 BPM

## 2025-01-13 VITALS
RESPIRATION RATE: 11 BRPM | HEIGHT: 63 IN | SYSTOLIC BLOOD PRESSURE: 141 MMHG | OXYGEN SATURATION: 96 % | TEMPERATURE: 98 F | WEIGHT: 214.95 LBS | HEART RATE: 66 BPM | DIASTOLIC BLOOD PRESSURE: 73 MMHG

## 2025-01-13 DIAGNOSIS — Z98.890 OTHER SPECIFIED POSTPROCEDURAL STATES: Chronic | ICD-10-CM

## 2025-01-13 DIAGNOSIS — K31.7 POLYP OF STOMACH AND DUODENUM: ICD-10-CM

## 2025-01-13 DIAGNOSIS — N60.02 SOLITARY CYST OF LEFT BREAST: Chronic | ICD-10-CM

## 2025-01-13 PROCEDURE — 88305 TISSUE EXAM BY PATHOLOGIST: CPT

## 2025-01-13 PROCEDURE — 88342 IMHCHEM/IMCYTCHM 1ST ANTB: CPT | Mod: 26

## 2025-01-13 PROCEDURE — 88305 TISSUE EXAM BY PATHOLOGIST: CPT | Mod: 26

## 2025-01-13 PROCEDURE — 88342 IMHCHEM/IMCYTCHM 1ST ANTB: CPT

## 2025-01-13 PROCEDURE — 43239 EGD BIOPSY SINGLE/MULTIPLE: CPT

## 2025-01-13 NOTE — ASU PREOP CHECKLIST - HAND OFF
History: Follow-up diabetes: Thinks controlled: No.  Since last visit patient lost her insurance and had not been taking her medications for diabetes for several months.  She recently reestablished care with endocrinologist.  checks BS: Has Dexcom and now averages 180s  Takes lispro on a sliding scale and averages 8 units before meals and is also on Lantus 50 units nightly, not missing dose of medication, denies side effects medication  Eating healthy: Trying to exercise,: Is active as she has  [place]  Pertinent ROS: negative  For 9/20/2024 endocrinology consult: Patient seen to establish care A1c of 12.4 start Lantus 50 units every afternoon and lispro 8 units 3 times daily with meals lighting scale.  Was advised to see diabetes educator and in process of starting an insulin pump, was given Dexcom G7.  Hold off on Trulicity or other GLP-1 at this time.  Assessment: established condition   Plan: Reviewed most recent A1c from 4/9/2024 that is 12.4, diabetes is uncontrolled.    Reviewed goals of self-management and encouraged to check blood sugars 4+ times per day.    Retinal exam is not up-to-date and next due May 2024.    Advised on importance of following a low carbohydrate, heart healthy eating plan, 30 minutes exercise daily and  maintaining healthy weight.    Diabetic foot care reviewed  Continue regimen of: lispro sliding scale, Lantus 50 units nightly  Recheck A1c in within the next 6 weeks per endocrinology who is now managing this condition.      History: Follow-up vitamin D: does  take vitamin D supplement ergocalciferol 73884 units 2x week.  Assessment: established condition   Plan: Reviewed latest vitamin D lab from 5/23/2023 that is low at 14.2. Counseled to treat as follows:OTC Vitamin D3 ergocalciferol 21182 units 2x week.  Recheck vitamin D level and then give further recommendations.       History: Follow-up hyperlipidemia: Takes no med, stopped by endo  Assessment:  established condition   Plan: Recommend lifestyle changes, check labs and then give further recommendations.     History: f/u lumbar radiculopathy: Patient again started having low back pain that radiates down her left leg, feels it is worsening.  Because she lost her insurance she was only able to go to physical therapy 2 times and was not able to follow-up with her neurosurgeon.  She also feels it is worse when she first gets up and starts to walk, denies any falls or new activities.  Denies any numbness, tingling lower extremity, saddle anesthesia, urine or stool incontinence fevers or rashes.  She occasionally will take over-the-counter pain medication when it is more severe  9/12/2023 neurosurgery consult: Seen for low back pain radiating to left leg.  Diagnosed with lumbar spondylosis and radiculopathy at L5-S1, having side effects with Naprosyn so started on Celebrex 100 mg daily, advised PT and follow-up in 6 weeks for reevaluation may need MRI if symptoms persist  Assessment: established condition   Plan: Pertinent exam findings: Yes mild tenderness to palpation over bilateral paraspinal lumbar muscles, negative straight leg raise bilateral, full range of motion but does have pain with forward flexion  Monitor: The problem is worsening.  Evaluation:  Reviewed last neurosurgery consult note.  Advised to return to physical therapy and will refer back to neurosurgery who had been managing this condition.  Start Celebrex 200 mg daily .   Follow up with neurosurgeon has been managing this condition. Re-evaluate yearly or as needed    History: f/u irregular periods: Patient has IUD and had initially had some breakthrough bleeding on OCP.  She then lost her insurance.  Over the last few months she has had breakthrough bleeding, heavier.  She denies any pelvic pain  10/24/2023 gynecology consult: Seen by NP for breakthrough bleeding with IUD and advised OCP for taper instructions and should schedule follow-up with  gynecology  Assessment: established condition   Plan: Pertinent exam findings: No  Monitor: The problem is worsening.  Evaluation:  Reviewed last gynecology consult with patient.  Encouraged to keep appointment with gynecology as scheduled, will .   Follow-up with gynecology who is managing this condition. Re-evaluate yearly or as needed    History: chest pain: For the last 3 weeks has noted left-sided chest pain, sharp and can last up to 10 minutes, occurs several times a day at rest or with activity, feels it is a different kind of pain, does not have any associated symptoms such as dizziness, headedness, shoulder pain shortness of breath, palpitations.  Denies any new activity or trauma, leg swelling  Assessment: new condition   Plan: Pertinent exam findings: No; no reproducible chest wall tenderness to palpation, has full range of motion of the left shoulder  Monitor: The problem is newly identified.  Evaluation:  Patient has had chest pain in the past but this time different, 2/14/2023 had a stress test that was negative for ischemia. .  Counseled possible causes for pain but do not suspect it is cardiac related  4/25/2024 EKG: Normal sinus rhythm, rate 66, no ST changes no significant changes compared to last EKG  Counseled possible causes for her symptoms.  May be related to her uncontrolled diabetes.  Advised to monitor closely and if worsens to go to ED immediately  Follow up 6 to 8 weeks if feels her symptoms have not resolved or new symptoms develop      IN PERSON VISIT  Chief Complaint   Patient presents with   • Condition     Chronic Medical Conditions follow up    • Diabetes     Diabetes follow up, foot exam and poc a1c       SUBJECTIVE  HPI Queenie Bass is a 43 year old female who presents today for evaluation and treatment of : follow up DM/CHRONIC MEDICAL CONDITIONs: See assessment/plan for specifics  Time was spent today buildlng and reinforcing a trusting relationship with the patient  and coordinating complex care longitudinally  __________________________________________________________________________________________________________________   ASSESSMENT/PLAN  Problem List Items Addressed This Visit        Cardiac and Vasculature    Hyperlipidemia, mixed    Other chest pain    Relevant Orders    Electrocardiogram 12-Lead    CBC with Automated Differential    Comprehensive Metabolic Panel    Thyroid Stimulating Hormone Reflex    Electrocardiogram 12-Lead       Endocrine and Metabolic    Class 2 severe obesity due to excess calories with serious comorbidity and body mass index (BMI) of 36.0 to 36.9 in adult (CMD)    Type 1 diabetes mellitus with microalbuminuria (CMD) - Primary    Relevant Orders    SERVICE TO OPTOMETRY    Microalbumin Urine Random    Vitamin D deficiency    Relevant Orders    Vitamin D -25 Hydroxy       Genitourinary and Reproductive    Abnormal uterine bleeding    Relevant Orders    Iron And total Iron Binding Capacity    Ferritin       Neuro    Lumbar radiculopathy    Relevant Medications    celecoxib (CeleBREX) 200 MG capsule    Other Relevant Orders    SERVICE TO NEUROLOGICAL SURGERY    SERVICE TO PHYSICAL THERAPY   Other Visit Diagnoses     Type 1 diabetes mellitus with hyperglycemia (CMD)            Medications Discontinued During This Encounter   Medication Reason   • insulin lispro 100 UNIT/ML injectable solution Dose Adjustment   • Insulin Infusion Pump (INSULIN PUMP [id number]) Kit Therapy Completed   • atorvastatin (LIPITOR) 20 MG tablet Therapy Completed   • dulaglutide (TRULICITY) 3 MG/0.5ML pen-injector Therapy Completed   • fish oil-omega-3 fatty acids 1000 MG Cap Therapy Completed   • celecoxib (CeleBREX) 200 MG capsule Therapy Completed   • norethindrone-ethinyl estradiol-FE (JUNEL FE 1/20) 1-20 MG-MCG per tablet Therapy Completed     Current Outpatient Medications   Medication Sig Dispense Refill   • Insulin Lispro, 1 Unit Dial, (HumaLOG KwikPen) 100 UNIT/ML  pen-injector [None received]     • celecoxib (CeleBREX) 200 MG capsule Take 1 capsule by mouth daily. 30 capsule 2   • Continuous Blood Gluc Sensor (Dexcom G6 Sensor) Misc Use to test blood sugar 4x day 9 each 1   • Vitamin D, Ergocalciferol, 1.25 mg (50,000 units) capsule Take 1 capsule by mouth 2 days a week. On Mondays and fridays 24 capsule 0   • Lantus SoloStar 100 UNIT/ML pen-injector If insulin pump has any issues.     • Continuous Blood Gluc Transmit (Dexcom G6 Transmitter) Misc 1 each by Other route as needed.     • Continuous Blood Gluc  (Dexcom G6 ) Device USE AS DIRECTED     • Baqsimi Two Pack 3 MG/DOSE Powder USE 1 DOSE INTO NOSTRIL ONCE AS NEEDED     • levonorgestrel (MIRENA) (52 MG) 20 MCG/DAY intrauterine device by Intrauterine route 1 time. Indications: Excessive Amount of Menstrual Volume       No current facility-administered medications for this visit.     Denies OTC, herbals or supplements other than what is listed in med list  ALLERGIES:  ALLERGIES:  Amoxicillin and Shrimp extract allergy skin test  Screening:  Review Flowsheet  More data exists       4/25/2024   PHQ 2/9 Score   Adult PHQ 2 Score 0   Adult PHQ 2 Interpretation No further screening needed   Little interest or pleasure in activity? Not at all   Feeling down, depressed or hopeless? Not at all   Social Determinants of Health     Interpersonal Safety: Not on file   Social Connections: Not on file   Alcohol Use: Not on file   Tobacco Use: Medium Risk (4/25/2024)    Patient History    • Smoking Tobacco Use: Former    • Smokeless Tobacco Use: Never    • Passive Exposure: Not on file   Financial Resource Strain: Patient Declined (4/25/2024)    Financial Resource Strain    • Unable to Get: Patient declined   Stress: Low Risk  (4/7/2021)    Stress    • How Stressed: Not on file   Physical Activity: Insufficiently Active (1/28/2021)    Exercise Vital Sign    • Days of Exercise per Week: 3 days    • Minutes of Exercise per  Session: 40 min   Food Insecurity: Patient Declined (4/25/2024)    Food Insecurity    • Worried about Food: Patient declined    • Food is Gone: Patient declined   Transportation Needs: Patient Declined (4/25/2024)    Transportation Needs    • Lack of Reliable Transportation: Patient declined   Inadequate Housing: Not on file (8/13/2019)   Depression: Not at risk (4/25/2024)    PHQ-2    • PHQ-2 Score: 0   Utilities: Not on file     __________________________________________________________________________________________________________________  OBJECTIVE  /80 (BP Location: LUE - Left upper extremity, Patient Position: Sitting, Cuff Size: Large Adult)   Pulse 83   Temp 97.6 °F (36.4 °C)   Resp 18   Ht 5' 4\" (1.626 m)   Wt 83.9 kg (184 lb 15.5 oz)   LMP  (LMP Unknown)   BMI 31.75 kg/m²   BSA 1.89 m²   Physical Exam   ALL PERTINENT FINDINGS WILL BE IN THE ASSESSMENT/PLAN SECTION  Constitutional: General Appearance: well-developed, NAD, ambulating normally  Psychiatric: good judgement; normal mood and affect  Head: Head: normocephalic and atraumatic.  Eyes: Nonicteric  Neck: supple, FROM  Lungs: Clear to auscultation  Cardiovascular: Regular rate and rhythm  Musculoskeletal: Bilateral upper and lower extremities with FROM  Neurologic: Cranial Nerves grossly intact  Skin: moist, nonicteric   FOOT exam: inspected: Bilateral +2/4 dorsalis pulses;  no calluses, skin changes or ulcers; dryness NO, peeling skin: NO;  monofilament test performed and sensation including the dorsal aspect of feet normal  No results found for this visit on 04/25/24.  __________________________________________________________________________________________________________________________________________  Reviewed and updated past medical, family, surgical history if needed. Refer to History portion of pt record  REVIEW OF SYSTEMS PER patient: negative except as documented above    Total time spent on date of service was 50 minutes.   Time spent includes some or all of the following: Both face-to-face time, nonface-to-face time, personally reviewing records or discussing history or plan with colleagues, obtaining and reviewing the history, performing a medically appropriate exam/evaluation, counseling patient and/or caregiver, documentation, placing orders, referrals and coordinating care all on date of service.    Return in about 3 months (around 7/25/2024) for f/u on chronic condition, fasting labs prior.  Electronically signed by: Deya Estrella MD  4/25/2024     - 7.7 K/mcL    Absolute Lymphocytes 1.8 1.0 - 4.8 K/mcL    Absolute Monocytes 0.5 0.3 - 0.9 K/mcL    Absolute Eosinophils  0.2 0.0 - 0.5 K/mcL    Absolute Basophils 0.1 0.0 - 0.3 K/mcL    Absolute Immature Granulocytes 0.0 0.0 - 0.2 K/mcL     __________________________________________________________________________________________________________________________________________  Reviewed and updated past medical, family, surgical history if needed. Refer to History portion of pt record  REVIEW OF SYSTEMS PER patient: negative except as documented above    Total time spent on date of service was 50 minutes.  Time spent includes some or all of the following: Both face-to-face time, nonface-to-face time, personally reviewing records or discussing history or plan with colleagues, obtaining and reviewing the history, performing a medically appropriate exam/evaluation, counseling patient and/or caregiver, documentation, placing orders, referrals and coordinating care all on date of service.    Return in about 3 months (around 7/25/2024) for f/u on chronic condition, fasting labs prior.    Total time spent on date of service was 40 minutes.  Time spent includes some or all of the following: Both face-to-face time, nonface-to-face time, personally reviewing records or discussing history or plan with colleagues, obtaining and reviewing the history, performing a medically appropriate exam/evaluation, counseling patient and/or caregiver, documentation, placing orders, referrals and coordinating care all on date of service.    Electronically signed by: Deya Estrella MD  4/28/2024     Unit RN to OR RN

## 2025-01-13 NOTE — ASU PREOP CHECKLIST - VIA
stretcher Minoxidil Counseling: Minoxidil is a topical medication which can increase blood flow where it is applied. It is uncertain how this medication increases hair growth. Side effects are uncommon and include stinging and allergic reactions.

## 2025-01-16 LAB — SURGICAL PATHOLOGY STUDY: SIGNIFICANT CHANGE UP

## 2025-01-17 ENCOUNTER — APPOINTMENT (OUTPATIENT)
Dept: PHYSICAL MEDICINE AND REHAB | Facility: CLINIC | Age: 70
End: 2025-01-17
Payer: MEDICARE

## 2025-01-17 VITALS — WEIGHT: 211.25 LBS | HEIGHT: 63 IN | BODY MASS INDEX: 37.43 KG/M2 | RESPIRATION RATE: 15 BRPM

## 2025-01-17 DIAGNOSIS — M53.3 SACROCOCCYGEAL DISORDERS, NOT ELSEWHERE CLASSIFIED: ICD-10-CM

## 2025-01-17 DIAGNOSIS — G89.29 LOW BACK PAIN, UNSPECIFIED: ICD-10-CM

## 2025-01-17 DIAGNOSIS — M54.50 LOW BACK PAIN, UNSPECIFIED: ICD-10-CM

## 2025-01-17 DIAGNOSIS — G89.29 SACROCOCCYGEAL DISORDERS, NOT ELSEWHERE CLASSIFIED: ICD-10-CM

## 2025-01-17 DIAGNOSIS — M67.951 UNSPECIFIED DISORDER OF SYNOVIUM AND TENDON, RIGHT THIGH: ICD-10-CM

## 2025-01-17 PROCEDURE — 99204 OFFICE O/P NEW MOD 45 MIN: CPT

## 2025-01-17 RX ORDER — DICLOFENAC SODIUM 10 MG/G
1 GEL TOPICAL 4 TIMES DAILY
Qty: 100 | Refills: 3 | Status: ACTIVE | COMMUNITY
Start: 2025-01-17 | End: 1900-01-01

## 2025-01-17 RX ORDER — METHOCARBAMOL 500 MG/1
500 TABLET, FILM COATED ORAL
Qty: 30 | Refills: 0 | Status: ACTIVE | COMMUNITY
Start: 2025-01-17 | End: 1900-01-01

## 2025-01-30 ENCOUNTER — APPOINTMENT (OUTPATIENT)
Dept: RHEUMATOLOGY | Facility: CLINIC | Age: 70
End: 2025-01-30
Payer: MEDICARE

## 2025-01-30 ENCOUNTER — NON-APPOINTMENT (OUTPATIENT)
Age: 70
End: 2025-01-30

## 2025-01-30 VITALS
HEIGHT: 63 IN | SYSTOLIC BLOOD PRESSURE: 120 MMHG | HEART RATE: 88 BPM | WEIGHT: 211 LBS | TEMPERATURE: 98 F | OXYGEN SATURATION: 99 % | DIASTOLIC BLOOD PRESSURE: 80 MMHG | BODY MASS INDEX: 37.39 KG/M2

## 2025-01-30 DIAGNOSIS — M79.7 FIBROMYALGIA: ICD-10-CM

## 2025-01-30 DIAGNOSIS — M75.22 BICIPITAL TENDINITIS, LEFT SHOULDER: ICD-10-CM

## 2025-01-30 DIAGNOSIS — M15.9 POLYOSTEOARTHRITIS, UNSPECIFIED: ICD-10-CM

## 2025-01-30 DIAGNOSIS — R76.8 OTHER SPECIFIED ABNORMAL IMMUNOLOGICAL FINDINGS IN SERUM: ICD-10-CM

## 2025-01-30 DIAGNOSIS — Z96.652 PRESENCE OF LEFT ARTIFICIAL KNEE JOINT: ICD-10-CM

## 2025-01-30 DIAGNOSIS — G89.29 LOW BACK PAIN, UNSPECIFIED: ICD-10-CM

## 2025-01-30 DIAGNOSIS — M85.80 OTHER SPECIFIED DISORDERS OF BONE DENSITY AND STRUCTURE, UNSPECIFIED SITE: ICD-10-CM

## 2025-01-30 DIAGNOSIS — Z79.1 LONG TERM (CURRENT) USE OF NON-STEROIDAL ANTI-INFLAMMATORIES (NSAID): ICD-10-CM

## 2025-01-30 DIAGNOSIS — M35.01 SICCA SYNDROME WITH KERATOCONJUNCTIVITIS: ICD-10-CM

## 2025-01-30 DIAGNOSIS — M54.50 LOW BACK PAIN, UNSPECIFIED: ICD-10-CM

## 2025-01-30 DIAGNOSIS — M75.21 BICIPITAL TENDINITIS, RIGHT SHOULDER: ICD-10-CM

## 2025-01-30 PROCEDURE — 99215 OFFICE O/P EST HI 40 MIN: CPT

## 2025-01-30 PROCEDURE — G2212 PROLONG OUTPT/OFFICE VIS: CPT

## 2025-01-30 PROCEDURE — G2211 COMPLEX E/M VISIT ADD ON: CPT

## 2025-01-31 RX ORDER — METHOCARBAMOL 750 MG/1
750 TABLET, FILM COATED ORAL
Qty: 540 | Refills: 0 | Status: ACTIVE | COMMUNITY
Start: 2025-01-31 | End: 1900-01-01

## 2025-02-06 RX ORDER — TIZANIDINE 2 MG/1
2 TABLET ORAL
Qty: 360 | Refills: 0 | Status: ACTIVE | COMMUNITY
Start: 2025-02-06 | End: 1900-01-01

## 2025-02-07 ENCOUNTER — APPOINTMENT (OUTPATIENT)
Dept: DERMATOLOGY | Facility: CLINIC | Age: 70
End: 2025-02-07
Payer: MEDICARE

## 2025-02-07 DIAGNOSIS — C44.319 BASAL CELL CARCINOMA OF SKIN OF OTHER PARTS OF FACE: ICD-10-CM

## 2025-02-07 PROCEDURE — 99214 OFFICE O/P EST MOD 30 MIN: CPT

## 2025-02-25 ENCOUNTER — APPOINTMENT (OUTPATIENT)
Dept: DERMATOLOGY | Facility: CLINIC | Age: 70
End: 2025-02-25
Payer: MEDICARE

## 2025-02-25 ENCOUNTER — RESULT REVIEW (OUTPATIENT)
Age: 70
End: 2025-02-25

## 2025-02-25 PROCEDURE — 11102 TANGNTL BX SKIN SINGLE LES: CPT

## 2025-02-25 PROCEDURE — 99213 OFFICE O/P EST LOW 20 MIN: CPT | Mod: 25

## 2025-03-05 ENCOUNTER — APPOINTMENT (OUTPATIENT)
Dept: PHYSICAL MEDICINE AND REHAB | Facility: CLINIC | Age: 70
End: 2025-03-05
Payer: MEDICARE

## 2025-03-05 VITALS
DIASTOLIC BLOOD PRESSURE: 81 MMHG | BODY MASS INDEX: 37.21 KG/M2 | SYSTOLIC BLOOD PRESSURE: 126 MMHG | HEIGHT: 63 IN | WEIGHT: 210 LBS | HEART RATE: 69 BPM | RESPIRATION RATE: 14 BRPM

## 2025-03-05 DIAGNOSIS — M67.951 UNSPECIFIED DISORDER OF SYNOVIUM AND TENDON, RIGHT THIGH: ICD-10-CM

## 2025-03-05 DIAGNOSIS — M62.838 OTHER MUSCLE SPASM: ICD-10-CM

## 2025-03-05 PROCEDURE — 99214 OFFICE O/P EST MOD 30 MIN: CPT

## 2025-03-10 ENCOUNTER — APPOINTMENT (OUTPATIENT)
Dept: ORTHOPEDIC SURGERY | Facility: CLINIC | Age: 70
End: 2025-03-10
Payer: MEDICARE

## 2025-03-10 VITALS
BODY MASS INDEX: 37.21 KG/M2 | WEIGHT: 210 LBS | HEIGHT: 63 IN | SYSTOLIC BLOOD PRESSURE: 153 MMHG | DIASTOLIC BLOOD PRESSURE: 85 MMHG

## 2025-03-10 DIAGNOSIS — M70.61 TROCHANTERIC BURSITIS, RIGHT HIP: ICD-10-CM

## 2025-03-10 PROCEDURE — G2211 COMPLEX E/M VISIT ADD ON: CPT

## 2025-03-10 PROCEDURE — 99213 OFFICE O/P EST LOW 20 MIN: CPT

## 2025-03-10 RX ORDER — METHYLPREDNISOLONE 4 MG/1
4 TABLET ORAL
Qty: 1 | Refills: 0 | Status: ACTIVE | COMMUNITY
Start: 2025-03-10 | End: 1900-01-01

## 2025-03-12 ENCOUNTER — APPOINTMENT (OUTPATIENT)
Dept: DERMATOLOGY | Facility: CLINIC | Age: 70
End: 2025-03-12
Payer: MEDICARE

## 2025-03-12 ENCOUNTER — NON-APPOINTMENT (OUTPATIENT)
Age: 70
End: 2025-03-12

## 2025-03-12 DIAGNOSIS — C44.319 BASAL CELL CARCINOMA OF SKIN OF OTHER PARTS OF FACE: ICD-10-CM

## 2025-03-12 PROCEDURE — 12052 INTMD RPR FACE/MM 2.6-5.0 CM: CPT

## 2025-03-12 PROCEDURE — 17311 MOHS 1 STAGE H/N/HF/G: CPT

## 2025-03-12 RX ORDER — MUPIROCIN 20 MG/G
2 OINTMENT TOPICAL TWICE DAILY
Qty: 1 | Refills: 6 | Status: ACTIVE | COMMUNITY
Start: 2025-03-12 | End: 1900-01-01

## 2025-03-14 ENCOUNTER — NON-APPOINTMENT (OUTPATIENT)
Age: 70
End: 2025-03-14

## 2025-03-31 ENCOUNTER — APPOINTMENT (OUTPATIENT)
Dept: ORTHOPEDIC SURGERY | Facility: CLINIC | Age: 70
End: 2025-03-31
Payer: MEDICARE

## 2025-03-31 VITALS
SYSTOLIC BLOOD PRESSURE: 115 MMHG | HEIGHT: 63 IN | BODY MASS INDEX: 37.21 KG/M2 | WEIGHT: 210 LBS | DIASTOLIC BLOOD PRESSURE: 73 MMHG

## 2025-03-31 DIAGNOSIS — Z47.1 AFTERCARE FOLLOWING JOINT REPLACEMENT SURGERY: ICD-10-CM

## 2025-03-31 DIAGNOSIS — Z96.652 AFTERCARE FOLLOWING JOINT REPLACEMENT SURGERY: ICD-10-CM

## 2025-03-31 DIAGNOSIS — Z96.651 PRESENCE OF RIGHT ARTIFICIAL KNEE JOINT: ICD-10-CM

## 2025-03-31 PROCEDURE — 99213 OFFICE O/P EST LOW 20 MIN: CPT

## 2025-03-31 PROCEDURE — G2211 COMPLEX E/M VISIT ADD ON: CPT

## 2025-03-31 PROCEDURE — 73562 X-RAY EXAM OF KNEE 3: CPT | Mod: 50

## 2025-04-08 NOTE — DISCHARGE NOTE NURSING/CASE MANAGEMENT/SOCIAL WORK - NSDCPEFALRISK_GEN_ALL_CORE
For information on Fall & Injury Prevention, visit: [email or web address] OR  [email or web address] OR  https://www.cdc.gov/steadi/patient.html
Robb Green

## 2025-05-19 ENCOUNTER — APPOINTMENT (OUTPATIENT)
Dept: ORTHOPEDIC SURGERY | Facility: CLINIC | Age: 70
End: 2025-05-19

## 2025-05-23 NOTE — ASU PREOP CHECKLIST - WEIGHT IN KG
LOV with Dr. Ba on 6/10/24. Pt seen Nanette on 11/6 for pink eye, and Dr. Fang on 11/27/24 for eye pain. Pt has an appt on 6/9 with Dr. Ba for a physical. Pt was called and states that she has been dealing with bilateral knee pain for since in her 20's, but yesterday her right knee seems to be bothering her more lately. Pt is asking to be evaluated and possibly seeing ortho or PT for pain. No swelling. Does have pain with knee bending. Appt scheduled today.   
Patient called stating her knee pain in getting worse and wanted to if she was able to get a referral for physical therapy or ortho.          Please call to advise. [phone]    
97.5

## 2025-06-05 ENCOUNTER — APPOINTMENT (OUTPATIENT)
Dept: RHEUMATOLOGY | Facility: CLINIC | Age: 70
End: 2025-06-05

## 2025-06-05 ENCOUNTER — LABORATORY RESULT (OUTPATIENT)
Age: 70
End: 2025-06-05

## 2025-06-05 VITALS
HEART RATE: 66 BPM | TEMPERATURE: 97.7 F | HEIGHT: 63 IN | DIASTOLIC BLOOD PRESSURE: 74 MMHG | SYSTOLIC BLOOD PRESSURE: 114 MMHG | OXYGEN SATURATION: 98 %

## 2025-06-05 DIAGNOSIS — R76.8 OTHER SPECIFIED ABNORMAL IMMUNOLOGICAL FINDINGS IN SERUM: ICD-10-CM

## 2025-06-05 DIAGNOSIS — G89.29 LOW BACK PAIN, UNSPECIFIED: ICD-10-CM

## 2025-06-05 DIAGNOSIS — M25.551 PAIN IN RIGHT HIP: ICD-10-CM

## 2025-06-05 DIAGNOSIS — M75.21 BICIPITAL TENDINITIS, RIGHT SHOULDER: ICD-10-CM

## 2025-06-05 DIAGNOSIS — M15.9 POLYOSTEOARTHRITIS, UNSPECIFIED: ICD-10-CM

## 2025-06-05 DIAGNOSIS — Z96.652 PRESENCE OF LEFT ARTIFICIAL KNEE JOINT: ICD-10-CM

## 2025-06-05 DIAGNOSIS — M79.7 FIBROMYALGIA: ICD-10-CM

## 2025-06-05 DIAGNOSIS — Z79.1 LONG TERM (CURRENT) USE OF NON-STEROIDAL ANTI-INFLAMMATORIES (NSAID): ICD-10-CM

## 2025-06-05 DIAGNOSIS — M75.22 BICIPITAL TENDINITIS, LEFT SHOULDER: ICD-10-CM

## 2025-06-05 DIAGNOSIS — M25.552 PAIN IN RIGHT HIP: ICD-10-CM

## 2025-06-05 DIAGNOSIS — M35.01 SICCA SYNDROME WITH KERATOCONJUNCTIVITIS: ICD-10-CM

## 2025-06-05 DIAGNOSIS — M54.50 LOW BACK PAIN, UNSPECIFIED: ICD-10-CM

## 2025-06-05 PROCEDURE — G2212 PROLONG OUTPT/OFFICE VIS: CPT

## 2025-06-05 PROCEDURE — 99215 OFFICE O/P EST HI 40 MIN: CPT

## 2025-06-05 PROCEDURE — G2211 COMPLEX E/M VISIT ADD ON: CPT

## 2025-06-07 LAB
ALBUMIN SERPL ELPH-MCNC: 4.6 G/DL
ALDOLASE SERPL-CCNC: 5.5 U/L
ALP BLD-CCNC: 69 U/L
ALT SERPL-CCNC: 29 U/L
ANION GAP SERPL CALC-SCNC: 14 MMOL/L
APPEARANCE: CLEAR
AST SERPL-CCNC: 23 U/L
BACTERIA: NEGATIVE /HPF
BASOPHILS # BLD AUTO: 0.05 K/UL
BASOPHILS NFR BLD AUTO: 0.9 %
BILIRUB SERPL-MCNC: 0.6 MG/DL
BILIRUBIN URINE: NEGATIVE
BLOOD URINE: NEGATIVE
BUN SERPL-MCNC: 16 MG/DL
CALCIUM SERPL-MCNC: 10 MG/DL
CAST: 0 /LPF
CHLORIDE SERPL-SCNC: 102 MMOL/L
CK SERPL-CCNC: 115 U/L
CO2 SERPL-SCNC: 25 MMOL/L
COLOR: YELLOW
CREAT SERPL-MCNC: 0.67 MG/DL
CRP SERPL-MCNC: 7 MG/L
EGFRCR SERPLBLD CKD-EPI 2021: 95 ML/MIN/1.73M2
ENA SS-A AB SER IA-ACNC: <0.2 AL
ENA SS-B AB SER IA-ACNC: <0.2 AL
EOSINOPHIL # BLD AUTO: 0.08 K/UL
EOSINOPHIL NFR BLD AUTO: 1.5 %
EPITHELIAL CELLS: 2 /HPF
ERYTHROCYTE [SEDIMENTATION RATE] IN BLOOD BY WESTERGREN METHOD: 18 MM/HR
GLUCOSE QUALITATIVE U: NEGATIVE MG/DL
GLUCOSE SERPL-MCNC: 105 MG/DL
HCT VFR BLD CALC: 42.9 %
HGB BLD-MCNC: 13.9 G/DL
IMM GRANULOCYTES NFR BLD AUTO: 0.2 %
KETONES URINE: NEGATIVE MG/DL
LDH SERPL-CCNC: 187 U/L
LEUKOCYTE ESTERASE URINE: NEGATIVE
LYMPHOCYTES # BLD AUTO: 1.84 K/UL
LYMPHOCYTES NFR BLD AUTO: 34.6 %
MAGNESIUM SERPL-MCNC: 2.1 MG/DL
MAN DIFF?: NORMAL
MCHC RBC-ENTMCNC: 28.1 PG
MCHC RBC-ENTMCNC: 32.4 G/DL
MCV RBC AUTO: 86.7 FL
MICROSCOPIC-UA: NORMAL
MONOCYTES # BLD AUTO: 0.51 K/UL
MONOCYTES NFR BLD AUTO: 9.6 %
NEUTROPHILS # BLD AUTO: 2.83 K/UL
NEUTROPHILS NFR BLD AUTO: 53.2 %
NITRITE URINE: NEGATIVE
PH URINE: 5.5
PHOSPHATE SERPL-MCNC: 3.6 MG/DL
PLATELET # BLD AUTO: 322 K/UL
POTASSIUM SERPL-SCNC: 3.7 MMOL/L
PROT SERPL-MCNC: 7.1 G/DL
PROTEIN URINE: NEGATIVE MG/DL
RBC # BLD: 4.95 M/UL
RBC # FLD: 13.3 %
RED BLOOD CELLS URINE: 2 /HPF
SODIUM SERPL-SCNC: 141 MMOL/L
SPECIFIC GRAVITY URINE: 1.02
UROBILINOGEN URINE: 0.2 MG/DL
WBC # FLD AUTO: 5.32 K/UL
WHITE BLOOD CELLS URINE: 0 /HPF

## 2025-06-07 RX ORDER — METHYLPREDNISOLONE 4 MG/1
4 TABLET ORAL
Qty: 65 | Refills: 0 | Status: ACTIVE | COMMUNITY
Start: 2025-06-07 | End: 1900-01-01

## 2025-06-07 RX ORDER — ZOLEDRONIC ACID 5 MG/100ML
5 INJECTION INTRAVENOUS
Refills: 0 | Status: COMPLETED | OUTPATIENT
Start: 2025-06-07 | End: 1900-01-01

## 2025-06-07 RX ORDER — BUPROPION HYDROCHLORIDE 150 MG/1
150 TABLET, FILM COATED, EXTENDED RELEASE ORAL
Qty: 90 | Refills: 1 | Status: ACTIVE | COMMUNITY
Start: 2025-06-07 | End: 1900-01-01

## 2025-06-07 RX ADMIN — ZOLEDRONIC ACID 0 MG/100ML: 5 INJECTION INTRAVENOUS at 00:00

## 2025-06-10 ENCOUNTER — OFFICE (OUTPATIENT)
Dept: URBAN - METROPOLITAN AREA CLINIC 104 | Facility: CLINIC | Age: 70
Setting detail: OPHTHALMOLOGY
End: 2025-06-10
Payer: MEDICARE

## 2025-06-10 DIAGNOSIS — H01.001: ICD-10-CM

## 2025-06-10 DIAGNOSIS — H01.004: ICD-10-CM

## 2025-06-10 DIAGNOSIS — H25.13: ICD-10-CM

## 2025-06-10 DIAGNOSIS — H40.013: ICD-10-CM

## 2025-06-10 DIAGNOSIS — H01.005: ICD-10-CM

## 2025-06-10 DIAGNOSIS — H35.51: ICD-10-CM

## 2025-06-10 DIAGNOSIS — H43.813: ICD-10-CM

## 2025-06-10 DIAGNOSIS — H04.123: ICD-10-CM

## 2025-06-10 DIAGNOSIS — H01.002: ICD-10-CM

## 2025-06-10 PROCEDURE — 92014 COMPRE OPH EXAM EST PT 1/>: CPT | Performed by: OPTOMETRIST

## 2025-06-10 PROCEDURE — 92133 CPTRZD OPH DX IMG PST SGM ON: CPT | Performed by: OPTOMETRIST

## 2025-06-10 ASSESSMENT — REFRACTION_CURRENTRX
OD_CYLINDER: -0.50
OD_VPRISM_DIRECTION: SV
OD_SPHERE: +4.00
OS_CYLINDER: SPH
OD_AXIS: 141
OD_OVR_VA: 20/
OS_SPHERE: +3.50
OS_OVR_VA: 20/
OS_VPRISM_DIRECTION: SV

## 2025-06-10 ASSESSMENT — KERATOMETRY
OD_K1POWER_DIOPTERS: 43.44
OS_K1POWER_DIOPTERS: 43.60
OD_K2POWER_DIOPTERS: 44.94
OD_AXISANGLE_DEGREES: 086
OS_AXISANGLE_DEGREES: 091
OS_K2POWER_DIOPTERS: 44.70

## 2025-06-10 ASSESSMENT — CONFRONTATIONAL VISUAL FIELD TEST (CVF)
OS_FINDINGS: FULL
OD_FINDINGS: FULL

## 2025-06-10 ASSESSMENT — REFRACTION_MANIFEST
OD_VA1: 20/25-2
OD_SPHERE: +0.25
OD_AXIS: 165
OS_AXIS: 065
OS_CYLINDER: -1.00
OS_VA1: 20/25-
OS_SPHERE: +1.50
OD_CYLINDER: -0.75

## 2025-06-10 ASSESSMENT — LID EXAM ASSESSMENTS
OD_BLEPHARITIS: RLL RUL 2+
OS_BLEPHARITIS: LLL LUL 2+

## 2025-06-10 ASSESSMENT — REFRACTION_AUTOREFRACTION
OD_AXIS: 107
OS_CYLINDER: -1.00
OD_SPHERE: +1.00
OS_SPHERE: +2.25
OS_AXIS: 057
OD_CYLINDER: -0.50

## 2025-06-10 ASSESSMENT — VISUAL ACUITY
OD_BCVA: 20/40-
OS_BCVA: 20/25-

## 2025-06-11 ENCOUNTER — NON-APPOINTMENT (OUTPATIENT)
Age: 70
End: 2025-06-11

## 2025-06-23 NOTE — PHYSICAL EXAM
[FreeTextEntry1] : MMR #2 -  ALENA.  [General Appearance - Alert] : alert [General Appearance - In No Acute Distress] : in no acute distress [Auscultation Breath Sounds / Voice Sounds] : lungs were clear to auscultation bilaterally [Heart Rate And Rhythm] : heart rate was normal and rhythm regular [Heart Sounds] : normal S1 and S2 [Heart Sounds Gallop] : no gallops [Murmurs] : no murmurs [Heart Sounds Pericardial Friction Rub] : no pericardial rub [Bowel Sounds] : normal bowel sounds [Abdomen Soft] : soft [Abdomen Tenderness] : non-tender [] : no hepato-splenomegaly [Abdomen Mass (___ Cm)] : no abdominal mass palpated [Oriented To Time, Place, And Person] : oriented to person, place, and time [Impaired Insight] : insight and judgment were intact [Affect] : the affect was normal

## 2025-08-13 ENCOUNTER — NON-APPOINTMENT (OUTPATIENT)
Age: 70
End: 2025-08-13

## 2025-08-26 ENCOUNTER — APPOINTMENT (OUTPATIENT)
Dept: DERMATOLOGY | Facility: CLINIC | Age: 70
End: 2025-08-26

## 2025-09-03 ENCOUNTER — APPOINTMENT (OUTPATIENT)
Dept: RHEUMATOLOGY | Facility: CLINIC | Age: 70
End: 2025-09-03

## (undated) DEVICE — SOL IRR BAG NS 0.9% 1000ML

## (undated) DEVICE — MAKO VIZADISC KNEE TRACKING KIT

## (undated) DEVICE — DRSG ACE BANDAGE 6"

## (undated) DEVICE — DRSG 2X2

## (undated) DEVICE — VENODYNE/SCD SLEEVE CALF MEDIUM

## (undated) DEVICE — STERIS DEFENDO 3-PIECE KIT (AIR/WATER, SUCTION & BIOPSY VALVES)

## (undated) DEVICE — SOL BAG NS 0.9% 1000ML

## (undated) DEVICE — SYR LUER SLIP TIP 50CC

## (undated) DEVICE — ZIMMER MIXING BOWL

## (undated) DEVICE — FORCEP RADIAL JAW 4 W NDL 2.4MM 2.8MM 240CM ORANGE DISP

## (undated) DEVICE — DRAPE 1/2 SHEET 40X57"

## (undated) DEVICE — ZIMMER PULSAVAC PLUS FAN KIT

## (undated) DEVICE — FORCEP ENDOJAW AGTR LC W NDL 2.8MM 230CM DISP

## (undated) DEVICE — TAPE SILK 3"

## (undated) DEVICE — HOOD T7 NON-PEELAWAY

## (undated) DEVICE — SYR IV FLUSH SALINE 10ML 30/TY

## (undated) DEVICE — SLING ARM CHIEFTAIN MESH LARGE

## (undated) DEVICE — SUT VICRYL 2-0 27" CT-2 UNDYED

## (undated) DEVICE — DRSG WEBRIL 6"

## (undated) DEVICE — DRAPE IOBAN 33" X 23"

## (undated) DEVICE — GOWN IMPERV XL

## (undated) DEVICE — SUT VICRYL 0 18" CT-1 UNDYED (POP-OFF)

## (undated) DEVICE — DRSG MEPILEX 10 X 25CM (4 X 10") POST OP AG SILVER

## (undated) DEVICE — DENTURE CUP PINK

## (undated) DEVICE — MAKO DRAPE KIT

## (undated) DEVICE — DRSG DERMABOND PRINEO 60CM

## (undated) DEVICE — WOUND IRR SURGIPHOR

## (undated) DEVICE — MAKO BLADE STANDARD

## (undated) DEVICE — MAKO CHECKPOINT KIT FEMORAL / TIBIAL

## (undated) DEVICE — ELCTR STRYKER NEPTUNE SMOKE EVACUATION PENCIL (GREEN)

## (undated) DEVICE — TUBING ALARIS PUMP MODULE NON-DEHP

## (undated) DEVICE — SENSOR O2 FINGER ADULT

## (undated) DEVICE — TUBING IV EXTENSION MACRO W CLAVE 7"

## (undated) DEVICE — SOL IRR POUR NS 0.9% 1000ML

## (undated) DEVICE — SUT STRATAFIX SPIRAL PDS PLUS 1 35X35CM CTX VIOLET

## (undated) DEVICE — SOL IRR POUR H2O 1000ML

## (undated) DEVICE — DRSG CURITY GAUZE SPONGE 4 X 4" 12-PLY NON-STERILE

## (undated) DEVICE — SOL IRR BAG NS 0.9% 3000ML

## (undated) DEVICE — SOL IRR BAG H2O 1000ML

## (undated) DEVICE — DRAPE STICKY U BLUE 60 X 84"

## (undated) DEVICE — PACK TOTAL KNEE

## (undated) DEVICE — UNDERPAD LINEN SAVER 23 X 36"

## (undated) DEVICE — DRAPE 3/4 SHEET 52X76"

## (undated) DEVICE — SYR SLIP 10CC

## (undated) DEVICE — DRAPE XL SHEET 77X98"

## (undated) DEVICE — ELCTR GROUNDING PAD ADULT COVIDIEN

## (undated) DEVICE — WARMING BLANKET FULL ADULT

## (undated) DEVICE — MAKO BLADE NARROW

## (undated) DEVICE — SYR LUER LOK 50CC

## (undated) DEVICE — PACK IV START WITH CHG

## (undated) DEVICE — WARMING BLANKET UPPER ADULT

## (undated) DEVICE — GLV 8 PROTEXIS (BLUE)

## (undated) DEVICE — CATH IV SAFE BC 22G X 1" (BLUE)

## (undated) DEVICE — GLV 8 PROTEXIS ORTHO (BROWN)

## (undated) DEVICE — ELCTR AQUAMANTYS BIPOLAR SEALER 6.0

## (undated) DEVICE — MASK PROCEDURE EARLOOP LVL 2 50/BX

## (undated) DEVICE — NDL HYPO SAFE 20G X 1.5" (YELLOW)

## (undated) DEVICE — SUT STRATAFIX SPIRAL MONOCRYL PLUS 3-0 30CM PS-2 UNDYED